# Patient Record
Sex: FEMALE | Race: WHITE | NOT HISPANIC OR LATINO | Employment: UNEMPLOYED | URBAN - NONMETROPOLITAN AREA
[De-identification: names, ages, dates, MRNs, and addresses within clinical notes are randomized per-mention and may not be internally consistent; named-entity substitution may affect disease eponyms.]

---

## 2017-01-27 DIAGNOSIS — I10 ESSENTIAL HYPERTENSION: ICD-10-CM

## 2017-01-30 RX ORDER — LISINOPRIL 2.5 MG/1
TABLET ORAL
Qty: 30 TABLET | Refills: 0 | Status: SHIPPED | OUTPATIENT
Start: 2017-01-30 | End: 2017-02-24 | Stop reason: SDUPTHER

## 2017-02-17 ENCOUNTER — OFFICE VISIT (OUTPATIENT)
Dept: FAMILY MEDICINE CLINIC | Facility: CLINIC | Age: 62
End: 2017-02-17

## 2017-02-17 VITALS
BODY MASS INDEX: 28.28 KG/M2 | WEIGHT: 176 LBS | SYSTOLIC BLOOD PRESSURE: 100 MMHG | HEART RATE: 70 BPM | OXYGEN SATURATION: 97 % | HEIGHT: 66 IN | DIASTOLIC BLOOD PRESSURE: 70 MMHG

## 2017-02-17 DIAGNOSIS — I47.1 SUPRAVENTRICULAR TACHYCARDIA (HCC): ICD-10-CM

## 2017-02-17 DIAGNOSIS — E78.5 DYSLIPIDEMIA: ICD-10-CM

## 2017-02-17 DIAGNOSIS — Z11.59 NEED FOR HEPATITIS C SCREENING TEST: ICD-10-CM

## 2017-02-17 DIAGNOSIS — I10 ESSENTIAL HYPERTENSION: ICD-10-CM

## 2017-02-17 DIAGNOSIS — E55.9 VITAMIN D DEFICIENCY: ICD-10-CM

## 2017-02-17 DIAGNOSIS — Z23 NEED FOR TDAP VACCINATION: ICD-10-CM

## 2017-02-17 DIAGNOSIS — E03.9 ACQUIRED HYPOTHYROIDISM: ICD-10-CM

## 2017-02-17 LAB
25(OH)D3 SERPL-MCNC: 29.4 NG/ML
ALBUMIN SERPL-MCNC: 4.2 G/DL (ref 3.2–4.8)
ALBUMIN/GLOB SERPL: 1.4 G/DL (ref 1.5–2.5)
ALP SERPL-CCNC: 165 U/L (ref 25–100)
ALT SERPL W P-5'-P-CCNC: 27 U/L (ref 7–40)
ANION GAP SERPL CALCULATED.3IONS-SCNC: 4 MMOL/L (ref 3–11)
ARTICHOKE IGE QN: 89 MG/DL (ref 0–130)
AST SERPL-CCNC: 24 U/L (ref 0–33)
BILIRUB SERPL-MCNC: 0.4 MG/DL (ref 0.3–1.2)
BUN BLD-MCNC: 19 MG/DL (ref 9–23)
BUN/CREAT SERPL: 23.8 (ref 7–25)
CALCIUM SPEC-SCNC: 9.5 MG/DL (ref 8.7–10.4)
CHLORIDE SERPL-SCNC: 103 MMOL/L (ref 99–109)
CHOLEST SERPL-MCNC: 169 MG/DL (ref 0–200)
CO2 SERPL-SCNC: 33 MMOL/L (ref 20–31)
CREAT BLD-MCNC: 0.8 MG/DL (ref 0.6–1.3)
DEPRECATED RDW RBC AUTO: 44.5 FL (ref 37–54)
ERYTHROCYTE [DISTWIDTH] IN BLOOD BY AUTOMATED COUNT: 13.1 % (ref 11.3–14.5)
GFR SERPL CREATININE-BSD FRML MDRD: 73 ML/MIN/1.73
GLOBULIN UR ELPH-MCNC: 2.9 GM/DL
GLUCOSE BLD-MCNC: 72 MG/DL (ref 70–100)
HCT VFR BLD AUTO: 46.5 % (ref 34.5–44)
HCV AB SER DONR QL: NORMAL
HDLC SERPL-MCNC: 38 MG/DL (ref 40–60)
HGB BLD-MCNC: 15.9 G/DL (ref 11.5–15.5)
MCH RBC QN AUTO: 32.3 PG (ref 27–31)
MCHC RBC AUTO-ENTMCNC: 34.2 G/DL (ref 32–36)
MCV RBC AUTO: 94.5 FL (ref 80–99)
PLATELET # BLD AUTO: 259 10*3/MM3 (ref 150–450)
PMV BLD AUTO: 9.3 FL (ref 6–12)
POTASSIUM BLD-SCNC: 3.8 MMOL/L (ref 3.5–5.5)
PROT SERPL-MCNC: 7.1 G/DL (ref 5.7–8.2)
RBC # BLD AUTO: 4.92 10*6/MM3 (ref 3.89–5.14)
SODIUM BLD-SCNC: 140 MMOL/L (ref 132–146)
T4 FREE SERPL-MCNC: 1.53 NG/DL (ref 0.89–1.76)
TRIGL SERPL-MCNC: 328 MG/DL (ref 0–150)
TSH SERPL DL<=0.05 MIU/L-ACNC: 1.19 MIU/ML (ref 0.35–5.35)
WBC NRBC COR # BLD: 10.75 10*3/MM3 (ref 3.5–10.8)

## 2017-02-17 PROCEDURE — 84443 ASSAY THYROID STIM HORMONE: CPT | Performed by: NURSE PRACTITIONER

## 2017-02-17 PROCEDURE — 84439 ASSAY OF FREE THYROXINE: CPT | Performed by: NURSE PRACTITIONER

## 2017-02-17 PROCEDURE — 80061 LIPID PANEL: CPT | Performed by: NURSE PRACTITIONER

## 2017-02-17 PROCEDURE — 90471 IMMUNIZATION ADMIN: CPT | Performed by: NURSE PRACTITIONER

## 2017-02-17 PROCEDURE — 82306 VITAMIN D 25 HYDROXY: CPT | Performed by: NURSE PRACTITIONER

## 2017-02-17 PROCEDURE — 85027 COMPLETE CBC AUTOMATED: CPT | Performed by: NURSE PRACTITIONER

## 2017-02-17 PROCEDURE — 80053 COMPREHEN METABOLIC PANEL: CPT | Performed by: NURSE PRACTITIONER

## 2017-02-17 PROCEDURE — 86803 HEPATITIS C AB TEST: CPT | Performed by: NURSE PRACTITIONER

## 2017-02-17 PROCEDURE — 90715 TDAP VACCINE 7 YRS/> IM: CPT | Performed by: NURSE PRACTITIONER

## 2017-02-17 PROCEDURE — 99214 OFFICE O/P EST MOD 30 MIN: CPT | Performed by: NURSE PRACTITIONER

## 2017-02-17 PROCEDURE — 36415 COLL VENOUS BLD VENIPUNCTURE: CPT | Performed by: NURSE PRACTITIONER

## 2017-02-17 PROCEDURE — 84481 FREE ASSAY (FT-3): CPT | Performed by: NURSE PRACTITIONER

## 2017-02-17 RX ORDER — FLECAINIDE ACETATE 150 MG/1
75 TABLET ORAL 2 TIMES DAILY
COMMUNITY
End: 2017-09-06 | Stop reason: SDUPTHER

## 2017-02-17 RX ORDER — CLINDAMYCIN HYDROCHLORIDE 300 MG/1
CAPSULE ORAL
Refills: 0 | COMMUNITY
Start: 2017-02-10 | End: 2017-06-15

## 2017-02-17 NOTE — PROGRESS NOTES
"Akash Ch is a 61 y.o. female.     HPI Comments: Patient is a 61 year old female here today, to establish care with a PCP. She recently moved here from [facility].  She has no acute complaints.  She states she needs management of her hypothyroidism. She states she had hyperthyroidism 10 years ago and had radioactive treatment, and now she is hypothyroid.  She currently takes 137 mcg of Synthroid daily and states it works well for her.  She states she also follows Dr. Cage and Doug, her Cardiologists, for Supraventricular tachycardia. She states she had an ablation in October 2014. She states she follows with them every 6 months and currently has no issues with her SVT.      She states she is up to date on her T-Dap and all other vaccines.  Her mammogram and pap smear are also up to date.         The following portions of the patient's history were reviewed and updated as appropriate: allergies, current medications, past family history, past medical history, past social history, past surgical history and problem list.    Review of Systems   Constitutional: Negative.  Negative for fatigue and unexpected weight change.   HENT: Negative.    Eyes: Negative.    Respiratory: Negative for apnea, cough, chest tightness, shortness of breath and wheezing.    Gastrointestinal: Negative.    Endocrine: Negative.    Genitourinary: Negative.    Musculoskeletal: Negative.    Skin: Negative.    Allergic/Immunologic: Negative.    Neurological: Negative.    Hematological: Negative.    Psychiatric/Behavioral: Negative.      Blood pressure 100/70, pulse 70, height 66\" (167.6 cm), weight 176 lb (79.8 kg), SpO2 97 %.  Objective   Physical Exam   Constitutional: She is oriented to person, place, and time. Vital signs are normal. She appears well-developed and well-nourished. No distress.   HENT:   Head: Normocephalic.   Right Ear: External ear normal.   Left Ear: External ear normal.   Nose: Nose normal. "   Mouth/Throat: Oropharynx is clear and moist. No oropharyngeal exudate.   Eyes: Conjunctivae and lids are normal.   Neck: Normal range of motion. Neck supple. No tracheal deviation present. No thyromegaly present.   Cardiovascular: Normal rate, regular rhythm, S1 normal, S2 normal, normal heart sounds and intact distal pulses.    No murmur heard.  Pulmonary/Chest: Effort normal and breath sounds normal. No respiratory distress. She has no wheezes. She has no rales. She exhibits no tenderness.   Abdominal: Soft. Bowel sounds are normal. She exhibits no distension and no mass. There is no hepatosplenomegaly or splenomegaly. There is no tenderness. There is no rebound and no guarding. No hernia.   Lymphadenopathy:     She has no cervical adenopathy.        Right cervical: No superficial cervical, no deep cervical and no posterior cervical adenopathy present.       Left cervical: No superficial cervical, no deep cervical and no posterior cervical adenopathy present.   Neurological: She is alert and oriented to person, place, and time. Coordination and gait normal.   Skin: Skin is warm, dry and intact. No rash noted.   Psychiatric: She has a normal mood and affect. Her behavior is normal. Judgment and thought content normal.   Nursing note and vitals reviewed.      Assessment/Plan   Justina was seen today for establish care.    Diagnoses and all orders for this visit:    Acquired hypothyroidism  -     TSH  -     T4, Free  -     T3, Free    Supraventricular tachycardia  -     CBC (No Diff)  -     Comprehensive Metabolic Panel    Dyslipidemia  -     CBC (No Diff)  -     Comprehensive Metabolic Panel  -     Lipid Panel    Vitamin D deficiency  -     Vitamin D 25 Hydroxy    Need for hepatitis C screening test  -     Hepatitis C Antibody      New Medications Ordered This Visit   Medications   • clindamycin (CLEOCIN) 300 MG capsule     Sig: TK 1 C PO TID     Refill:  0   • flecainide (TAMBOCOR) 150 MG tablet     Sig: Take 150  mg by mouth 2 (Two) Times a Day. 1/2 tab bid     Routine screening labs drawn today in office to further assess patient's overall health as patient has history of dyslipidemia, Vitamin D deficiency, and SVT.  I will contact patient regarding test results and provide instructions regarding any necessary changes in plan of care.    TSH, T3, and T4 labs drawn today to evaluate her hypothyroidism.  I will contact patient regarding test results and provide instructions regarding any necessary changes in plan of care.  She will continue her current dose of Synthroid for now.      Hepatitis C screening completed as recommended due to patient's age.      Patient was encouraged to keep me informed of any acute changes, lack of improvement, or any new concerning symptoms.  Patient voiced understanding of all instructions and denied further questions.    Patient will return for follow up in 6 months, sooner if needed.  She will also keep her scheduled follow up appointments with cardiology.

## 2017-02-19 LAB — T3FREE SERPL-MCNC: 2 PG/ML (ref 2–4.4)

## 2017-02-22 ENCOUNTER — TELEPHONE (OUTPATIENT)
Dept: FAMILY MEDICINE CLINIC | Facility: CLINIC | Age: 62
End: 2017-02-22

## 2017-02-22 NOTE — TELEPHONE ENCOUNTER
----- Message from Claudette Carpenter MA sent at 2/22/2017 12:43 PM EST -----   Pt notified of results    ----- Message -----     From: JOSE Beard     Sent: 2/20/2017  10:49 AM       To: Claudette Carpenter MA    Let her know that her labs all look good except her cholesterol is a little abnormal. Her good cholesterol should be greater than 40 and hers is 38 and her triglycerides are 328 and should be less than 150. The triglycerides are elevated mostly because she was not fasting, but tell her to cut back on sweets and carbs and this will help as well. We will check her cholesterol next time, with her fasting. Also, she needs to try to get 30 minutes of exercise in, 5 days a week, to help her good cholesterol, as well as triglycerides. Fish oil OTC will help both of these as well.

## 2017-02-24 DIAGNOSIS — I10 ESSENTIAL HYPERTENSION: ICD-10-CM

## 2017-02-27 RX ORDER — LISINOPRIL 2.5 MG/1
TABLET ORAL
Qty: 30 TABLET | Refills: 5 | Status: SHIPPED | OUTPATIENT
Start: 2017-02-27 | End: 2017-07-24 | Stop reason: SDUPTHER

## 2017-06-15 ENCOUNTER — OFFICE VISIT (OUTPATIENT)
Dept: CARDIOLOGY | Facility: CLINIC | Age: 62
End: 2017-06-15

## 2017-06-15 DIAGNOSIS — R07.89 OTHER CHEST PAIN: ICD-10-CM

## 2017-06-15 DIAGNOSIS — I47.1 SUPRAVENTRICULAR TACHYCARDIA (HCC): Primary | ICD-10-CM

## 2017-06-15 DIAGNOSIS — I10 ESSENTIAL HYPERTENSION: ICD-10-CM

## 2017-06-15 DIAGNOSIS — R06.02 SOB (SHORTNESS OF BREATH) ON EXERTION: ICD-10-CM

## 2017-06-15 DIAGNOSIS — E78.5 DYSLIPIDEMIA: ICD-10-CM

## 2017-06-15 PROCEDURE — 93000 ELECTROCARDIOGRAM COMPLETE: CPT | Performed by: NURSE PRACTITIONER

## 2017-06-15 PROCEDURE — 99214 OFFICE O/P EST MOD 30 MIN: CPT | Performed by: NURSE PRACTITIONER

## 2017-06-15 RX ORDER — NITROGLYCERIN 0.4 MG/1
TABLET SUBLINGUAL
Qty: 30 TABLET | Refills: 3 | Status: SHIPPED | OUTPATIENT
Start: 2017-06-15 | End: 2020-09-11 | Stop reason: SDUPTHER

## 2017-06-15 NOTE — PROGRESS NOTES
Subjective   Justina Ch is a 62 y.o. female     Chief Complaint   Patient presents with   • Follow-up     patient appears in office today for follow up        HPI    Problem list:    1. SVT - flecainide therapy  1.2 Event Monitor 5/7-5/20-16 - NSR with PACs and PVCs  2. Bradycardia  3. Left atrial tachycardia and right atrial isthmus-dependent flutter   3.1 RFA 10/14/2014  4. Grave’s disease, status post ablation with hypothyroidism.   5. Dyslipidemia.   6. Hypertension  6.1 Stress test 7/23/14 - low risk, no ischemia   6.2 Echo 7/21/14 - EF 55-60%; DD II; trace MR, TR  7. Cardiomegaly  7.1 CT Chest 8/5/15 - Cardiomegaly; suspected hepatosplenomegaly with fatty liver    Patient is a 62-year-old female who presents today for a follow-up.  She has been having left anterior chest tightness that has come and go for a week now.  It can occur at any time.  She will get short of breath when it happens and can last up to 5 min.  She says she only has occasional fluttering, it is much better.  She denies any dizziness, presyncope, syncope, orthopnea, PND or edema.  She will get short of breath if she walks a lot, but otherwise she is ok.  She just got back from [facility] as her daughter there is Preg and due in Aug.      Current Outpatient Prescriptions   Medication Sig Dispense Refill   • aspirin 81 MG tablet Take 1 tablet by mouth daily.     • cetirizine (ZyrTEC) 10 MG tablet Take 1 tablet by mouth daily as needed.     • flecainide (TAMBOCOR) 150 MG tablet Take 150 mg by mouth 2 (Two) Times a Day. 1/2 tab bid     • levothyroxine (SYNTHROID, LEVOTHROID) 137 MCG tablet Take 1 tablet by mouth daily.     • lisinopril (PRINIVIL,ZESTRIL) 2.5 MG tablet TAKE 1 TABLET BY MOUTH ONCE DAILY 30 tablet 5   • nitroglycerin (NITROSTAT) 0.4 MG SL tablet 1 under the tongue as needed for angina, may repeat q5mins for up three doses 30 tablet 3     No current facility-administered medications for this visit.        ALLERGIES    Review of  patient's allergies indicates no known allergies.    Past Medical History:   Diagnosis Date   • Arrhythmia    • Enlarged heart    • Graves' disease    • Hx of mammogram 09/2016 9/16   • Hyperlipidemia    • Hypertension 7/27/2016       Social History     Social History   • Marital status:      Spouse name: N/A   • Number of children: N/A   • Years of education: N/A     Occupational History   • Not on file.     Social History Main Topics   • Smoking status: Former Smoker     Quit date: 5/4/2001   • Smokeless tobacco: Not on file   • Alcohol use No   • Drug use: No   • Sexual activity: Defer     Other Topics Concern   • Not on file     Social History Narrative       Family History   Problem Relation Age of Onset   • Hypertension Mother    • Breast cancer Mother    • Bone cancer Mother    • Heart attack Father    • Heart disease Father      pacemaker   • Hyperlipidemia Father    • Hypertension Father    • Breast cancer Sister    • Ovarian cancer Sister    • Diabetes Paternal Grandmother        Review of Systems   Constitutional: Negative for diaphoresis and fatigue.   HENT: Positive for sore throat (due to allergies/drainage). Negative for sinus pressure and sneezing.    Eyes: Positive for visual disturbance (wears glasses daily).   Respiratory: Positive for shortness of breath (on exertion; mainly walking a lot and with CP, more than 5 min). Negative for cough, chest tightness and wheezing.    Cardiovascular: Positive for chest pain (left anterior chest tightness that can come at anytime for a few weeks now) and palpitations (occasional flutters). Negative for leg swelling.   Gastrointestinal: Negative for abdominal pain, nausea and vomiting.   Endocrine: Negative for cold intolerance, heat intolerance and polyuria.   Genitourinary: Negative for difficulty urinating, frequency and urgency.   Musculoskeletal: Negative for arthralgias, back pain and neck pain.   Skin: Negative.  Negative for rash and wound.    Allergic/Immunologic: Positive for environmental allergies (seasonal allergies). Negative for food allergies.   Neurological: Negative for dizziness, weakness, light-headedness and headaches.   Hematological: Bruises/bleeds easily (bruises and bleeds easily).   Psychiatric/Behavioral: Negative for agitation, confusion and sleep disturbance. The patient is not nervous/anxious.        Objective   There were no vitals taken for this visit.  Lab Results (most recent)     None        Physical Exam   Constitutional: She is oriented to person, place, and time. Vital signs are normal. She appears well-developed and well-nourished. She is active and cooperative.   HENT:   Head: Normocephalic.   Eyes: Lids are normal.   Wears glasses    Neck: Normal carotid pulses, no hepatojugular reflux and no JVD present. Carotid bruit is not present.   Cardiovascular: Regular rhythm and normal heart sounds.  Bradycardia present.    Pulses:       Radial pulses are 2+ on the right side, and 2+ on the left side.        Dorsalis pedis pulses are 2+ on the right side, and 2+ on the left side.        Posterior tibial pulses are 2+ on the right side, and 2+ on the left side.   No edema BLE.    Pulmonary/Chest: Effort normal and breath sounds normal.   Abdominal: Normal appearance and bowel sounds are normal.   Neurological: She is alert and oriented to person, place, and time.   Skin: Skin is warm, dry and intact.   Psychiatric: She has a normal mood and affect. Her speech is normal and behavior is normal. Judgment and thought content normal. Cognition and memory are normal.       Procedure     ECG 12 Lead  Date/Time: 6/15/2017 1:38 PM  Performed by: ИВАН KEARNS  Authorized by: ИВАН KEARNS   Comparison: compared with previous ECG from 9/9/2016  Similar to previous ECG  Rhythm: sinus bradycardia  Rate: bradycardic  BPM: 58  QRS axis: normal  Clinical impression: non-specific ECG and low voltage  Comments: HTN  SOB                  Assessment/Plan      Diagnosis Plan   1. Supraventricular tachycardia  Stress Test With Myocardial Perfusion One Day    Adult Transthoracic Echo Complete    Stress Test With Myocardial Perfusion One Day    Adult Transthoracic Echo Complete   2. Essential hypertension  ECG 12 Lead    Stress Test With Myocardial Perfusion One Day    Adult Transthoracic Echo Complete    Stress Test With Myocardial Perfusion One Day    Adult Transthoracic Echo Complete   3. SOB (shortness of breath) on exertion  ECG 12 Lead    Stress Test With Myocardial Perfusion One Day    Adult Transthoracic Echo Complete    Stress Test With Myocardial Perfusion One Day    Adult Transthoracic Echo Complete   4. Dyslipidemia  Stress Test With Myocardial Perfusion One Day    Adult Transthoracic Echo Complete    Stress Test With Myocardial Perfusion One Day    Adult Transthoracic Echo Complete   5. Other chest pain  Stress Test With Myocardial Perfusion One Day    Adult Transthoracic Echo Complete    nitroglycerin (NITROSTAT) 0.4 MG SL tablet    Stress Test With Myocardial Perfusion One Day    Adult Transthoracic Echo Complete       Return for After testing.       CP/Shortness of breath - patient will have an ischemia work-up, stress and echo.  She will use Nitro PRN for chest pain, no resolution she will go to the ER.  Hypertension - doing well.  Dyslipidemia - LDL is good, however, trig are elevated.  Encouraged on low carb/sweet diet.  SVT - doing well on flecainide.  Patient will continue her medication regimen.  She will follow-up after testing or sooner if any changes.  QT/QTc 427/424.

## 2017-06-15 NOTE — PATIENT INSTRUCTIONS
Paroxysmal Supraventricular Tachycardia  Paroxysmal supraventricular tachycardia (PSVT) is a type of abnormal heart rhythm. It causes your heart to beat very quickly and then suddenly stop beating so quickly. A normal heart rate is  beats per minute. During an episode of PSVT, your heart rate may be 150-250 beats per minute. This can make you feel light-headed and short of breath. An episode of PSVT can be frightening. It is usually not dangerous.  The heart has four chambers. All chambers need to work together for the heart to beat effectively. A normal heartbeat usually starts in the right upper chamber of the heart (atrium) when an area (sinoatrial node) puts out an electrical signal that spreads to the other chambers. People with PSVT may have abnormal electrical pathways, or they may have other areas in the upper chambers that send out electrical signals. The result is a very rapid heartbeat.  When your heart beats very quickly, it does not have time to fill completely with blood. When PSVT happens often or it lasts for long periods, it can lead to heart weakness and failure. Most people with PSVT do not have any other heart disease.  CAUSES  Abnormal electrical activity in the heart causes PSVT. It is not known why some people get PSVT and others do not.  RISK FACTORS  You may be more likely to have PSVT if:  · You are 20-30 years old.  · You are a woman.  Other factors that may increase your chances of an attack include:  · Stress.  · Being tired.  · Smoking.  · Stimulant drugs.  · Alcoholic drinks.  · Caffeine.  · Pregnancy.  SIGNS AND SYMPTOMS  A mild episode of PSVT may cause no symptoms. If you do have signs and symptoms, they may include:  · A pounding heart.  · Feeling of skipped heartbeats (palpitations).  · Weakness.  · Shortness of breath.  · Tightness or pain in your chest.  · Light-headedness.  · Anxiety.  · Dizziness.  · Sweating.  · Nausea.  · A fainting spell.  DIAGNOSIS  Your health care  provider may suspect PSVT if you have symptoms that come and go. The health care provider will do a physical exam. If you are having an episode during the exam, the health care provider may be able to diagnose PSVT by listening to your heart and feeling your pulse. Tests may also be done, including:  · An electrical study of your heart (electrocardiogram, or ECG).  · A test in which you wear a portable ECG monitor all day (Holter monitor) or for several days (event monitor).  · A test that involves taking an image of your heart using sound waves (echocardiogram) to rule out other causes of a fast heart rate.  TREATMENT  You may not need treatment if episodes of PSVT do not happen often or if they do not cause symptoms. If PSVT episodes do cause symptoms, your health care provider may first suggest trying a self-treatment called vagus nerve stimulation. The vagus nerve extends down from the brain. It regulates certain body functions. Stimulating this nerve can slow down the heart. Your health care provider can teach you ways to do this. You may need to try a few ways to find what works best for you. Options include:  · Holding your breath and pushing, as though you are having a bowel movement.  · Massaging an area on one side of your neck below your jaw.  · Bending forward with your head between your legs.  · Bending forward with your head between your legs and coughing.  · Massaging your eyeballs with your eyes closed.  If vagus nerve stimulation does not work, other treatment options include:  · Medicines to prevent an attack.  · Being treated in the hospital with medicine or electric shock to stop an attack (cardioversion). This treatment can include:    Getting medicine through an IV line.    Having a small electric shock delivered to your heart. You will be given medicine to make you sleep through this procedure.  · If you have frequent episodes with symptoms, you may need a procedure to get rid of the faulty  areas of your heart (radiofrequency ablation) and end the episodes of PSVT. In this procedure:    A long, thin tube (catheter) is passed through one of your veins into your heart.    Energy directed through the catheter eliminates the areas of your heart that are causing abnormal electric stimulation.  HOME CARE INSTRUCTIONS  · Take medicines only as directed by your health care provider.  · Do not use caffeine in any form if caffeine triggers episodes of PSVT. Otherwise, consume caffeine in moderation. This means no more than a few cups of coffee or the equivalent each day.  · Do not drink alcohol if alcohol triggers episodes of PSVT. Otherwise, limit alcohol intake to no more than 1 drink per day for nonpregnant women and 2 drinks per day for men. One drink equals 12 ounces of beer, 5 ounces of wine, or 1½ ounces of hard liquor.  · Do not use any tobacco products, including cigarettes, chewing tobacco, or electronic cigarettes. If you need help quitting, ask your health care provider.  · Try to get at least 7 hours of sleep each night.  · Find healthy ways to manage stress.  · Perform vagus nerve stimulation as directed by your health care provider.  · Maintain a healthy weight.  · Get some exercise on most days. Ask your health care provider to suggest some good activities for you.  SEEK MEDICAL CARE IF:  · You are having episodes of PSVT more often, or they are lasting longer.  · Vagus nerve stimulation is no longer helping.  · You have new symptoms during an episode.  SEEK IMMEDIATE MEDICAL CARE IF:  · You have chest pain or trouble breathing.  · You have an episode of PSVT that has lasted longer than 20 minutes.  · You have passed out from an episode of PSVT.  These symptoms may represent a serious problem that is an emergency. Do not wait to see if the symptoms will go away. Get medical help right away. Call your local emergency services (911 in the [place] Do not drive yourself to the hospital.     This  information is not intended to replace advice given to you by your health care provider. Make sure you discuss any questions you have with your health care provider.     Document Released: 12/18/2006 Document Revised: 01/08/2016 Document Reviewed: 05/28/2015  [employer] Patient Education ©2017 [employer]    Nonspecific Chest Pain   Chest pain can be caused by many different conditions. There is always a chance that your pain could be related to something serious, such as a heart attack or a blood clot in your lungs. Chest pain can also be caused by conditions that are not life-threatening. If you have chest pain, it is very important to follow up with your health care provider.  CAUSES   Chest pain can be caused by:  · Heartburn.  · Pneumonia or bronchitis.  · Anxiety or stress.  · Inflammation around your heart (pericarditis) or lung (pleuritis or pleurisy).  · A blood clot in your lung.  · A collapsed lung (pneumothorax). It can develop suddenly on its own (spontaneous pneumothorax) or from trauma to the chest.  · Shingles infection (varicella-zoster virus).  · Heart attack.  · Damage to the bones, muscles, and cartilage that make up your chest wall. This can include:    Bruised bones due to injury.    Strained muscles or cartilage due to frequent or repeated coughing or overwork.    Fracture to one or more ribs.    Sore cartilage due to inflammation (costochondritis).  RISK FACTORS   Risk factors for chest pain may include:  · Activities that increase your risk for trauma or injury to your chest.  · Respiratory infections or conditions that cause frequent coughing.  · Medical conditions or overeating that can cause heartburn.  · Heart disease or family history of heart disease.  · Conditions or health behaviors that increase your risk of developing a blood clot.  · Having had chicken pox (varicella zoster).  SIGNS AND SYMPTOMS  Chest pain can feel like:  · Burning or tingling on the surface of your  chest or deep in your chest.  · Crushing, pressure, aching, or squeezing pain.  · Dull or sharp pain that is worse when you move, cough, or take a deep breath.  · Pain that is also felt in your back, neck, shoulder, or arm, or pain that spreads to any of these areas.  Your chest pain may come and go, or it may stay constant.  DIAGNOSIS  Lab tests or other studies may be needed to find the cause of your pain. Your health care provider may have you take a test called an ambulatory ECG (electrocardiogram). An ECG records your heartbeat patterns at the time the test is performed. You may also have other tests, such as:  · Transthoracic echocardiogram (TTE). During echocardiography, sound waves are used to create a picture of all of the heart structures and to look at how blood flows through your heart.  · Transesophageal echocardiogram (CRISTIANE). This is a more advanced imaging test that obtains images from inside your body. It allows your health care provider to see your heart in finer detail.  · Cardiac monitoring. This allows your health care provider to monitor your heart rate and rhythm in real time.  · Holter monitor. This is a portable device that records your heartbeat and can help to diagnose abnormal heartbeats. It allows your health care provider to track your heart activity for several days, if needed.  · Stress tests. These can be done through exercise or by taking medicine that makes your heart beat more quickly.  · Blood tests.  · Imaging tests.  TREATMENT   Your treatment depends on what is causing your chest pain. Treatment may include:  · Medicines. These may include:    Acid blockers for heartburn.    Anti-inflammatory medicine.    Pain medicine for inflammatory conditions.    Antibiotic medicine, if an infection is present.    Medicines to dissolve blood clots.    Medicines to treat coronary artery disease.  · Supportive care for conditions that do not require medicines. This may include:    Resting.     Applying heat or cold packs to injured areas.    Limiting activities until pain decreases.  HOME CARE INSTRUCTIONS  · If you were prescribed an antibiotic medicine, finish it all even if you start to feel better.  · Avoid any activities that bring on chest pain.  · Do not use any tobacco products, including cigarettes, chewing tobacco, or electronic cigarettes. If you need help quitting, ask your health care provider.  · Do not drink alcohol.  · Take medicines only as directed by your health care provider.  · Keep all follow-up visits as directed by your health care provider. This is important. This includes any further testing if your chest pain does not go away.  · If heartburn is the cause for your chest pain, you may be told to keep your head raised (elevated) while sleeping. This reduces the chance that acid will go from your stomach into your esophagus.  · Make lifestyle changes as directed by your health care provider. These may include:    Getting regular exercise. Ask your health care provider to suggest some activities that are safe for you.    Eating a heart-healthy diet. A registered dietitian can help you to learn healthy eating options.    Maintaining a healthy weight.    Managing diabetes, if necessary.    Reducing stress.  SEEK MEDICAL CARE IF:  · Your chest pain does not go away after treatment.  · You have a rash with blisters on your chest.  · You have a fever.  SEEK IMMEDIATE MEDICAL CARE IF:   · Your chest pain is worse.  · You have an increasing cough, or you cough up blood.  · You have severe abdominal pain.  · You have severe weakness.  · You faint.  · You have chills.  · You have sudden, unexplained chest discomfort.  · You have sudden, unexplained discomfort in your arms, back, neck, or jaw.  · You have shortness of breath at any time.  · You suddenly start to sweat, or your skin gets clammy.  · You feel nauseous or you vomit.  · You suddenly feel light-headed or dizzy.  · Your heart  begins to beat quickly, or it feels like it is skipping beats.  These symptoms may represent a serious problem that is an emergency. Do not wait to see if the symptoms will go away. Get medical help right away. Call your local emergency services (911 in the [place] Do not drive yourself to the hospital.     This information is not intended to replace advice given to you by your health care provider. Make sure you discuss any questions you have with your health care provider.     Document Released: 09/27/2006 Document Revised: 01/08/2016 Document Reviewed: 07/24/2015  [employer] Interactive Patient Education ©2017 [employer]

## 2017-06-23 ENCOUNTER — OFFICE VISIT (OUTPATIENT)
Dept: CARDIOLOGY | Facility: CLINIC | Age: 62
End: 2017-06-23

## 2017-06-23 VITALS
HEART RATE: 60 BPM | HEIGHT: 66 IN | BODY MASS INDEX: 28.12 KG/M2 | SYSTOLIC BLOOD PRESSURE: 108 MMHG | DIASTOLIC BLOOD PRESSURE: 68 MMHG | WEIGHT: 175 LBS

## 2017-06-23 DIAGNOSIS — I49.1 PAC (PREMATURE ATRIAL CONTRACTION): ICD-10-CM

## 2017-06-23 DIAGNOSIS — I47.1 ATRIAL TACHYCARDIA (HCC): Primary | ICD-10-CM

## 2017-06-23 DIAGNOSIS — R06.02 SHORTNESS OF BREATH: ICD-10-CM

## 2017-06-23 PROCEDURE — 99213 OFFICE O/P EST LOW 20 MIN: CPT | Performed by: PHYSICIAN ASSISTANT

## 2017-06-23 PROCEDURE — 93000 ELECTROCARDIOGRAM COMPLETE: CPT | Performed by: PHYSICIAN ASSISTANT

## 2017-06-23 NOTE — PROGRESS NOTES
Justina Jing  1955  [phone]      06/23/2017    [facility] CARDIOLOGY     Emma Mccann, APRN  [address]  [place] [address]    Chief Complaint   Patient presents with   • Palpitations       Problem List:   1. Supraventricular tachycardia:   a. History of tachypalpitations and emergency room visits dating back to at least 2-3 years ago.   b. Emergency room visit in July with subsequent hospitalization for supraventricular tachycardia at [facility] for initiation of flecainide.   c. Event recorder, July 2014, demonstrating runs of narrow QRS tachycardia, as fast as 190-200 beats per minute.   d. Echocardiogram, 07/21/2014: Normal left ventricular size and function with ejection fraction of 55%. No significant valvular insufficiency.   e. [facility], database normal, per patient, database incomplete.  f. Status post EP study and RFA of left atrial tachycardia and right atrial isthmus-dependent flutter on 10/04/2014.  g. Recurrent tachypalpitations with event monitor, 01/23/2015 thru 02/21/2015, showing sinus rhythm with occasional PAC and sinus arrhythmia and no SVT or atrial fibrillation.   h. Initiation of Tambocor therapy for suppression of PACs, 02/27/2015.   2. Grave’s disease, status post ablation with hypothyroidism.   3. Dyslipidemia.   4. Hypertension.   5. Skin cancer.   6. Surgical history:  a. D and C.   b. Skin excision for skin cancer.    Allergies  No Known Allergies    Current Medications    Current Outpatient Prescriptions:   •  aspirin 81 MG tablet, Take 1 tablet by mouth daily., Disp: , Rfl:   •  cetirizine (ZyrTEC) 10 MG tablet, Take 1 tablet by mouth daily as needed., Disp: , Rfl:   •  flecainide (TAMBOCOR) 150 MG tablet, Take 75 mg by mouth 2 (Two) Times a Day., Disp: , Rfl:   •  levothyroxine (SYNTHROID, LEVOTHROID) 137 MCG tablet, Take 1 tablet by mouth daily., Disp: , Rfl:   •  lisinopril (PRINIVIL,ZESTRIL)  "2.5 MG tablet, TAKE 1 TABLET BY MOUTH ONCE DAILY, Disp: 30 tablet, Rfl: 5  •  nitroglycerin (NITROSTAT) 0.4 MG SL tablet, 1 under the tongue as needed for angina, may repeat q5mins for up three doses, Disp: 30 tablet, Rfl: 3    History of Present Illness   HPI    Pt presents for follow up of PACs. Since we last saw the pt, pt denies any PAC episodes on her current dose of flecainide,, LH, and dizziness. Denies any hospitalizations, ER visits, bleeding, or TIA/CVA symptoms. Overall feels well except for some KIRKPATRICK and tightness in her chest. Therefore, she is having a stress test and echo with Dr. Camacho next month. BP has been well controlled.     ROS:  General:  Denies fatigue, weight gain or loss  Cardiovascular:  Denies CP, PND, syncope, near syncope, edema or palpitations.  Pulmonary:  +KIRKPATRICK,-  cough, or wheezing      Vitals:    06/23/17 1332   BP: 108/68   BP Location: Right arm   Patient Position: Sitting   Pulse: 60   Weight: 175 lb (79.4 kg)   Height: 66\" (167.6 cm)     PE:  General: NAD  Neck: no JVD, no carotid bruits, no TM  Heart RRR, NL S1, S2, S4 present, no rubs, murmurs  Lungs: CTA, no wheezes, rhonchi, or rales  Abd: soft, non-tender, NL BS  Ext: No musculoskeletal deformities, no edema, cyanosis, or clubbing  Psych: normal mood and affect    Diagnostic Data:    ECG 12 Lead  Date/Time: 6/23/2017 2:13 PM  Performed by: SANJANA WILLSON  Authorized by: SANJANA WILLSON   Comparison: compared with previous ECG   Similar to previous ECG  Rhythm: sinus rhythm  BPM: 60              1. Left atrial tachycardia and right atrial isthmus-dependent flutter     2. PAC (premature atrial contraction)    3. Shortness of breath          Plan:  1) PACs- well controlled on Flecainide  Continue present medications.   2) SOB- will have stress test and echo next month with Dr. Camacho. Results to be faxed to us.   3) HTN- controlled  Wt loss, exercise, salt reduction    F/up in 6-8 months    Sanjana Roberson " [facility]  6/23/2017   2:14 PM  I saw this patient independently.

## 2017-07-12 ENCOUNTER — HOSPITAL ENCOUNTER (OUTPATIENT)
Dept: CARDIOLOGY | Facility: HOSPITAL | Age: 62
Discharge: HOME | End: 2017-07-12

## 2017-07-12 ENCOUNTER — OUTSIDE FACILITY SERVICE (OUTPATIENT)
Dept: CARDIOLOGY | Facility: CLINIC | Age: 62
End: 2017-07-12

## 2017-07-12 LAB
MAXIMAL PREDICTED HEART RATE: 160 BPM
STRESS TARGET HR: 136 BPM

## 2017-07-12 PROCEDURE — 93306 TTE W/DOPPLER COMPLETE: CPT

## 2017-07-12 PROCEDURE — 78452 HT MUSCLE IMAGE SPECT MULT: CPT

## 2017-07-12 PROCEDURE — 93306 TTE W/DOPPLER COMPLETE: CPT | Performed by: INTERNAL MEDICINE

## 2017-07-12 PROCEDURE — 78452 HT MUSCLE IMAGE SPECT MULT: CPT | Performed by: INTERNAL MEDICINE

## 2017-07-12 PROCEDURE — 93018 CV STRESS TEST I&R ONLY: CPT | Performed by: INTERNAL MEDICINE

## 2017-07-12 PROCEDURE — 25010000002 REGADENOSON 0.4 MG/5ML SOLUTION: Performed by: INTERNAL MEDICINE

## 2017-07-12 PROCEDURE — 0 TECHNETIUM SESTAMIBI: Performed by: INTERNAL MEDICINE

## 2017-07-12 PROCEDURE — 93017 CV STRESS TEST TRACING ONLY: CPT

## 2017-07-12 PROCEDURE — A9500 TC99M SESTAMIBI: HCPCS | Performed by: INTERNAL MEDICINE

## 2017-07-12 RX ADMIN — Medication 1 DOSE: at 16:15

## 2017-07-12 RX ADMIN — REGADENOSON 0.4 MG: 0.08 INJECTION, SOLUTION INTRAVENOUS at 16:15

## 2017-07-13 ENCOUNTER — DOCUMENTATION (OUTPATIENT)
Dept: CARDIOLOGY | Facility: CLINIC | Age: 62
End: 2017-07-13

## 2017-07-17 ENCOUNTER — OFFICE VISIT (OUTPATIENT)
Dept: FAMILY MEDICINE CLINIC | Facility: CLINIC | Age: 62
End: 2017-07-17

## 2017-07-17 VITALS
SYSTOLIC BLOOD PRESSURE: 100 MMHG | OXYGEN SATURATION: 96 % | DIASTOLIC BLOOD PRESSURE: 64 MMHG | WEIGHT: 179 LBS | HEIGHT: 66 IN | BODY MASS INDEX: 28.77 KG/M2 | HEART RATE: 68 BPM

## 2017-07-17 DIAGNOSIS — J30.2 SEASONAL ALLERGIC RHINITIS, UNSPECIFIED ALLERGIC RHINITIS TRIGGER: ICD-10-CM

## 2017-07-17 DIAGNOSIS — R09.82 PND (POST-NASAL DRIP): ICD-10-CM

## 2017-07-17 DIAGNOSIS — J02.9 ACUTE PHARYNGITIS, UNSPECIFIED ETIOLOGY: ICD-10-CM

## 2017-07-17 DIAGNOSIS — H65.03 BILATERAL ACUTE SEROUS OTITIS MEDIA, RECURRENCE NOT SPECIFIED: ICD-10-CM

## 2017-07-17 LAB
EXPIRATION DATE: NORMAL
EXPIRATION DATE: NORMAL
HETEROPH AB SER QL LA: NEGATIVE
INTERNAL CONTROL: NORMAL
INTERNAL CONTROL: NORMAL
Lab: NORMAL
Lab: NORMAL
S PYO AG THROAT QL: NEGATIVE

## 2017-07-17 PROCEDURE — 86308 HETEROPHILE ANTIBODY SCREEN: CPT | Performed by: NURSE PRACTITIONER

## 2017-07-17 PROCEDURE — 99213 OFFICE O/P EST LOW 20 MIN: CPT | Performed by: NURSE PRACTITIONER

## 2017-07-17 PROCEDURE — 87880 STREP A ASSAY W/OPTIC: CPT | Performed by: NURSE PRACTITIONER

## 2017-07-17 RX ORDER — PREDNISONE 10 MG/1
10 TABLET ORAL 2 TIMES DAILY
Qty: 10 TABLET | Refills: 0 | Status: SHIPPED | OUTPATIENT
Start: 2017-07-17 | End: 2017-07-22

## 2017-07-17 RX ORDER — CETIRIZINE HYDROCHLORIDE 10 MG/1
10 TABLET ORAL DAILY
Qty: 30 TABLET | Refills: 5 | Status: SHIPPED | OUTPATIENT
Start: 2017-07-17 | End: 2018-01-30 | Stop reason: SDUPTHER

## 2017-07-17 NOTE — PROGRESS NOTES
Akash Ch is a 62 y.o. female.     Sore Throat    This is a new problem. The current episode started 1 to 4 weeks ago (2 weeks). The problem has been gradually worsening. The pain is worse on the right side. There has been no fever. The pain is at a severity of 8/10. The pain is moderate. Associated symptoms include headaches, a hoarse voice, swollen glands and trouble swallowing. Pertinent negatives include no abdominal pain, congestion, coughing, diarrhea, ear discharge, ear pain, neck pain, shortness of breath or vomiting. She has had no exposure to strep or mono. She has tried nothing for the symptoms.       The following portions of the patient's history were reviewed and updated as appropriate: allergies, current medications, past family history, past medical history, past social history, past surgical history and problem list.    Review of Systems   Constitutional: Positive for fatigue. Negative for activity change, appetite change, chills and fever.   HENT: Positive for hoarse voice, postnasal drip, sore throat, trouble swallowing and voice change. Negative for congestion, ear discharge, ear pain, rhinorrhea, sinus pressure and sneezing.    Eyes: Negative.    Respiratory: Negative for cough, chest tightness, shortness of breath and wheezing.    Cardiovascular: Negative.    Gastrointestinal: Negative for abdominal pain, diarrhea, nausea and vomiting.   Musculoskeletal: Negative for arthralgias, myalgias and neck pain.   Skin: Negative for color change and rash.   Allergic/Immunologic: Positive for environmental allergies. Negative for food allergies and immunocompromised state.   Neurological: Positive for headaches. Negative for dizziness.   Hematological: Positive for adenopathy. Does not bruise/bleed easily.       Objective   Physical Exam   Constitutional: She is oriented to person, place, and time. She appears well-developed and well-nourished. No distress.   HENT:   Head: Normocephalic.    Right Ear: External ear and ear canal normal. A middle ear effusion is present.   Left Ear: External ear and ear canal normal. A middle ear effusion is present.   Nose: Nose normal.   Mouth/Throat: Oropharyngeal exudate (PND) present.   Eyes: Conjunctivae are normal.   Neck: Normal range of motion. Neck supple. No tracheal deviation present. No thyromegaly present.   Cardiovascular: Normal rate, regular rhythm, normal heart sounds and intact distal pulses.    No murmur heard.  Pulmonary/Chest: Effort normal and breath sounds normal. No respiratory distress. She has no wheezes. She has no rales. She exhibits no tenderness.   Abdominal: Soft. Bowel sounds are normal. She exhibits no distension and no mass. There is no hepatosplenomegaly or splenomegaly. There is no tenderness. There is no rebound and no guarding. No hernia.   Musculoskeletal: Normal range of motion. She exhibits no edema or tenderness.   Lymphadenopathy:     She has cervical adenopathy.        Right cervical: Superficial cervical and deep cervical adenopathy present. No posterior cervical adenopathy present.       Left cervical: Superficial cervical and deep cervical adenopathy present. No posterior cervical adenopathy present.   Neurological: She is alert and oriented to person, place, and time. Coordination and gait normal.   Skin: Skin is warm and dry. No rash noted.   Psychiatric: She has a normal mood and affect. Her behavior is normal. Judgment and thought content normal.       Assessment/Plan   Justina was seen today for sore throat.    Diagnoses and all orders for this visit:    PND (post-nasal drip)  -     cetirizine (zyrTEC) 10 MG tablet; Take 1 tablet by mouth Daily for 30 days.    Acute pharyngitis, unspecified etiology    Seasonal allergic rhinitis, unspecified allergic rhinitis trigger  -     cetirizine (zyrTEC) 10 MG tablet; Take 1 tablet by mouth Daily for 30 days.    Bilateral acute serous otitis media, recurrence not specified  -      predniSONE (DELTASONE) 10 MG tablet; Take 1 tablet by mouth 2 (Two) Times a Day for 5 days.       Strept screen negative in the clinic today. Mono screen negative in clinic also.     Zyrtec refilled today and patient advised to take daily as directed, as she has not been. Patient refused Flonase, but a prescription of po Prednisone given for her serous otitis, for patient to take if symptoms are not relieved by Zyrtec.     Patient was encouraged to keep me informed of any acute changes, lack of improvement, or any new concerning symptoms. Patient voiced understanding of all instructions and denied further questions.    Patient to RTC prn and for her regular follow up.

## 2017-07-23 DIAGNOSIS — I10 ESSENTIAL HYPERTENSION: ICD-10-CM

## 2017-07-23 RX ORDER — LISINOPRIL 2.5 MG/1
TABLET ORAL
Qty: 30 TABLET | Refills: 0 | Status: CANCELLED | OUTPATIENT
Start: 2017-07-23

## 2017-07-24 ENCOUNTER — DOCUMENTATION (OUTPATIENT)
Dept: CARDIOLOGY | Facility: CLINIC | Age: 62
End: 2017-07-24

## 2017-07-24 DIAGNOSIS — I10 ESSENTIAL HYPERTENSION: ICD-10-CM

## 2017-07-24 RX ORDER — LISINOPRIL 2.5 MG/1
2.5 TABLET ORAL DAILY
Qty: 30 TABLET | Refills: 5 | Status: SHIPPED | OUTPATIENT
Start: 2017-07-24 | End: 2017-12-18 | Stop reason: SDUPTHER

## 2017-09-06 DIAGNOSIS — I47.1 SVT (SUPRAVENTRICULAR TACHYCARDIA) (HCC): Primary | ICD-10-CM

## 2017-09-06 RX ORDER — FLECAINIDE ACETATE 150 MG/1
TABLET ORAL
Qty: 30 TABLET | Refills: 5 | Status: SHIPPED | OUTPATIENT
Start: 2017-09-06 | End: 2018-03-14 | Stop reason: SDUPTHER

## 2017-09-12 ENCOUNTER — OFFICE VISIT (OUTPATIENT)
Dept: CARDIOLOGY | Facility: CLINIC | Age: 62
End: 2017-09-12

## 2017-09-12 VITALS
DIASTOLIC BLOOD PRESSURE: 71 MMHG | HEIGHT: 66 IN | WEIGHT: 184 LBS | BODY MASS INDEX: 29.57 KG/M2 | SYSTOLIC BLOOD PRESSURE: 118 MMHG | HEART RATE: 65 BPM | OXYGEN SATURATION: 94 %

## 2017-09-12 DIAGNOSIS — I10 ESSENTIAL HYPERTENSION: ICD-10-CM

## 2017-09-12 DIAGNOSIS — I47.1 SUPRAVENTRICULAR TACHYCARDIA (HCC): Primary | ICD-10-CM

## 2017-09-12 DIAGNOSIS — E78.5 DYSLIPIDEMIA: ICD-10-CM

## 2017-09-12 PROCEDURE — 99213 OFFICE O/P EST LOW 20 MIN: CPT | Performed by: NURSE PRACTITIONER

## 2017-09-12 NOTE — PATIENT INSTRUCTIONS
Paroxysmal Supraventricular Tachycardia  Paroxysmal supraventricular tachycardia (PSVT) is a type of abnormal heart rhythm. It causes your heart to beat very quickly and then suddenly stop beating so quickly. A normal heart rate is  beats per minute. During an episode of PSVT, your heart rate may be 150-250 beats per minute. This can make you feel light-headed and short of breath. An episode of PSVT can be frightening. It is usually not dangerous.  The heart has four chambers. All chambers need to work together for the heart to beat effectively. A normal heartbeat usually starts in the right upper chamber of the heart (atrium) when an area (sinoatrial node) puts out an electrical signal that spreads to the other chambers. People with PSVT may have abnormal electrical pathways, or they may have other areas in the upper chambers that send out electrical signals. The result is a very rapid heartbeat.  When your heart beats very quickly, it does not have time to fill completely with blood. When PSVT happens often or it lasts for long periods, it can lead to heart weakness and failure. Most people with PSVT do not have any other heart disease.  CAUSES  Abnormal electrical activity in the heart causes PSVT. It is not known why some people get PSVT and others do not.  RISK FACTORS  You may be more likely to have PSVT if:  · You are 20-30 years old.  · You are a woman.  Other factors that may increase your chances of an attack include:  · Stress.  · Being tired.  · Smoking.  · Stimulant drugs.  · Alcoholic drinks.  · Caffeine.  · Pregnancy.  SIGNS AND SYMPTOMS  A mild episode of PSVT may cause no symptoms. If you do have signs and symptoms, they may include:  · A pounding heart.  · Feeling of skipped heartbeats (palpitations).  · Weakness.  · Shortness of breath.  · Tightness or pain in your chest.  · Light-headedness.  · Anxiety.  · Dizziness.  · Sweating.  · Nausea.  · A fainting spell.  DIAGNOSIS  Your health care  provider may suspect PSVT if you have symptoms that come and go. The health care provider will do a physical exam. If you are having an episode during the exam, the health care provider may be able to diagnose PSVT by listening to your heart and feeling your pulse. Tests may also be done, including:  · An electrical study of your heart (electrocardiogram, or ECG).  · A test in which you wear a portable ECG monitor all day (Holter monitor) or for several days (event monitor).  · A test that involves taking an image of your heart using sound waves (echocardiogram) to rule out other causes of a fast heart rate.  TREATMENT  You may not need treatment if episodes of PSVT do not happen often or if they do not cause symptoms. If PSVT episodes do cause symptoms, your health care provider may first suggest trying a self-treatment called vagus nerve stimulation. The vagus nerve extends down from the brain. It regulates certain body functions. Stimulating this nerve can slow down the heart. Your health care provider can teach you ways to do this. You may need to try a few ways to find what works best for you. Options include:  · Holding your breath and pushing, as though you are having a bowel movement.  · Massaging an area on one side of your neck below your jaw.  · Bending forward with your head between your legs.  · Bending forward with your head between your legs and coughing.  · Massaging your eyeballs with your eyes closed.  If vagus nerve stimulation does not work, other treatment options include:  · Medicines to prevent an attack.  · Being treated in the hospital with medicine or electric shock to stop an attack (cardioversion). This treatment can include:    Getting medicine through an IV line.    Having a small electric shock delivered to your heart. You will be given medicine to make you sleep through this procedure.  · If you have frequent episodes with symptoms, you may need a procedure to get rid of the faulty  areas of your heart (radiofrequency ablation) and end the episodes of PSVT. In this procedure:    A long, thin tube (catheter) is passed through one of your veins into your heart.    Energy directed through the catheter eliminates the areas of your heart that are causing abnormal electric stimulation.  HOME CARE INSTRUCTIONS  · Take medicines only as directed by your health care provider.  · Do not use caffeine in any form if caffeine triggers episodes of PSVT. Otherwise, consume caffeine in moderation. This means no more than a few cups of coffee or the equivalent each day.  · Do not drink alcohol if alcohol triggers episodes of PSVT. Otherwise, limit alcohol intake to no more than 1 drink per day for nonpregnant women and 2 drinks per day for men. One drink equals 12 ounces of beer, 5 ounces of wine, or 1½ ounces of hard liquor.  · Do not use any tobacco products, including cigarettes, chewing tobacco, or electronic cigarettes. If you need help quitting, ask your health care provider.  · Try to get at least 7 hours of sleep each night.  · Find healthy ways to manage stress.  · Perform vagus nerve stimulation as directed by your health care provider.  · Maintain a healthy weight.  · Get some exercise on most days. Ask your health care provider to suggest some good activities for you.  SEEK MEDICAL CARE IF:  · You are having episodes of PSVT more often, or they are lasting longer.  · Vagus nerve stimulation is no longer helping.  · You have new symptoms during an episode.  SEEK IMMEDIATE MEDICAL CARE IF:  · You have chest pain or trouble breathing.  · You have an episode of PSVT that has lasted longer than 20 minutes.  · You have passed out from an episode of PSVT.  These symptoms may represent a serious problem that is an emergency. Do not wait to see if the symptoms will go away. Get medical help right away. Call your local emergency services (911 in the [place] Do not drive yourself to the hospital.     This  information is not intended to replace advice given to you by your health care provider. Make sure you discuss any questions you have with your health care provider.     Document Released: 12/18/2006 Document Revised: 01/08/2016 Document Reviewed: 05/28/2015  [employer] Interactive Patient Education ©2017 [employer]

## 2017-09-12 NOTE — PROGRESS NOTES
Subjective   Justina Ch is a 62 y.o. female     Chief Complaint   Patient presents with   • Hypertension     presents for a follow up       HPI    Problem list:    1. SVT - flecainide therapy  1.2 Event Monitor 5/7-5/20-16 - NSR with PACs and PVCs  2. Bradycardia  3. Left atrial tachycardia and right atrial isthmus-dependent flutter   3.1 RFA 10/14/2014  4. Grave’s disease, status post ablation with hypothyroidism.   5. Dyslipidemia.   6. Hypertension  6.1 Stress test 7/23/14 - low risk, no ischemia   6.2 Stress Test 7/12/17-no ischemia, low risk  6.3 Echo 7/21/14 - EF 55-60%; DD II; trace MR, TR  6.4 Echo 7/12/17-EF greater than 65%, diastolic dysfunction 1, trace TR, trace SC, PA 20-25  7. Cardiomegaly  7.1 CT Chest 8/5/15 - Cardiomegaly; suspected hepatosplenomegaly with fatty liver    Patient is a 62-year-old female who presents today for follow-up on testing.  She denies any chest pain, pressure, dizziness, presyncope, syncope, orthopnea, PND or edema.  She says that her palpitations are much much much better.  She only gets short of breath when she exerts herself more like taking the stairs.  Her daughter just had a little boy and she has been in [place] visiting them.    We went over stress and echo.    Current Outpatient Prescriptions   Medication Sig Dispense Refill   • aspirin 81 MG tablet Take 1 tablet by mouth daily.     • flecainide (TAMBOCOR) 150 MG tablet TAKE 1/2 TABLET BY MOUTH TWICE DAILY 30 tablet 5   • levothyroxine (SYNTHROID, LEVOTHROID) 137 MCG tablet Take 1 tablet by mouth daily.     • lisinopril (PRINIVIL,ZESTRIL) 2.5 MG tablet Take 1 tablet by mouth Daily. 30 tablet 5   • nitroglycerin (NITROSTAT) 0.4 MG SL tablet 1 under the tongue as needed for angina, may repeat q5mins for up three doses 30 tablet 3     No current facility-administered medications for this visit.        ALLERGIES    Review of patient's allergies indicates no known allergies.    Past Medical History:   Diagnosis Date  "  • Arrhythmia    • Enlarged heart    • Graves' disease    • Hx of mammogram 09/2016 9/16   • Hyperlipidemia    • Hypertension 7/27/2016       Social History     Social History   • Marital status:      Spouse name: N/A   • Number of children: N/A   • Years of education: N/A     Occupational History   • Not on file.     Social History Main Topics   • Smoking status: Former Smoker     Quit date: 5/4/2001   • Smokeless tobacco: Never Used   • Alcohol use No   • Drug use: No   • Sexual activity: Defer     Other Topics Concern   • Not on file     Social History Narrative       Family History   Problem Relation Age of Onset   • Hypertension Mother    • Breast cancer Mother    • Bone cancer Mother    • Heart attack Father    • Heart disease Father      pacemaker   • Hyperlipidemia Father    • Hypertension Father    • Breast cancer Sister    • Ovarian cancer Sister    • Diabetes Paternal Grandmother        Review of Systems   Constitutional: Negative for diaphoresis and fatigue.   HENT: Negative for rhinorrhea and sneezing.    Eyes: Positive for visual disturbance (wears glasses ).   Respiratory: Positive for shortness of breath (if exert self more like with inclines ). Negative for chest tightness.    Cardiovascular: Positive for palpitations (much much much better ). Negative for chest pain and leg swelling.   Gastrointestinal: Negative for nausea and vomiting.   Endocrine: Negative.    Genitourinary: Negative for difficulty urinating.   Musculoskeletal: Positive for arthralgias (patient states that if she is up moving around or working that her whole upper body hurts and aches ). Negative for back pain and neck pain.   Skin: Negative.    Allergic/Immunologic: Negative.    Neurological: Negative for dizziness, syncope and light-headedness.   Hematological: Negative.    Psychiatric/Behavioral: Negative.        Objective   /71 (BP Location: Left arm, Patient Position: Sitting)  Pulse 65  Ht 66\" (167.6 cm) "  Wt 184 lb (83.5 kg)  SpO2 94%  BMI 29.7 kg/m2  Lab Results (most recent)     None        Physical Exam   Constitutional: She is oriented to person, place, and time. Vital signs are normal. She appears well-developed and well-nourished. She is active and cooperative.   HENT:   Head: Normocephalic.   Eyes: Lids are normal.   Wears glasses   Neck: Normal carotid pulses, no hepatojugular reflux and no JVD present. Carotid bruit is not present.   Cardiovascular: Normal rate, regular rhythm and normal heart sounds.    Pulses:       Radial pulses are 2+ on the right side, and 2+ on the left side.        Dorsalis pedis pulses are 2+ on the right side, and 2+ on the left side.        Posterior tibial pulses are 2+ on the right side, and 2+ on the left side.   Abdominal: Normal appearance and bowel sounds are normal.   Neurological: She is alert and oriented to person, place, and time.   Skin: Skin is warm, dry and intact.   Psychiatric: She has a normal mood and affect. Her speech is normal and behavior is normal. Judgment and thought content normal. Cognition and memory are normal.       Procedure   Procedures         Assessment/Plan      Diagnosis Plan   1. Supraventricular tachycardia     2. Essential hypertension     3. Dyslipidemia         Return in about 6 months (around 3/12/2018).       SVT-patient doing very well on flecainide.  Hypertension-patient doing very well.  Dyslipidemia-patient is diet controlled and doing well.  She will continue her medication regimen.  She will follow-up in 6 months or sooner if any changes.

## 2017-09-19 ENCOUNTER — OFFICE VISIT (OUTPATIENT)
Dept: FAMILY MEDICINE CLINIC | Facility: CLINIC | Age: 62
End: 2017-09-19

## 2017-09-19 VITALS
OXYGEN SATURATION: 98 % | SYSTOLIC BLOOD PRESSURE: 120 MMHG | BODY MASS INDEX: 29.57 KG/M2 | WEIGHT: 184 LBS | HEIGHT: 66 IN | HEART RATE: 64 BPM | DIASTOLIC BLOOD PRESSURE: 84 MMHG

## 2017-09-19 DIAGNOSIS — E03.9 ACQUIRED HYPOTHYROIDISM: ICD-10-CM

## 2017-09-19 DIAGNOSIS — R53.83 FATIGUE, UNSPECIFIED TYPE: ICD-10-CM

## 2017-09-19 DIAGNOSIS — H65.06 RECURRENT ACUTE SEROUS OTITIS MEDIA OF BOTH EARS: ICD-10-CM

## 2017-09-19 DIAGNOSIS — E05.00 GRAVES DISEASE: ICD-10-CM

## 2017-09-19 DIAGNOSIS — E86.0 DEHYDRATION: ICD-10-CM

## 2017-09-19 DIAGNOSIS — R42 DIZZINESS: ICD-10-CM

## 2017-09-19 DIAGNOSIS — Z12.31 ENCOUNTER FOR SCREENING MAMMOGRAM FOR BREAST CANCER: ICD-10-CM

## 2017-09-19 DIAGNOSIS — I10 ESSENTIAL HYPERTENSION: ICD-10-CM

## 2017-09-19 LAB
BILIRUB BLD-MCNC: NEGATIVE MG/DL
CLARITY, POC: CLEAR
COLOR UR: YELLOW
GLUCOSE UR STRIP-MCNC: NEGATIVE MG/DL
KETONES UR QL: NEGATIVE
LEUKOCYTE EST, POC: NEGATIVE
NITRITE UR-MCNC: NEGATIVE MG/ML
PH UR: 5 [PH] (ref 5–8)
PROT UR STRIP-MCNC: NEGATIVE MG/DL
RBC # UR STRIP: ABNORMAL /UL
SP GR UR: 1.03 (ref 1–1.03)
UROBILINOGEN UR QL: NORMAL

## 2017-09-19 PROCEDURE — 81003 URINALYSIS AUTO W/O SCOPE: CPT | Performed by: NURSE PRACTITIONER

## 2017-09-19 PROCEDURE — 99214 OFFICE O/P EST MOD 30 MIN: CPT | Performed by: NURSE PRACTITIONER

## 2017-09-19 RX ORDER — LEVOTHYROXINE SODIUM 137 UG/1
137 TABLET ORAL DAILY
Qty: 30 TABLET | Refills: 5 | Status: SHIPPED | OUTPATIENT
Start: 2017-09-19 | End: 2017-09-19 | Stop reason: SDUPTHER

## 2017-09-19 RX ORDER — FLUTICASONE PROPIONATE 50 MCG
2 SPRAY, SUSPENSION (ML) NASAL DAILY
Qty: 1 BOTTLE | Refills: 5 | Status: SHIPPED | OUTPATIENT
Start: 2017-09-19 | End: 2017-10-19

## 2017-09-19 RX ORDER — LEVOTHYROXINE SODIUM 137 UG/1
137 TABLET ORAL DAILY
Qty: 30 TABLET | Refills: 5 | Status: SHIPPED | OUTPATIENT
Start: 2017-09-19 | End: 2017-10-12 | Stop reason: SDUPTHER

## 2017-09-19 NOTE — PROGRESS NOTES
Subjective   Justina Ch is a 62 y.o. female.     HPI Comments:  presents today due to experiencing vertigo on several occasions. States that when she gets up to walk she has spinning in her head and her balance is off, this often lasts for several hours. She has had several episodes of vertigo within the past couple months. Patient states she drinks 2.5 water bottles a day. Drinks one cup of coffee a day, does not drink caffinated soda. Patient states she has been experiencing a muscle cramp type feeling in her upper body.          The following portions of the patient's history were reviewed and updated as appropriate: allergies, current medications, past family history, past medical history, past social history, past surgical history and problem list.    Review of Systems   Constitutional: Negative.    HENT: Negative.    Eyes: Negative.    Respiratory: Negative for apnea, cough, chest tightness, shortness of breath and wheezing.    Gastrointestinal: Negative.    Endocrine: Negative.    Genitourinary: Negative.    Musculoskeletal: Negative.    Skin: Negative.    Allergic/Immunologic: Negative.    Neurological: Positive for dizziness and syncope.   Hematological: Negative.    Psychiatric/Behavioral: Negative.        Objective   Physical Exam   Constitutional: She is oriented to person, place, and time. She appears well-developed and well-nourished. No distress.   HENT:   Head: Normocephalic.   Nose: Nose normal.   Mouth/Throat: Oropharynx is clear and moist. No oropharyngeal exudate.   Post nasal drainage noted in oropharynx. Clear fluid line present behind tympanic membrane bilaterally.    Eyes: Conjunctivae are normal. Pupils are equal, round, and reactive to light.   Neck: Normal range of motion. Neck supple.   Cardiovascular: Normal rate, regular rhythm, normal heart sounds and intact distal pulses.    No murmur heard.  Pulmonary/Chest: Effort normal and breath sounds normal. No respiratory  distress. She has no wheezes. She has no rales. She exhibits no tenderness.   Abdominal: Soft. Bowel sounds are normal. She exhibits no distension and no mass. There is no hepatosplenomegaly or splenomegaly. There is no tenderness. There is no rebound and no guarding. No hernia.   Musculoskeletal: Normal range of motion. She exhibits no edema or tenderness.   Lymphadenopathy:        Right cervical: No superficial cervical, no deep cervical and no posterior cervical adenopathy present.       Left cervical: No superficial cervical, no deep cervical and no posterior cervical adenopathy present.   Neurological: She is alert and oriented to person, place, and time. Coordination and gait normal.   Skin: Skin is warm and dry. No rash noted.   Psychiatric: She has a normal mood and affect. Her behavior is normal. Judgment and thought content normal.       Assessment/Plan   Justina was seen today for follow-up and dizziness.    Diagnoses and all orders for this visit:    Essential hypertension    Acquired hypothyroidism  -     levothyroxine (SYNTHROID, LEVOTHROID) 137 MCG tablet; Take 1 tablet by mouth Daily for 30 days.  -     TSH  -     T4, Free  -     T3, Free    Dizziness  -     CBC (No Diff)  -     Comprehensive Metabolic Panel    Recurrent acute serous otitis media of both ears  -     fluticasone (FLONASE) 50 MCG/ACT nasal spray; 2 sprays into each nostril Daily for 30 days.    Dehydration  -     CBC (No Diff)  -     Comprehensive Metabolic Panel    Encounter for screening mammogram for breast cancer  -     Mammo Screening Bilateral With CAD; Future    Labs and urine collected in office; UA, TSH, T3, T4, CBC, CMP.     I will contact patient regarding blood test results and provide instructions regarding any necessary changes in plan of care. Urine specific gravity indicated dehydration. Patient was instructed to increase water intake to at  least eight, 8 oz. glasses of water. I advised her this will help with her  dizziness.     Patient given order to obtain yearly Mammogram.    Refills given for Synthroid and new prescription given for Flonase. Patient instructed to take Flonase twice daily, one spray in each nostril for nasal allergy symptoms. Advised to continue taking her Zyrtec for allergy symptoms.  Resolution of the serous otitis will help with her dizziness as well.     Patient was encouraged to keep me informed of any acute changes, lack of improvement, or any new concerning symptoms.    Patient advised to return to office in 3 months for follow up and/or PRN.

## 2017-09-20 LAB
ALBUMIN SERPL-MCNC: 4.2 G/DL (ref 3.6–4.8)
ALBUMIN/GLOB SERPL: 1.8 {RATIO} (ref 1.2–2.2)
ALP SERPL-CCNC: 175 IU/L (ref 39–117)
ALT SERPL-CCNC: 28 IU/L (ref 0–32)
AST SERPL-CCNC: 27 IU/L (ref 0–40)
BILIRUB SERPL-MCNC: 0.3 MG/DL (ref 0–1.2)
BUN SERPL-MCNC: 8 MG/DL (ref 8–27)
BUN/CREAT SERPL: 11 (ref 12–28)
CALCIUM SERPL-MCNC: 8.9 MG/DL (ref 8.7–10.3)
CHLORIDE SERPL-SCNC: 103 MMOL/L (ref 96–106)
CO2 SERPL-SCNC: 24 MMOL/L (ref 18–29)
CREAT SERPL-MCNC: 0.76 MG/DL (ref 0.57–1)
ERYTHROCYTE [DISTWIDTH] IN BLOOD BY AUTOMATED COUNT: 13.3 % (ref 12.3–15.4)
GLOBULIN SER CALC-MCNC: 2.4 G/DL (ref 1.5–4.5)
GLUCOSE SERPL-MCNC: 80 MG/DL (ref 65–99)
HCT VFR BLD AUTO: 40.8 % (ref 34–46.6)
HGB BLD-MCNC: 14.2 G/DL (ref 11.1–15.9)
Lab: NORMAL
MCH RBC QN AUTO: 32.3 PG (ref 26.6–33)
MCHC RBC AUTO-ENTMCNC: 34.8 G/DL (ref 31.5–35.7)
MCV RBC AUTO: 93 FL (ref 79–97)
PLATELET # BLD AUTO: 197 X10E3/UL (ref 150–379)
POTASSIUM SERPL-SCNC: 4.1 MMOL/L (ref 3.5–5.2)
PROT SERPL-MCNC: 6.6 G/DL (ref 6–8.5)
RBC # BLD AUTO: 4.4 X10E6/UL (ref 3.77–5.28)
SODIUM SERPL-SCNC: 144 MMOL/L (ref 134–144)
T3FREE SERPL-MCNC: 2.8 PG/ML (ref 2–4.4)
T4 FREE SERPL-MCNC: 1.37 NG/DL (ref 0.82–1.77)
TSH SERPL DL<=0.005 MIU/L-ACNC: 1.19 UIU/ML (ref 0.45–4.5)
WBC # BLD AUTO: 7.2 X10E3/UL (ref 3.4–10.8)

## 2017-09-25 ENCOUNTER — TELEPHONE (OUTPATIENT)
Dept: FAMILY MEDICINE CLINIC | Facility: CLINIC | Age: 62
End: 2017-09-25

## 2017-09-25 NOTE — TELEPHONE ENCOUNTER
----- Message from JOSE Guzman sent at 9/21/2017  1:17 PM EDT -----  Her labs all look good.       Mailed results letter to pt.

## 2017-10-12 DIAGNOSIS — E03.9 ACQUIRED HYPOTHYROIDISM: ICD-10-CM

## 2017-10-12 RX ORDER — LEVOTHYROXINE SODIUM 137 UG/1
TABLET ORAL
Qty: 90 TABLET | Refills: 0 | Status: SHIPPED | OUTPATIENT
Start: 2017-10-12 | End: 2018-01-04 | Stop reason: SDUPTHER

## 2017-10-13 ENCOUNTER — TELEPHONE (OUTPATIENT)
Dept: FAMILY MEDICINE CLINIC | Facility: CLINIC | Age: 62
End: 2017-10-13

## 2017-10-27 NOTE — TELEPHONE ENCOUNTER
I dont see it anywhere. All I see is the one from September this year, not the one to compare it to.

## 2017-12-18 DIAGNOSIS — I10 ESSENTIAL HYPERTENSION: ICD-10-CM

## 2017-12-18 RX ORDER — LISINOPRIL 2.5 MG/1
TABLET ORAL
Qty: 30 TABLET | Refills: 5 | Status: SHIPPED | OUTPATIENT
Start: 2017-12-18 | End: 2018-05-15 | Stop reason: SDUPTHER

## 2018-01-04 DIAGNOSIS — E03.9 ACQUIRED HYPOTHYROIDISM: ICD-10-CM

## 2018-01-04 RX ORDER — LEVOTHYROXINE SODIUM 137 UG/1
TABLET ORAL
Qty: 90 TABLET | Refills: 0 | Status: SHIPPED | OUTPATIENT
Start: 2018-01-04 | End: 2018-03-19 | Stop reason: SDUPTHER

## 2018-01-30 DIAGNOSIS — R09.82 PND (POST-NASAL DRIP): ICD-10-CM

## 2018-01-30 DIAGNOSIS — J30.2 SEASONAL ALLERGIC RHINITIS: ICD-10-CM

## 2018-01-30 RX ORDER — CETIRIZINE HYDROCHLORIDE 10 MG/1
TABLET ORAL
Qty: 30 TABLET | Refills: 1 | Status: SHIPPED | OUTPATIENT
Start: 2018-01-30 | End: 2018-03-19 | Stop reason: SDUPTHER

## 2018-03-14 ENCOUNTER — OFFICE VISIT (OUTPATIENT)
Dept: CARDIOLOGY | Facility: CLINIC | Age: 63
End: 2018-03-14

## 2018-03-14 VITALS
BODY MASS INDEX: 29.67 KG/M2 | WEIGHT: 184.6 LBS | SYSTOLIC BLOOD PRESSURE: 116 MMHG | HEART RATE: 75 BPM | OXYGEN SATURATION: 100 % | DIASTOLIC BLOOD PRESSURE: 66 MMHG | HEIGHT: 66 IN

## 2018-03-14 DIAGNOSIS — I47.1 SVT (SUPRAVENTRICULAR TACHYCARDIA) (HCC): ICD-10-CM

## 2018-03-14 DIAGNOSIS — I49.1 PAC (PREMATURE ATRIAL CONTRACTION): ICD-10-CM

## 2018-03-14 DIAGNOSIS — E78.5 DYSLIPIDEMIA: ICD-10-CM

## 2018-03-14 DIAGNOSIS — I10 ESSENTIAL HYPERTENSION: Primary | ICD-10-CM

## 2018-03-14 PROCEDURE — 93000 ELECTROCARDIOGRAM COMPLETE: CPT | Performed by: NURSE PRACTITIONER

## 2018-03-14 PROCEDURE — 99214 OFFICE O/P EST MOD 30 MIN: CPT | Performed by: NURSE PRACTITIONER

## 2018-03-14 RX ORDER — FLECAINIDE ACETATE 150 MG/1
75 TABLET ORAL 2 TIMES DAILY
Qty: 90 TABLET | Refills: 3 | Status: SHIPPED | OUTPATIENT
Start: 2018-03-14 | End: 2019-03-26 | Stop reason: SDUPTHER

## 2018-03-14 NOTE — PROGRESS NOTES
Subjective   Justina Ch is a 62 y.o. female     Chief Complaint   Patient presents with   • Shortness of Breath     presents as a follow up       HPI    Problem list:    1. SVT - flecainide therapy  1.2 Event Monitor 5/7-5/20-16 - NSR with PACs and PVCs  2. Bradycardia  3. Left atrial tachycardia and right atrial isthmus-dependent flutter   3.1 RFA 10/14/2014  4. Grave’s disease, status post ablation with hypothyroidism.   5. Dyslipidemia.   6. Hypertension  6.1 Stress test 7/23/14 - low risk, no ischemia   6.2 Stress Test 7/12/17-no ischemia, low risk  6.3 Echo 7/21/14 - EF 55-60%; DD II; trace MR, TR  6.4 Echo 7/12/17-EF greater than 65%, diastolic dysfunction 1, trace TR, trace NC, PA 20-25  7. Cardiomegaly  7.1 CT Chest 8/5/15 - Cardiomegaly; suspected hepatosplenomegaly with fatty liver    Patient is a 62-year-old female who presents today for follow-up.  She denies any chest pain or pressure.  She says she has a little bit of palpitations every day.  She says a gotten a lot better and then all of a sudden she started having quite a bit however she been under a lot of stress with her mother who has dementia.  She says it has slowed back down.  She says she did have an episode of dizziness 2 months ago where she felt like she is going to pass out but she had absolutely no other symptoms with it and it came out of the blue.  She denies any presyncope, syncope, orthopnea, PND or edema.  She only gets short of breath whenever she does more than normal like taking the stairs.  She has been going to the gym 3-4 times a week and walking 3 miles no problem..    Current Outpatient Prescriptions   Medication Sig Dispense Refill   • aspirin 81 MG tablet Take 1 tablet by mouth daily.     • cetirizine (zyrTEC) 10 MG tablet TAKE 1 TABLET BY MOUTH DAILY 30 tablet 1   • flecainide (TAMBOCOR) 150 MG tablet Take 0.5 tablets by mouth 2 (Two) Times a Day. 90 tablet 3   • levothyroxine (SYNTHROID, LEVOTHROID) 137 MCG tablet TAKE  1 TABLET BY MOUTH EVERY DAY 90 tablet 0   • lisinopril (PRINIVIL,ZESTRIL) 2.5 MG tablet TAKE 1 TABLET BY MOUTH EVERY DAY 30 tablet 5   • nitroglycerin (NITROSTAT) 0.4 MG SL tablet 1 under the tongue as needed for angina, may repeat q5mins for up three doses 30 tablet 3     No current facility-administered medications for this visit.        ALLERGIES    Review of patient's allergies indicates no known allergies.    Past Medical History:   Diagnosis Date   • Arrhythmia    • Enlarged heart    • Graves' disease    • Hx of mammogram 09/2016 9/16   • Hyperlipidemia    • Hypertension 7/27/2016       Social History     Social History   • Marital status:      Spouse name: N/A   • Number of children: N/A   • Years of education: N/A     Occupational History   • Not on file.     Social History Main Topics   • Smoking status: Former Smoker     Quit date: 5/4/2001   • Smokeless tobacco: Never Used   • Alcohol use No   • Drug use: No   • Sexual activity: Defer     Other Topics Concern   • Not on file     Social History Narrative   • No narrative on file       Family History   Problem Relation Age of Onset   • Hypertension Mother    • Breast cancer Mother    • Bone cancer Mother    • Heart attack Father    • Heart disease Father      pacemaker   • Hyperlipidemia Father    • Hypertension Father    • Breast cancer Sister    • Ovarian cancer Sister    • Diabetes Paternal Grandmother        Review of Systems   Constitutional: Positive for fatigue. Negative for diaphoresis.   HENT: Negative for rhinorrhea, sinus pressure and sneezing.    Eyes: Positive for visual disturbance (wears glasses ).   Respiratory: Positive for shortness of breath (with exertion; more than normal like stairs, going to gym and walking). Negative for chest tightness.    Cardiovascular: Positive for palpitations (every day for a little bit then slowed down, still have them every day, just here and there a little bit). Negative for chest pain and leg  "swelling.   Gastrointestinal: Negative for constipation, diarrhea, nausea and vomiting.   Endocrine: Negative.    Genitourinary: Negative for difficulty urinating.   Musculoskeletal: Negative for arthralgias, back pain, myalgias and neck pain.   Skin: Negative.    Allergic/Immunologic: Negative for environmental allergies.   Neurological: Positive for dizziness (an episode 2 mos ago, almost passed out, room swirling, lasted couple of min; out of blue no other problems since). Negative for syncope, light-headedness and headaches.   Hematological: Bruises/bleeds easily.   Psychiatric/Behavioral: The patient is not nervous/anxious.        Objective   /66 (BP Location: Left arm, Patient Position: Sitting)   Pulse 75   Ht 167.6 cm (66\")   Wt 83.7 kg (184 lb 9.6 oz)   SpO2 100%   BMI 29.80 kg/m²   Vitals:    03/14/18 1049   BP: 116/66   BP Location: Left arm   Patient Position: Sitting   Pulse: 75   SpO2: 100%   Weight: 83.7 kg (184 lb 9.6 oz)   Height: 167.6 cm (66\")      Lab Results (most recent)     None        Physical Exam   Constitutional: She is oriented to person, place, and time. Vital signs are normal. She appears well-developed and well-nourished. She is active and cooperative.   HENT:   Head: Normocephalic.   Eyes: Lids are normal.   Wears glasses    Neck: Normal carotid pulses, no hepatojugular reflux and no JVD present. Carotid bruit is not present.   Cardiovascular: Normal rate, regular rhythm and normal heart sounds.    Pulses:       Radial pulses are 2+ on the right side, and 2+ on the left side.        Dorsalis pedis pulses are 2+ on the right side, and 2+ on the left side.        Posterior tibial pulses are 2+ on the right side, and 2+ on the left side.   Trace edema BLE.    Pulmonary/Chest: Effort normal and breath sounds normal.   Abdominal: Normal appearance and bowel sounds are normal.   Neurological: She is alert and oriented to person, place, and time.   Skin: Skin is warm, dry and " intact.   Psychiatric: She has a normal mood and affect. Her speech is normal and behavior is normal. Judgment and thought content normal. Cognition and memory are normal.       Procedure     ECG 12 Lead  Date/Time: 3/14/2018 11:12 AM  Performed by: ИВАН KEARNS  Authorized by: ИВАН KEARNS   Comparison: compared with previous ECG from 6/23/2017  Rhythm: sinus rhythm  Rate: normal  BPM: 62  QRS axis: normal  Clinical impression: normal ECG  Comments: QT/Qtc 411/416                 Assessment/Plan      Diagnosis Plan   1. Essential hypertension  ECG 12 Lead   2. SVT (supraventricular tachycardia)  flecainide (TAMBOCOR) 150 MG tablet    ECG 12 Lead   3. PAC (premature atrial contraction)  ECG 12 Lead   4. Dyslipidemia         Return in about 6 months (around 9/14/2018).       hypertension-patient doing well.  SVT-patient is on flecainide.  PACs again patient is on flecainide.  Dyslipidemia-patient is diet controlled.  She will continue her medication regimen.  She will follow-up in 6 months or sooner if any changes.  Discussed the patient's BMI with her. BMI is above normal parameters. Follow-up plan includes:  educational material.      Electronically signed by:

## 2018-03-14 NOTE — PATIENT INSTRUCTIONS
Obesity, Adult  Obesity is the condition of having too much total body fat. Being overweight or obese means that your weight is greater than what is considered healthy for your body size. Obesity is determined by a measurement called BMI. BMI is an estimate of body fat and is calculated from height and weight. For adults, a BMI of 30 or higher is considered obese.  Obesity can eventually lead to other health concerns and major illnesses, including:  · Stroke.  · Coronary artery disease (CAD).  · Type 2 diabetes.  · Some types of cancer, including cancers of the colon, breast, uterus, and gallbladder.  · Osteoarthritis.  · High blood pressure (hypertension).  · High cholesterol.  · Sleep apnea.  · Gallbladder stones.  · Infertility problems.  What are the causes?  The main cause of obesity is taking in (consuming) more calories than your body uses for energy. Other factors that contribute to this condition may include:  · Being born with genes that make you more likely to become obese.  · Having a medical condition that causes obesity. These conditions include:  ¨ Hypothyroidism.  ¨ Polycystic ovarian syndrome (PCOS).  ¨ Binge-eating disorder.  ¨ Cushing syndrome.  · Taking certain medicines, such as steroids, antidepressants, and seizure medicines.  · Not being physically active (sedentary lifestyle).  · Living where there are limited places to exercise safely or buy healthy foods.  · Not getting enough sleep.  What increases the risk?  The following factors may increase your risk of this condition:  · Having a family history of obesity.  · Being a woman of -American descent.  · Being a man of  descent.  What are the signs or symptoms?  Having excessive body fat is the main symptom of this condition.  How is this diagnosed?  This condition may be diagnosed based on:  · Your symptoms.  · Your medical history.  · A physical exam. Your health care provider may measure:  ¨ Your BMI. If you are an adult  with a BMI between 25 and less than 30, you are considered overweight. If you are an adult with a BMI of 30 or higher, you are considered obese.  ¨ The distances around your hips and your waist (circumferences). These may be compared to each other to help diagnose your condition.  ¨ Your skinfold thickness. Your health care provider may gently pinch a fold of your skin and measure it.  How is this treated?  Treatment for this condition often includes changing your lifestyle. Treatment may include some or all of the following:  · Dietary changes. Work with your health care provider and a dietitian to set a weight-loss goal that is healthy and reasonable for you. Dietary changes may include eating:  ¨ Smaller portions. A portion size is the amount of a particular food that is healthy for you to eat at one time. This varies from person to person.  ¨ Low-calorie or low-fat options.  ¨ More whole grains, fruits, and vegetables.  · Regular physical activity. This may include aerobic activity (cardio) and strength training.  · Medicine to help you lose weight. Your health care provider may prescribe medicine if you are unable to lose 1 pound a week after 6 weeks of eating more healthily and doing more physical activity.  · Surgery. Surgical options may include gastric banding and gastric bypass. Surgery may be done if:  ¨ Other treatments have not helped to improve your condition.  ¨ You have a BMI of 40 or higher.  ¨ You have life-threatening health problems related to obesity.  Follow these instructions at home:     Eating and drinking     · Follow recommendations from your health care provider about what you eat and drink. Your health care provider may advise you to:  ¨ Limit fast foods, sweets, and processed snack foods.  ¨ Choose low-fat options, such as low-fat milk instead of whole milk.  ¨ Eat 5 or more servings of fruits or vegetables every day.  ¨ Eat at home more often. This gives you more control over what you  eat.  ¨ Choose healthy foods when you eat out.  ¨ Learn what a healthy portion size is.  ¨ Keep low-fat snacks on hand.  ¨ Avoid sugary drinks, such as soda, fruit juice, iced tea sweetened with sugar, and flavored milk.  ¨ Eat a healthy breakfast.  · Drink enough water to keep your urine clear or pale yellow.  · Do not go without eating for long periods of time (do not fast) or follow a fad diet. Fasting and fad diets can be unhealthy and even dangerous.  Physical Activity   · Exercise regularly, as told by your health care provider. Ask your health care provider what types of exercise are safe for you and how often you should exercise.  · Warm up and stretch before being active.  · Cool down and stretch after being active.  · Rest between periods of activity.  Lifestyle   · Limit the time that you spend in front of your TV, computer, or video game system.  · Find ways to reward yourself that do not involve food.  · Limit alcohol intake to no more than 1 drink a day for nonpregnant women and 2 drinks a day for men. One drink equals 12 oz of beer, 5 oz of wine, or 1½ oz of hard liquor.  General instructions   · Keep a weight loss journal to keep track of the food you eat and how much you exercise you get.  · Take over-the-counter and prescription medicines only as told by your health care provider.  · Take vitamins and supplements only as told by your health care provider.  · Consider joining a support group. Your health care provider may be able to recommend a support group.  · Keep all follow-up visits as told by your health care provider. This is important.  Contact a health care provider if:  · You are unable to meet your weight loss goal after 6 weeks of dietary and lifestyle changes.  This information is not intended to replace advice given to you by your health care provider. Make sure you discuss any questions you have with your health care provider.  Document Released: 01/25/2006 Document Revised:  05/22/2017 Document Reviewed: 10/05/2016  [employer] Interactive Patient Education © 2017 [employer] Inc.  MyPlate from [employer]  The general, healthful diet is based on the 2010 Dietary Guidelines for Americans. The amount of food you need to eat from each food group depends on your age, sex, and level of physical activity and can be individualized by a dietitian. Go to ChooseMyPlate.gov for more information.  What do I need to know about the MyPlate plan?  · Enjoy your food, but eat less.  · Avoid oversized portions.  ¨ ½ of your plate should include fruits and vegetables.  ¨ ¼ of your plate should be grains.  ¨ ¼ of your plate should be protein.  Grains   · Make at least half of your grains whole grains.  · For a 2,000 calorie daily food plan, eat 6 oz every day.  · 1 oz is about 1 slice bread, 1 cup cereal, or ½ cup cooked rice, cereal, or pasta.  Vegetables   · Make half your plate fruits and vegetables.  · For a 2,000 calorie daily food plan, eat 2½ cups every day.  · 1 cup is about 1 cup raw or cooked vegetables or vegetable juice or 2 cups raw leafy greens.  Fruits   · Make half your plate fruits and vegetables.  · For a 2,000 calorie daily food plan, eat 2 cups every day.  · 1 cup is about 1 cup fruit or 100% fruit juice or ½ cup dried fruit.  Protein   · For a 2,000 calorie daily food plan, eat 5½ oz every day.  · 1 oz is about 1 oz meat, poultry, or fish, ¼ cup cooked beans, 1 egg, 1 Tbsp peanut butter, or ½ oz nuts or seeds.  Dairy   · Switch to fat-free or low-fat (1%) milk.  · For a 2,000 calorie daily food plan, eat 3 cups every day.  · 1 cup is about 1 cup milk or yogurt or soy milk (soy beverage), 1½ oz natural cheese, or 2 oz processed cheese.  Fats, Oils, and Empty Calories   · Only small amounts of oils are recommended.  · Empty calories are calories from solid fats or added sugars.  · Compare sodium in foods like soup, bread, and frozen meals. Choose the foods with lower numbers.  · Drink water instead  of sugary drinks.  What foods can I eat?  Grains   Whole grains such as whole wheat, quinoa, millet, and bulgur. Bread, rolls, and pasta made from whole grains. Brown or wild rice. Hot or cold cereals made from whole grains and without added sugar.  Vegetables   All fresh vegetables, especially fresh red, dark green, or orange vegetables. Peas and beans. Low-sodium frozen or canned vegetables prepared without added salt. Low-sodium vegetable juices.  Fruits   All fresh, frozen, and dried fruits. Canned fruit packed in water or fruit juice without added sugar. Fruit juices without added sugar.  Meats and Other Protein Sources   Boiled, baked, or grilled lean meat trimmed of fat. Skinless poultry. Fresh seafood and shellfish. Canned seafood packed in water. Unsalted nuts and unsalted nut butters. Tofu. Dried beans and pea. Eggs.  Dairy   Low-fat or fat-free milk, yogurt, and cheeses.  Sweets and Desserts   Frozen desserts made from low-fat milk.  Fats and Oils   Olive, peanut, and canola oils and margarine. Salad dressing and mayonnaise made from these oils.  Other   Soups and casseroles made from allowed ingredients and without added fat or salt.  The items listed above may not be a complete list of recommended foods or beverages. Contact your dietitian for more options.   What foods are not recommended?  Grains   Sweetened, low-fiber cereals. Packaged baked goods. Snack crackers and chips. Cheese crackers, butter crackers, and biscuits. Frozen waffles, sweet breads, doughnuts, pastries, packaged baking mixes, pancakes, cakes, and cookies.  Vegetables   Regular canned or frozen vegetables or vegetables prepared with salt. Canned tomatoes. Canned tomato sauce. Fried vegetables. Vegetables in cream sauce or cheese sauce.  Fruits   Fruits packed in syrup or made with added sugar.  Meats and Other Protein Sources   Marbled or fatty meats such as ribs. Poultry with skin. Fried meats, poultry, eggs, or fish. Sausages, hot  dogs, and deli meats such as pastrami, bologna, or salami.  Dairy   Whole milk, cream, cheeses made from whole milk, sour cream. Ice cream or yogurt made from whole milk or with added sugar.  Beverages   For adults, no more than one alcoholic drink per day. Regular soft drinks or other sugary beverages. Juice drinks.  Sweets and Desserts   Sugary or fatty desserts, candy, and other sweets.  Fats and Oils   Solid shortening or partially hydrogenated oils. Solid margarine. Margarine that contains trans fats. Butter.  The items listed above may not be a complete list of foods and beverages to avoid. Contact your dietitian for more information.   This information is not intended to replace advice given to you by your health care provider. Make sure you discuss any questions you have with your health care provider.  Document Released: 01/06/2009 Document Revised: 05/25/2017 Document Reviewed: 11/26/2014  [employer] Interactive Patient Education © 2017 Elsevier Inc.

## 2018-03-19 ENCOUNTER — OFFICE VISIT (OUTPATIENT)
Dept: FAMILY MEDICINE CLINIC | Facility: CLINIC | Age: 63
End: 2018-03-19

## 2018-03-19 VITALS
WEIGHT: 183 LBS | HEART RATE: 74 BPM | DIASTOLIC BLOOD PRESSURE: 74 MMHG | HEIGHT: 66 IN | TEMPERATURE: 98 F | SYSTOLIC BLOOD PRESSURE: 118 MMHG | OXYGEN SATURATION: 98 % | BODY MASS INDEX: 29.41 KG/M2

## 2018-03-19 DIAGNOSIS — E03.9 ACQUIRED HYPOTHYROIDISM: ICD-10-CM

## 2018-03-19 DIAGNOSIS — R09.82 PND (POST-NASAL DRIP): ICD-10-CM

## 2018-03-19 DIAGNOSIS — J30.2 SEASONAL ALLERGIC RHINITIS, UNSPECIFIED CHRONICITY, UNSPECIFIED TRIGGER: ICD-10-CM

## 2018-03-19 DIAGNOSIS — E78.5 DYSLIPIDEMIA: ICD-10-CM

## 2018-03-19 PROCEDURE — 99214 OFFICE O/P EST MOD 30 MIN: CPT | Performed by: NURSE PRACTITIONER

## 2018-03-19 RX ORDER — CETIRIZINE HYDROCHLORIDE 10 MG/1
10 TABLET ORAL DAILY
Qty: 30 TABLET | Refills: 2 | Status: SHIPPED | OUTPATIENT
Start: 2018-03-19 | End: 2018-06-26 | Stop reason: SDUPTHER

## 2018-03-19 RX ORDER — LEVOTHYROXINE SODIUM 137 UG/1
137 TABLET ORAL DAILY
Qty: 90 TABLET | Refills: 0 | Status: SHIPPED | OUTPATIENT
Start: 2018-03-19 | End: 2018-06-19 | Stop reason: SDUPTHER

## 2018-03-19 NOTE — PROGRESS NOTES
Akash Ch is a 62 y.o. female.     Patient is here today for follow up on hypothyroidism and allergic rhinitis. She states she is taking the Synthroid and Zyrtec as directed and tolerating them well. She states she feels well and her medications are working well.     She saw her Cardiologist,  Dr Camacho last week, and everything was ok and an EKG was normal.  She sees Dr Claudio.on Friday. They manager her hypertension and SVT. She states they did not check her cholesterol for monitoring of her hyperlipidemia. She needs those labs checked.          The following portions of the patient's history were reviewed and updated as appropriate: allergies, current medications, past family history, past medical history, past social history, past surgical history and problem list.    Review of Systems   Constitutional: Negative.    HENT: Negative.    Eyes: Negative.    Respiratory: Negative for apnea, cough, chest tightness, shortness of breath and wheezing.    Gastrointestinal: Negative.    Endocrine: Negative.    Genitourinary: Negative.    Musculoskeletal: Negative.    Skin: Negative.    Allergic/Immunologic: Negative.    Neurological: Negative.    Hematological: Negative.    Psychiatric/Behavioral: Negative.        Objective   Physical Exam   Constitutional: She is oriented to person, place, and time. She appears well-developed and well-nourished. No distress.   HENT:   Head: Normocephalic.   Right Ear: External ear normal.   Left Ear: External ear normal.   Nose: Nose normal.   Mouth/Throat: No oropharyngeal exudate.   Eyes: Conjunctivae are normal.   Neck: Normal range of motion. Neck supple. No tracheal deviation present. No thyromegaly present.   Cardiovascular: Normal rate, regular rhythm and normal heart sounds.    No murmur heard.  Pulmonary/Chest: Effort normal and breath sounds normal. No respiratory distress. She has no wheezes. She has no rales. She exhibits no tenderness.   Abdominal: Soft.  Bowel sounds are normal. She exhibits no distension and no mass. There is no hepatosplenomegaly or splenomegaly. There is no tenderness. There is no rebound and no guarding. No hernia.   Musculoskeletal: Normal range of motion. She exhibits no edema or tenderness.   Neurological: She is alert and oriented to person, place, and time. Coordination and gait normal.   Skin: Skin is warm and dry. No rash noted.   Psychiatric: She has a normal mood and affect. Her behavior is normal. Judgment and thought content normal.   Nursing note and vitals reviewed.      Assessment/Plan   Justina was seen today for follow-up, hypertension and hypothyroidism.    Diagnoses and all orders for this visit:    Acquired hypothyroidism  -     levothyroxine (SYNTHROID, LEVOTHROID) 137 MCG tablet; Take 1 tablet by mouth Daily.  -     Comprehensive Metabolic Panel  -     TSH  -     T4, Free    Seasonal allergic rhinitis, unspecified chronicity, unspecified trigger  -     cetirizine (zyrTEC) 10 MG tablet; Take 1 tablet by mouth Daily.    PND (post-nasal drip)  -     cetirizine (zyrTEC) 10 MG tablet; Take 1 tablet by mouth Daily.    Dyslipidemia  -     Lipid Panel; Future      Screening labs ordered and patient to RTC fasting. I will contact patient regarding test results and provide instructions regarding any necessary changes in plan of care.    Patient to continue all medications as directed.    Patient to RTC in 6 months for follow up and prn.

## 2018-03-23 ENCOUNTER — OFFICE VISIT (OUTPATIENT)
Dept: CARDIOLOGY | Facility: CLINIC | Age: 63
End: 2018-03-23

## 2018-03-23 VITALS
HEART RATE: 66 BPM | BODY MASS INDEX: 29.66 KG/M2 | WEIGHT: 178 LBS | DIASTOLIC BLOOD PRESSURE: 70 MMHG | HEIGHT: 65 IN | SYSTOLIC BLOOD PRESSURE: 114 MMHG

## 2018-03-23 DIAGNOSIS — I49.1 PAC (PREMATURE ATRIAL CONTRACTION): ICD-10-CM

## 2018-03-23 DIAGNOSIS — I47.1 ATRIAL TACHYCARDIA (HCC): Primary | ICD-10-CM

## 2018-03-23 PROCEDURE — 99213 OFFICE O/P EST LOW 20 MIN: CPT | Performed by: INTERNAL MEDICINE

## 2018-03-23 NOTE — PROGRESS NOTES
Justina Ch  1955  [phone]      03/23/2018    [facility] CARDIOLOGY     Emma Mccann, APRN  [address]  [place] [address]    Chief Complaint   Patient presents with   • pac's       Problem List:   1. Supraventricular tachycardia:   a. History of tachypalpitations and emergency room visits dating back to at least 2-3 years ago.   b. Emergency room visit in July with subsequent hospitalization for supraventricular tachycardia at [facility] for initiation of flecainide.   c. Event recorder, July 2014, demonstrating runs of narrow QRS tachycardia, as fast as 190-200 beats per minute.   d. Echocardiogram, 07/21/2014: Normal left ventricular size and function with ejection fraction of 55%. No significant valvular insufficiency.   e. [facility] Cardiolite, database normal, per patient, database incomplete.  f. Status post EP study and RFA of left atrial tachycardia and right atrial isthmus-dependent flutter on 10/04/2014.  g. Recurrent tachypalpitations with event monitor, 01/23/2015 thru 02/21/2015, showing sinus rhythm with occasional PAC and sinus arrhythmia and no SVT or atrial fibrillation.   h. Initiation of Tambocor therapy for suppression of PACs, 02/27/2015.   i. Echo 7/12/17 EF 65%, trace TR  j. Nuclear stress test 7/12/17 EF 75%, no ischemia  2. Grave’s disease, status post ablation with hypothyroidism.   3. Dyslipidemia.   4. Hypertension.   5. Skin cancer.   6. Surgical history:  a. D and C.   b. Skin excision for skin cancer.  Allergies  No Known Allergies    Current Medications    Current Outpatient Prescriptions:   •  aspirin 81 MG tablet, Take 1 tablet by mouth daily., Disp: , Rfl:   •  cetirizine (zyrTEC) 10 MG tablet, Take 1 tablet by mouth Daily., Disp: 30 tablet, Rfl: 2  •  flecainide (TAMBOCOR) 150 MG tablet, Take 0.5 tablets by mouth 2 (Two) Times a Day., Disp: 90 tablet, Rfl: 3  •  levothyroxine (SYNTHROID, LEVOTHROID) 137  "MCG tablet, Take 1 tablet by mouth Daily., Disp: 90 tablet, Rfl: 0  •  lisinopril (PRINIVIL,ZESTRIL) 2.5 MG tablet, TAKE 1 TABLET BY MOUTH EVERY DAY, Disp: 30 tablet, Rfl: 5  •  nitroglycerin (NITROSTAT) 0.4 MG SL tablet, 1 under the tongue as needed for angina, may repeat q5mins for up three doses, Disp: 30 tablet, Rfl: 3    History of Present Illness   HPI    Pt presents for follow up of PACs on Flecainide . Since we last saw the pt she states her palpitations have occured sometimes several times a day for several weeks in January, no correlating factors.  She denies SOB, CP, LH, and dizziness. Denies any hospitalizations, ER visits, bleeding, or TIA/CVA symptoms. Overall feels well, she did have one episode of presyncope early in the morning but has not occurred since.  She had normal echo and stress test in July that was WNL. BP at home stable.     ROS:  General:  Denies fatigue, + weight gain (up 6 lbs)   Cardiovascular:  Denies CP, PND, syncope, + near syncope, No edema + palpitations.  Pulmonary:  Denies KIRKPATRICK, cough, or wheezing      Vitals:    03/23/18 0958   BP: 114/70   BP Location: Left arm   Patient Position: Sitting   Pulse: 66   Weight: 80.7 kg (178 lb)   Height: 165.1 cm (65\")     Body mass index is 29.62 kg/m².  PE:  General: NAD  Neck: no JVD, no carotid bruits, no TM  Heart RRR, NL S1, S2,  no rubs, murmurs  Lungs: CTA, no wheezes, rhonchi, or rales  Abd: soft, non-tender, NL BS  Ext: No musculoskeletal deformities, no edema, cyanosis, or clubbing  Psych: normal mood and affect    Diagnostic Data:      Procedures    1. Left atrial tachycardia and right atrial isthmus-dependent flutter     2. PAC (premature atrial contraction)          Plan:  1) PACs - pretty well controlled on Flecainide.  Normal stress test and echo in July.  Continue present medications.   2) AT/AFL: No recurrence s/p RFA.  3) HTN - well controlled, continue Lisinopril.  Wt loss, exercise, salt reduction    F/up in 8 months    I, " Tamiko Monae RN, scribed this note for Jose Cage MD 3/23/2018 10:34 AM.    I, Jose Cage MD, personally performed the services described in this documentation as scribed by the above named individual in my presence, and it is both accurate and complete.  3/23/2018  10:38 AM

## 2018-03-24 ENCOUNTER — RESULTS ENCOUNTER (OUTPATIENT)
Dept: FAMILY MEDICINE CLINIC | Facility: CLINIC | Age: 63
End: 2018-03-24

## 2018-03-24 DIAGNOSIS — E78.5 DYSLIPIDEMIA: ICD-10-CM

## 2018-03-29 LAB
ALBUMIN SERPL-MCNC: 4.2 G/DL (ref 3.5–5)
ALBUMIN/GLOB SERPL: 1.5 G/DL (ref 1–2)
ALP SERPL-CCNC: 168 U/L (ref 38–126)
ALT SERPL-CCNC: 50 U/L (ref 13–69)
AST SERPL-CCNC: 37 U/L (ref 15–46)
BILIRUB SERPL-MCNC: 0.5 MG/DL (ref 0.2–1.3)
BUN SERPL-MCNC: 15 MG/DL (ref 7–20)
BUN/CREAT SERPL: 18.8 (ref 7.1–23.5)
CALCIUM SERPL-MCNC: 9.4 MG/DL (ref 8.4–10.2)
CHLORIDE SERPL-SCNC: 104 MMOL/L (ref 98–107)
CO2 SERPL-SCNC: 28 MMOL/L (ref 26–30)
CREAT SERPL-MCNC: 0.8 MG/DL (ref 0.6–1.3)
GFR SERPLBLD CREATININE-BSD FMLA CKD-EPI: 73 ML/MIN/1.73
GFR SERPLBLD CREATININE-BSD FMLA CKD-EPI: 88 ML/MIN/1.73
GLOBULIN SER CALC-MCNC: 2.8 GM/DL
GLUCOSE SERPL-MCNC: 82 MG/DL (ref 74–98)
POTASSIUM SERPL-SCNC: 4.1 MMOL/L (ref 3.5–5.1)
PROT SERPL-MCNC: 7 G/DL (ref 6.3–8.2)
SODIUM SERPL-SCNC: 144 MMOL/L (ref 137–145)
T4 FREE SERPL-MCNC: 1.57 NG/DL (ref 0.78–2.19)
TSH SERPL DL<=0.005 MIU/L-ACNC: 1.14 MIU/ML (ref 0.47–4.68)

## 2018-03-30 DIAGNOSIS — I47.1 SVT (SUPRAVENTRICULAR TACHYCARDIA) (HCC): ICD-10-CM

## 2018-04-02 RX ORDER — FLECAINIDE ACETATE 150 MG/1
TABLET ORAL
Qty: 30 TABLET | Refills: 5 | Status: SHIPPED | OUTPATIENT
Start: 2018-04-02 | End: 2018-08-17 | Stop reason: SDUPTHER

## 2018-05-15 DIAGNOSIS — I10 ESSENTIAL HYPERTENSION: ICD-10-CM

## 2018-05-15 RX ORDER — LISINOPRIL 2.5 MG/1
TABLET ORAL
Qty: 30 TABLET | Refills: 0 | Status: SHIPPED | OUTPATIENT
Start: 2018-05-15 | End: 2018-06-07 | Stop reason: SDUPTHER

## 2018-06-07 DIAGNOSIS — I10 ESSENTIAL HYPERTENSION: ICD-10-CM

## 2018-06-07 RX ORDER — LISINOPRIL 2.5 MG/1
TABLET ORAL
Qty: 30 TABLET | Refills: 5 | Status: SHIPPED | OUTPATIENT
Start: 2018-06-07 | End: 2018-09-16 | Stop reason: SDUPTHER

## 2018-06-19 DIAGNOSIS — E03.9 ACQUIRED HYPOTHYROIDISM: ICD-10-CM

## 2018-06-22 RX ORDER — LEVOTHYROXINE SODIUM 137 UG/1
137 TABLET ORAL DAILY
Qty: 90 TABLET | Refills: 0 | Status: SHIPPED | OUTPATIENT
Start: 2018-06-22 | End: 2018-10-24 | Stop reason: SDUPTHER

## 2018-06-26 DIAGNOSIS — R09.82 PND (POST-NASAL DRIP): ICD-10-CM

## 2018-06-26 DIAGNOSIS — J30.2 SEASONAL ALLERGIC RHINITIS, UNSPECIFIED CHRONICITY, UNSPECIFIED TRIGGER: ICD-10-CM

## 2018-06-26 RX ORDER — CETIRIZINE HYDROCHLORIDE 10 MG/1
10 TABLET ORAL DAILY
Qty: 30 TABLET | Refills: 0 | Status: SHIPPED | OUTPATIENT
Start: 2018-06-26 | End: 2018-07-25 | Stop reason: SDUPTHER

## 2018-07-25 DIAGNOSIS — R09.82 PND (POST-NASAL DRIP): ICD-10-CM

## 2018-07-25 DIAGNOSIS — J30.2 SEASONAL ALLERGIC RHINITIS, UNSPECIFIED CHRONICITY, UNSPECIFIED TRIGGER: ICD-10-CM

## 2018-07-25 RX ORDER — CETIRIZINE HYDROCHLORIDE 10 MG/1
10 TABLET ORAL DAILY
Qty: 30 TABLET | Refills: 0 | Status: SHIPPED | OUTPATIENT
Start: 2018-07-25 | End: 2018-08-22 | Stop reason: SDUPTHER

## 2018-08-17 DIAGNOSIS — I47.1 SVT (SUPRAVENTRICULAR TACHYCARDIA) (HCC): ICD-10-CM

## 2018-08-20 RX ORDER — FLECAINIDE ACETATE 150 MG/1
TABLET ORAL
Qty: 90 TABLET | Refills: 5 | Status: SHIPPED | OUTPATIENT
Start: 2018-08-20 | End: 2018-09-13 | Stop reason: SDUPTHER

## 2018-08-22 DIAGNOSIS — R09.82 PND (POST-NASAL DRIP): ICD-10-CM

## 2018-08-22 DIAGNOSIS — J30.2 SEASONAL ALLERGIC RHINITIS, UNSPECIFIED CHRONICITY, UNSPECIFIED TRIGGER: ICD-10-CM

## 2018-08-22 RX ORDER — CETIRIZINE HYDROCHLORIDE 10 MG/1
10 TABLET ORAL DAILY
Qty: 30 TABLET | Refills: 5 | Status: SHIPPED | OUTPATIENT
Start: 2018-08-22 | End: 2019-02-17 | Stop reason: SDUPTHER

## 2018-09-13 ENCOUNTER — OFFICE VISIT (OUTPATIENT)
Dept: CARDIOLOGY | Facility: CLINIC | Age: 63
End: 2018-09-13

## 2018-09-13 ENCOUNTER — LAB (OUTPATIENT)
Dept: LAB | Facility: HOSPITAL | Age: 63
End: 2018-09-13

## 2018-09-13 VITALS
HEART RATE: 67 BPM | WEIGHT: 187.6 LBS | RESPIRATION RATE: 20 BRPM | DIASTOLIC BLOOD PRESSURE: 79 MMHG | SYSTOLIC BLOOD PRESSURE: 137 MMHG | HEIGHT: 66 IN | BODY MASS INDEX: 30.15 KG/M2 | OXYGEN SATURATION: 96 %

## 2018-09-13 DIAGNOSIS — R53.1 WEAKNESS: ICD-10-CM

## 2018-09-13 DIAGNOSIS — I10 ESSENTIAL HYPERTENSION: ICD-10-CM

## 2018-09-13 DIAGNOSIS — R06.02 SHORTNESS OF BREATH: Primary | ICD-10-CM

## 2018-09-13 DIAGNOSIS — R06.02 SHORTNESS OF BREATH: ICD-10-CM

## 2018-09-13 DIAGNOSIS — I47.1 SUPRAVENTRICULAR TACHYCARDIA (HCC): ICD-10-CM

## 2018-09-13 DIAGNOSIS — R53.81 MALAISE AND FATIGUE: ICD-10-CM

## 2018-09-13 DIAGNOSIS — R53.83 MALAISE AND FATIGUE: ICD-10-CM

## 2018-09-13 DIAGNOSIS — E78.5 DYSLIPIDEMIA: ICD-10-CM

## 2018-09-13 DIAGNOSIS — I49.1 PAC (PREMATURE ATRIAL CONTRACTION): ICD-10-CM

## 2018-09-13 DIAGNOSIS — Z00.00 HEALTHCARE MAINTENANCE: ICD-10-CM

## 2018-09-13 DIAGNOSIS — R00.2 PALPITATIONS: ICD-10-CM

## 2018-09-13 DIAGNOSIS — R51.9 GENERALIZED HEADACHES: ICD-10-CM

## 2018-09-13 DIAGNOSIS — R42 LIGHTHEADEDNESS: ICD-10-CM

## 2018-09-13 PROCEDURE — 85379 FIBRIN DEGRADATION QUANT: CPT | Performed by: NURSE PRACTITIONER

## 2018-09-13 PROCEDURE — 84443 ASSAY THYROID STIM HORMONE: CPT | Performed by: NURSE PRACTITIONER

## 2018-09-13 PROCEDURE — 82652 VIT D 1 25-DIHYDROXY: CPT | Performed by: NURSE PRACTITIONER

## 2018-09-13 PROCEDURE — 36415 COLL VENOUS BLD VENIPUNCTURE: CPT

## 2018-09-13 PROCEDURE — 82553 CREATINE MB FRACTION: CPT | Performed by: NURSE PRACTITIONER

## 2018-09-13 PROCEDURE — 80053 COMPREHEN METABOLIC PANEL: CPT | Performed by: NURSE PRACTITIONER

## 2018-09-13 PROCEDURE — 99213 OFFICE O/P EST LOW 20 MIN: CPT | Performed by: NURSE PRACTITIONER

## 2018-09-13 PROCEDURE — 84484 ASSAY OF TROPONIN QUANT: CPT | Performed by: NURSE PRACTITIONER

## 2018-09-13 PROCEDURE — 82607 VITAMIN B-12: CPT | Performed by: NURSE PRACTITIONER

## 2018-09-13 PROCEDURE — 93000 ELECTROCARDIOGRAM COMPLETE: CPT | Performed by: NURSE PRACTITIONER

## 2018-09-13 NOTE — PATIENT INSTRUCTIONS
MyPlate from [employer].  The general, healthful diet is based on the 2010 Dietary Guidelines for Americans. The amount of food you need to eat from each food group depends on your age, sex, and level of physical activity and can be individualized by a dietitian. Go to ChooseMyPlate.gov for more information.  What do I need to know about the MyPlate plan?  · Enjoy your food, but eat less.  · Avoid oversized portions.  ? ½ of your plate should include fruits and vegetables.  ? ¼ of your plate should be grains.  ? ¼ of your plate should be protein.  Grains  · Make at least half of your grains whole grains.  · For a 2,000 calorie daily food plan, eat 6 oz every day.  · 1 oz is about 1 slice bread, 1 cup cereal, or ½ cup cooked rice, cereal, or pasta.  Vegetables  · Make half your plate fruits and vegetables.  · For a 2,000 calorie daily food plan, eat 2½ cups every day.  · 1 cup is about 1 cup raw or cooked vegetables or vegetable juice or 2 cups raw leafy greens.  Fruits  · Make half your plate fruits and vegetables.  · For a 2,000 calorie daily food plan, eat 2 cups every day.  · 1 cup is about 1 cup fruit or 100% fruit juice or ½ cup dried fruit.  Protein  · For a 2,000 calorie daily food plan, eat 5½ oz every day.  · 1 oz is about 1 oz meat, poultry, or fish, ¼ cup cooked beans, 1 egg, 1 Tbsp peanut butter, or ½ oz nuts or seeds.  Dairy  · Switch to fat-free or low-fat (1%) milk.  · For a 2,000 calorie daily food plan, eat 3 cups every day.  · 1 cup is about 1 cup milk or yogurt or soy milk (soy beverage), 1½ oz natural cheese, or 2 oz processed cheese.  Fats, Oils, and Empty Calories  · Only small amounts of oils are recommended.  · Empty calories are calories from solid fats or added sugars.  · Compare sodium in foods like soup, bread, and frozen meals. Choose the foods with lower numbers.  · Drink water instead of sugary drinks.  What foods can I eat?  Grains  Whole grains such as whole wheat, quinoa, millet, and  bulgur. Bread, rolls, and pasta made from whole grains. Brown or wild rice. Hot or cold cereals made from whole grains and without added sugar.  Vegetables  All fresh vegetables, especially fresh red, dark green, or orange vegetables. Peas and beans. Low-sodium frozen or canned vegetables prepared without added salt. Low-sodium vegetable juices.  Fruits  All fresh, frozen, and dried fruits. Canned fruit packed in water or fruit juice without added sugar. Fruit juices without added sugar.  Meats and Other Protein Sources  Boiled, baked, or grilled lean meat trimmed of fat. Skinless poultry. Fresh seafood and shellfish. Canned seafood packed in water. Unsalted nuts and unsalted nut butters. Tofu. Dried beans and pea. Eggs.  Dairy  Low-fat or fat-free milk, yogurt, and cheeses.  Sweets and Desserts  Frozen desserts made from low-fat milk.  Fats and Oils  Olive, peanut, and canola oils and margarine. Salad dressing and mayonnaise made from these oils.  Other  Soups and casseroles made from allowed ingredients and without added fat or salt.  The items listed above may not be a complete list of recommended foods or beverages. Contact your dietitian for more options.  What foods are not recommended?  Grains  Sweetened, low-fiber cereals. Packaged baked goods. Snack crackers and chips. Cheese crackers, butter crackers, and biscuits. Frozen waffles, sweet breads, doughnuts, pastries, packaged baking mixes, pancakes, cakes, and cookies.  Vegetables  Regular canned or frozen vegetables or vegetables prepared with salt. Canned tomatoes. Canned tomato sauce. Fried vegetables. Vegetables in cream sauce or cheese sauce.  Fruits  Fruits packed in syrup or made with added sugar.  Meats and Other Protein Sources  Marbled or fatty meats such as ribs. Poultry with skin. Fried meats, poultry, eggs, or fish. Sausages, hot dogs, and deli meats such as pastrami, bologna, or salami.  Dairy  Whole milk, cream, cheeses made from whole milk,  sour cream. Ice cream or yogurt made from whole milk or with added sugar.  Beverages  For adults, no more than one alcoholic drink per day. Regular soft drinks or other sugary beverages. Juice drinks.  Sweets and Desserts  Sugary or fatty desserts, candy, and other sweets.  Fats and Oils  Solid shortening or partially hydrogenated oils. Solid margarine. Margarine that contains trans fats. Butter.  The items listed above may not be a complete list of foods and beverages to avoid. Contact your dietitian for more information.  This information is not intended to replace advice given to you by your health care provider. Make sure you discuss any questions you have with your health care provider.  Document Released: 01/06/2009 Document Revised: 05/25/2017 Document Reviewed: 11/26/2014  [employer] Interactive Patient Education © 2018 [employer] Inc.

## 2018-09-13 NOTE — PROGRESS NOTES
Subjective   Justina Ch is a 63 y.o. female     Chief Complaint   Patient presents with   • Rapid Heart Rate   • Hypertension   • Hyperlipidemia   • Shortness of Breath       HPI    Problem list:    1. SVT - flecainide therapy  1.2 Event Monitor 5/7-5/20-16 - NSR with PACs and PVCs  2. Bradycardia  3. Left atrial tachycardia and right atrial isthmus-dependent flutter   3.1 RFA 10/14/2014  4. Grave’s disease, status post ablation with hypothyroidism.   5. Dyslipidemia.   6. Hypertension  6.1 Stress test 7/23/14 - low risk, no ischemia   6.2 Stress Test 7/12/17-no ischemia, low risk  6.3 Echo 7/21/14 - EF 55-60%; DD II; trace MR, TR  6.4 Echo 7/12/17-EF greater than 65%, diastolic dysfunction 1, trace TR, trace MT, PA 20-25  7. Cardiomegaly  7.1 CT Chest 8/5/15 - Cardiomegaly; suspected hepatosplenomegaly with fatty liver    Patient is a 63-year-old female who presents today for follow-up.  She denies any chest pain, pressure, syncope, orthopnea or PND.  Patient states that her palpitations are much better she only has the maybe once in a while.  She says she gets some swelling but not very often.  She says that she's been having these episodes where she will fill off balance.  She says when she does that she will then becomes short of breath and fill like she's going to pass out.  She says that she is very fatigued after that the episodes occur and she will have a little headache.  She says the headache is right above her temples.  She says it can occur any time there is no rhyme or reason.  She says she's been having in them all summer and that her last one was less than a week ago.  I did EKG today to check her QTQTC and they were within normal limits.  Advise patient it's possible she is having hypoglycemic episodes and a follow-up with PCP regarding this as well.  I did encourage her to try to eat something smaller every 2 hours.      Current Outpatient Prescriptions   Medication Sig Dispense Refill   •  aspirin 81 MG tablet Take 1 tablet by mouth daily.     • cetirizine (zyrTEC) 10 MG tablet TAKE 1 TABLET BY MOUTH DAILY 30 tablet 5   • flecainide (TAMBOCOR) 150 MG tablet Take 0.5 tablets by mouth 2 (Two) Times a Day. 90 tablet 3   • levothyroxine (SYNTHROID, LEVOTHROID) 137 MCG tablet TAKE 1 TABLET BY MOUTH DAILY 90 tablet 0   • lisinopril (PRINIVIL,ZESTRIL) 2.5 MG tablet TAKE 1 TABLET BY MOUTH EVERY DAY 30 tablet 5   • nitroglycerin (NITROSTAT) 0.4 MG SL tablet 1 under the tongue as needed for angina, may repeat q5mins for up three doses 30 tablet 3     No current facility-administered medications for this visit.        ALLERGIES    Patient has no known allergies.    Past Medical History:   Diagnosis Date   • Arrhythmia    • Enlarged heart    • Graves' disease    • Hx of mammogram 09/2016 9/16   • Hyperlipidemia    • Hypertension 7/27/2016       Social History     Social History   • Marital status:      Spouse name: N/A   • Number of children: N/A   • Years of education: N/A     Occupational History   • Not on file.     Social History Main Topics   • Smoking status: Former Smoker     Quit date: 5/4/2001   • Smokeless tobacco: Never Used   • Alcohol use No   • Drug use: No   • Sexual activity: Defer     Other Topics Concern   • Not on file     Social History Narrative   • No narrative on file       Family History   Problem Relation Age of Onset   • Hypertension Mother    • Breast cancer Mother    • Bone cancer Mother    • Heart attack Father    • Heart disease Father         pacemaker   • Hyperlipidemia Father    • Hypertension Father    • Breast cancer Sister    • Ovarian cancer Sister    • Diabetes Paternal Grandmother        Review of Systems   Constitutional: Positive for fatigue (after episodes ). Negative for diaphoresis.   HENT: Negative for rhinorrhea and sneezing.    Eyes: Positive for visual disturbance (wears glasses ).   Respiratory: Positive for shortness of breath (after off balance; 6 times  "since this summer; also with any activity here lately ). Negative for chest tightness.    Cardiovascular: Positive for palpitations ( much better with meds, just once in a while ) and leg swelling. Negative for chest pain.   Gastrointestinal: Negative for constipation, diarrhea, nausea and vomiting.   Endocrine: Negative.    Genitourinary: Negative for difficulty urinating.   Musculoskeletal: Positive for gait problem (balance off; six times since this summer; lasts couple of min from beginning to end, shaky after and then she will cry ). Negative for arthralgias, back pain and neck pain.   Skin: Negative.    Allergic/Immunologic: Negative.    Neurological: Positive for light-headedness (feels like she is going to pass out, but does not.  She will sit when this happens; six times since summer ) and headaches (after episodes, not bad but hurts above temple; not always with episodes ). Negative for syncope.   Hematological: Bruises/bleeds easily.   Psychiatric/Behavioral: The patient is nervous/anxious.        Objective   /79 (BP Location: Left arm, Patient Position: Sitting)   Pulse 67   Resp 20   Ht 167.6 cm (66\")   Wt 85.1 kg (187 lb 9.6 oz)   SpO2 96%   BMI 30.28 kg/m²   Vitals:    09/13/18 1054   BP: 137/79   BP Location: Left arm   Patient Position: Sitting   Pulse: 67   Resp: 20   SpO2: 96%   Weight: 85.1 kg (187 lb 9.6 oz)   Height: 167.6 cm (66\")      Lab Results (most recent)     None        Physical Exam   Constitutional: She is oriented to person, place, and time. Vital signs are normal. She appears well-developed and well-nourished. She is active and cooperative.   HENT:   Head: Normocephalic.   Eyes: Lids are normal.   Wears glasses    Neck: Normal carotid pulses, no hepatojugular reflux and no JVD present. Carotid bruit is not present.   Cardiovascular: Normal rate, regular rhythm and normal heart sounds.    Pulses:       Radial pulses are 2+ on the right side, and 2+ on the left side.       "  Dorsalis pedis pulses are 2+ on the right side, and 2+ on the left side.        Posterior tibial pulses are 2+ on the right side, and 2+ on the left side.   No edema BLE.    Pulmonary/Chest: Effort normal and breath sounds normal.   Abdominal: Normal appearance and bowel sounds are normal.   Neurological: She is alert and oriented to person, place, and time.   Skin: Skin is warm, dry and intact.   Psychiatric: She has a normal mood and affect. Her speech is normal and behavior is normal. Judgment and thought content normal. Cognition and memory are normal.       Procedure     ECG 12 Lead  Date/Time: 9/13/2018 12:29 PM  Performed by: ИВАН KEARNS  Authorized by: ИВАН KEARNS   Comparison: compared with previous ECG from 3/14/2018  Rhythm: sinus rhythm  Rate: normal  BPM: 65  T wave flattening noted on lead: nonspecific changes   QRS axis: normal  Clinical impression: non-specific ECG and low voltage  Comments: QT/QTc 401/413                 Assessment/Plan      Diagnosis Plan   1. Shortness of breath  D-dimer, Quantitative    Troponin    CK-MB    Stress Test With Myocardial Perfusion One Day    Adult Transthoracic Echo Complete W/ Cont if Necessary Per Protocol    Stress Test With Myocardial Perfusion One Day    Adult Transthoracic Echo Complete W/ Cont if Necessary Per Protocol   2. Essential hypertension  Comprehensive Metabolic Panel    Stress Test With Myocardial Perfusion One Day    Adult Transthoracic Echo Complete W/ Cont if Necessary Per Protocol    Stress Test With Myocardial Perfusion One Day    Adult Transthoracic Echo Complete W/ Cont if Necessary Per Protocol   3. PAC (premature atrial contraction)  TSH    Stress Test With Myocardial Perfusion One Day    Adult Transthoracic Echo Complete W/ Cont if Necessary Per Protocol    Stress Test With Myocardial Perfusion One Day    Adult Transthoracic Echo Complete W/ Cont if Necessary Per Protocol   4. Palpitations  TSH    Stress Test With Myocardial  Perfusion One Day    Adult Transthoracic Echo Complete W/ Cont if Necessary Per Protocol    Stress Test With Myocardial Perfusion One Day    Adult Transthoracic Echo Complete W/ Cont if Necessary Per Protocol   5. Supraventricular tachycardia (CMS/HCC)  TSH    Stress Test With Myocardial Perfusion One Day    Adult Transthoracic Echo Complete W/ Cont if Necessary Per Protocol    Stress Test With Myocardial Perfusion One Day    Adult Transthoracic Echo Complete W/ Cont if Necessary Per Protocol   6. Dyslipidemia  Stress Test With Myocardial Perfusion One Day    Adult Transthoracic Echo Complete W/ Cont if Necessary Per Protocol    Stress Test With Myocardial Perfusion One Day    Adult Transthoracic Echo Complete W/ Cont if Necessary Per Protocol   7. Healthcare maintenance  TSH    Vitamin B12    Vitamin D 1,25 Dihydroxy    Comprehensive Metabolic Panel    Stress Test With Myocardial Perfusion One Day    Adult Transthoracic Echo Complete W/ Cont if Necessary Per Protocol    Stress Test With Myocardial Perfusion One Day    Adult Transthoracic Echo Complete W/ Cont if Necessary Per Protocol   8. Malaise and fatigue  Vitamin B12    Vitamin D 1,25 Dihydroxy    Stress Test With Myocardial Perfusion One Day    Adult Transthoracic Echo Complete W/ Cont if Necessary Per Protocol    Stress Test With Myocardial Perfusion One Day    Adult Transthoracic Echo Complete W/ Cont if Necessary Per Protocol   9. Lightheadedness  US Carotid Bilateral    Stress Test With Myocardial Perfusion One Day    Adult Transthoracic Echo Complete W/ Cont if Necessary Per Protocol    Stress Test With Myocardial Perfusion One Day    Adult Transthoracic Echo Complete W/ Cont if Necessary Per Protocol    US Carotid Bilateral   10. Weakness  US Carotid Bilateral    Stress Test With Myocardial Perfusion One Day    Adult Transthoracic Echo Complete W/ Cont if Necessary Per Protocol    Stress Test With Myocardial Perfusion One Day    Adult Transthoracic Echo  Complete W/ Cont if Necessary Per Protocol    US Carotid Bilateral   11. Generalized headaches  US Carotid Bilateral    Stress Test With Myocardial Perfusion One Day    Adult Transthoracic Echo Complete W/ Cont if Necessary Per Protocol    Stress Test With Myocardial Perfusion One Day    Adult Transthoracic Echo Complete W/ Cont if Necessary Per Protocol    US Carotid Bilateral       Return in about 9 weeks (around 11/15/2018).       shortness of breath/hypertension/PAC/SVT/dyslipidemia/fatigue/lightheadedness/weakness/headaches-patient will have ischemia workup, stress and echo.  She will also have a carotid artery ultrasound.  She will get blood work today to include d-dimer, troponin, CK-MB, TSH, vitamin B12 and vitamin D.  She will continue her medication for now.  As previously indicated her QT and QTC were within normal limits.  Patient has a follow-up with her PCP next week and I recommended she Discussed Having a Glucose Tolerance Test with Her PCP.  We will for the last her PCP.  She will follow-up in 9 weeks or sooner if any changes.      Patient's Body mass index is 30.28 kg/m². BMI is above normal parameters. Recommendations include: educational material.      Electronically signed by:

## 2018-09-14 ENCOUNTER — TELEPHONE (OUTPATIENT)
Dept: CARDIOLOGY | Facility: CLINIC | Age: 63
End: 2018-09-14

## 2018-09-14 LAB
ALBUMIN SERPL-MCNC: 4.4 G/DL (ref 3.4–4.8)
ALBUMIN/GLOB SERPL: 1.6 G/DL (ref 1.5–2.5)
ALP SERPL-CCNC: 178 U/L (ref 35–104)
ALT SERPL W P-5'-P-CCNC: 45 U/L (ref 10–36)
ANION GAP SERPL CALCULATED.3IONS-SCNC: 4.3 MMOL/L (ref 3.6–11.2)
AST SERPL-CCNC: 35 U/L (ref 10–30)
BILIRUB SERPL-MCNC: 0.5 MG/DL (ref 0.2–1.8)
BUN BLD-MCNC: 12 MG/DL (ref 7–21)
BUN/CREAT SERPL: 15.4 (ref 7–25)
CALCIUM SPEC-SCNC: 9.4 MG/DL (ref 7.7–10)
CHLORIDE SERPL-SCNC: 106 MMOL/L (ref 99–112)
CK MB SERPL-CCNC: 7.35 NG/ML (ref 0–5)
CO2 SERPL-SCNC: 28.7 MMOL/L (ref 24.3–31.9)
CREAT BLD-MCNC: 0.78 MG/DL (ref 0.43–1.29)
D DIMER PPP FEU-MCNC: 0.33 MCGFEU/ML (ref 0–0.5)
GFR SERPL CREATININE-BSD FRML MDRD: 75 ML/MIN/1.73
GLOBULIN UR ELPH-MCNC: 2.7 GM/DL
GLUCOSE BLD-MCNC: 97 MG/DL (ref 70–110)
OSMOLALITY SERPL CALC.SUM OF ELEC: 277.2 MOSM/KG (ref 273–305)
POTASSIUM BLD-SCNC: 4 MMOL/L (ref 3.5–5.3)
PROT SERPL-MCNC: 7.1 G/DL (ref 6–8)
SODIUM BLD-SCNC: 139 MMOL/L (ref 135–153)
TROPONIN I SERPL-MCNC: <0.006 NG/ML
TSH SERPL DL<=0.05 MIU/L-ACNC: 2.46 MIU/ML (ref 0.55–4.78)
VIT B12 BLD-MCNC: 478 PG/ML (ref 211–911)

## 2018-09-14 NOTE — TELEPHONE ENCOUNTER
Received call from Jeanette with  Labs stating critical CKMB level at 7.35. No new orders per Mary GARCIA as Troponin was <0.006. ANYI Fischer

## 2018-09-16 DIAGNOSIS — I10 ESSENTIAL HYPERTENSION: ICD-10-CM

## 2018-09-17 RX ORDER — LISINOPRIL 2.5 MG/1
TABLET ORAL
Qty: 90 TABLET | Refills: 5 | Status: SHIPPED | OUTPATIENT
Start: 2018-09-17 | End: 2019-04-16 | Stop reason: SDUPTHER

## 2018-09-18 ENCOUNTER — TELEPHONE (OUTPATIENT)
Dept: CARDIOLOGY | Facility: CLINIC | Age: 63
End: 2018-09-18

## 2018-09-18 LAB — 1,25(OH)2D3 SERPL-MCNC: 53.5 PG/ML (ref 19.9–79.3)

## 2018-09-18 NOTE — TELEPHONE ENCOUNTER
Advised patient of normal labs and encouraged her to keep all scheduled appointments.  TLMcCLendon CLEMENTE      ----- Message from JOSE Michael sent at 9/18/2018  1:23 PM EDT -----  Please advise patient.  THanks!

## 2018-09-20 ENCOUNTER — OFFICE VISIT (OUTPATIENT)
Dept: FAMILY MEDICINE CLINIC | Facility: CLINIC | Age: 63
End: 2018-09-20

## 2018-09-20 VITALS
HEART RATE: 76 BPM | SYSTOLIC BLOOD PRESSURE: 112 MMHG | RESPIRATION RATE: 12 BRPM | BODY MASS INDEX: 30.22 KG/M2 | HEIGHT: 66 IN | OXYGEN SATURATION: 98 % | WEIGHT: 188 LBS | DIASTOLIC BLOOD PRESSURE: 70 MMHG | TEMPERATURE: 97.7 F

## 2018-09-20 DIAGNOSIS — E03.9 ACQUIRED HYPOTHYROIDISM: ICD-10-CM

## 2018-09-20 DIAGNOSIS — R55 PRE-SYNCOPE: ICD-10-CM

## 2018-09-20 DIAGNOSIS — E78.5 DYSLIPIDEMIA: ICD-10-CM

## 2018-09-20 DIAGNOSIS — R26.9 ABNORMAL GAIT: ICD-10-CM

## 2018-09-20 DIAGNOSIS — Z80.3 FAMILY HISTORY OF BREAST CANCER: ICD-10-CM

## 2018-09-20 DIAGNOSIS — R06.02 SHORTNESS OF BREATH: ICD-10-CM

## 2018-09-20 DIAGNOSIS — I47.1 ATRIAL TACHYCARDIA (HCC): ICD-10-CM

## 2018-09-20 DIAGNOSIS — Z12.31 ENCOUNTER FOR SCREENING MAMMOGRAM FOR BREAST CANCER: ICD-10-CM

## 2018-09-20 DIAGNOSIS — I10 ESSENTIAL HYPERTENSION: ICD-10-CM

## 2018-09-20 DIAGNOSIS — Z00.00 ANNUAL PHYSICAL EXAM: Primary | ICD-10-CM

## 2018-09-20 DIAGNOSIS — I47.1 SUPRAVENTRICULAR TACHYCARDIA (HCC): ICD-10-CM

## 2018-09-20 DIAGNOSIS — R74.8 ELEVATED LIVER ENZYMES: ICD-10-CM

## 2018-09-20 LAB
BILIRUB BLD-MCNC: NEGATIVE MG/DL
CLARITY, POC: CLEAR
COLOR UR: YELLOW
GLUCOSE UR STRIP-MCNC: NEGATIVE MG/DL
KETONES UR QL: NEGATIVE
LEUKOCYTE EST, POC: NEGATIVE
NITRITE UR-MCNC: NEGATIVE MG/ML
PH UR: 5 [PH] (ref 5–8)
PROT UR STRIP-MCNC: NEGATIVE MG/DL
RBC # UR STRIP: ABNORMAL /UL
SP GR UR: 1.01 (ref 1–1.03)
UROBILINOGEN UR QL: NORMAL

## 2018-09-20 PROCEDURE — 99214 OFFICE O/P EST MOD 30 MIN: CPT | Performed by: NURSE PRACTITIONER

## 2018-09-20 PROCEDURE — 81003 URINALYSIS AUTO W/O SCOPE: CPT | Performed by: NURSE PRACTITIONER

## 2018-09-20 NOTE — PROGRESS NOTES
Akash Ch is a 63 y.o. female.     Patient is here today for follow up on her chronic conditions and have her annual physical.    Essential hypertension  She is taking her Lisinopril as directed and is tolerating it well. She is having no adverse side effects.    Dyslipidemia  She had been eating healthier, eating less fatty and greasy foods, but this summer, she did not do well. 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    Acquired hypothyroidism  She takes her Levothyroxine as directed every morning. She feels her dose is correct, and is having no adverse effects.     She has acute complaints today of having some pre-syncopal episodes, with shortness of breath. A few weeks ago, she was just walking in he house, and started feeling like she couldn't walk straight. She all of the sudden got short of breath, and then felt like she was going to pass out. She sat down, and then in a few seconds, it went away. A week later, she had an exact episode on 9/11, so she called her Cardiologist and they saw her on 9/13/18. They checked several labs, including cardiac enzymes, and told her they were all normal. Her EKG was normal in the office that day. Her SVT, atrial tachycardia and atrial flutter, were all controlled and doing well with Tambacor. She is scheduled for an ECHO on 10/2/18. They told her to talk with her PCP about hypoglycemic episodes.     Discussed with patient that her Cardiologist did not check her cholesterol, so she will need to RTC fasting for that, since her was elevated at last check. I also advised her that her liver enzymes were slightly elevated on her labs a well, and educated her on the possible causes.     Her Pap is not up to date, so she will schedule that with GYN. She needs an order for her Mammogram. She gets one every  year, because her sister  of breast cancer. Her colonoscopy and vaccines are up to date.                     The following portions of the patient's history were reviewed and updated as appropriate: allergies, current medications, past family history, past medical history, past social history, past surgical history and problem list.    Review of Systems   Constitutional: Negative.    HENT: Negative.    Eyes: Negative.    Respiratory: Positive for shortness of breath. Negative for apnea, cough, choking, chest tightness, wheezing and stridor.    Cardiovascular: Negative.    Gastrointestinal: Negative.    Genitourinary: Negative.    Musculoskeletal: Negative.    Skin: Negative.    Allergic/Immunologic: Negative.    Neurological: Positive for syncope (pre syncope) and light-headedness. Negative for dizziness, weakness and numbness.   Hematological: Negative for adenopathy.   Psychiatric/Behavioral: Negative for confusion and suicidal ideas. The patient is not nervous/anxious.      Vitals:    18 1349   BP: 112/70   Pulse: 76   Resp: 12   Temp: 97.7 °F (36.5 °C)   SpO2: 98%     Objective   Physical Exam   Constitutional: She is oriented to person, place, and time. She appears well-developed and well-nourished. No distress.   HENT:   Head: Normocephalic.   Right Ear: External ear normal.   Left Ear: External ear normal.   Nose: Nose normal.   Mouth/Throat: Oropharynx is clear and moist. No oropharyngeal exudate.   Eyes: Pupils are equal, round, and reactive to light. Conjunctivae are normal.   Neck: Normal range of motion. Neck supple. No tracheal deviation present. No thyromegaly present.   Cardiovascular: Normal rate, regular rhythm, normal heart sounds and intact distal pulses.    No murmur heard.  Pulmonary/Chest: Effort normal and breath sounds normal. No respiratory distress. She has no wheezes. She has no rales. She exhibits no tenderness.   Abdominal: Soft. Bowel sounds are normal. She exhibits no  distension and no mass. There is no hepatosplenomegaly or splenomegaly. There is no tenderness. There is no rebound and no guarding. No hernia.   Musculoskeletal: Normal range of motion. She exhibits no edema or tenderness.   Lymphadenopathy:     She has no cervical adenopathy.        Right cervical: No superficial cervical, no deep cervical and no posterior cervical adenopathy present.       Left cervical: No superficial cervical, no deep cervical and no posterior cervical adenopathy present.   Neurological: She is alert and oriented to person, place, and time. Coordination and gait normal.   Skin: Skin is warm and dry. No rash noted.   Psychiatric: She has a normal mood and affect. Her behavior is normal. Judgment and thought content normal.       Assessment/Plan   Justina was seen today for hypertension and hypothyroidism.    Diagnoses and all orders for this visit:    Annual physical exam    Essential hypertension    Dyslipidemia  -     Lipid Panel; Future    Acquired hypothyroidism    Pre-syncope  -     POCT urinalysis dipstick, automated    Shortness of breath    Abnormal gait  -     POCT urinalysis dipstick, automated    Supraventricular tachycardia (CMS/HCC)    Left atrial tachycardia and right atrial isthmus-dependent flutter     Elevated liver enzymes    Encounter for screening mammogram for breast cancer  -     Mammo screening digital tomosynthesis bilateral w CAD; Future    Family history of breast cancer  -     Mammo screening digital tomosynthesis bilateral w CAD; Future      Annual Physical completed today in clinic.     Patient to continue her medications as directed.     Patient advised to follow up with Cardiology as directed, for her pre syncopal and shortness of breath episodes. Advised patient that her blood sugars are always normal in her labs, with no signs of hypoglycemia. She was also educated that her symptoms are not those of typical hypoglycemia. Typically, people do not feel short of  breath, and like they are going to pass out, and the symptoms improve with just sitting down. With hypoglycemia, you have to eat or drink something, to raise the blood sugar, to make the symptoms resolve. They do not just get better in a few seconds.     Fasting lipid panel ordered, patient to RTC for this. I will contact patient regarding test results and provide instructions regarding any necessary changes in plan of care.    Patient feels her liver enzymes are elevated, related to her increased intake of fatty foods over the summer, and her 10 pound weight gain since her last visit. She does not want any screening labs or an US, performed at this time.     Nutrition and activity goals reviewed including: mainly water to drink, limit white flour/processed sugar, high protein, high fiber carbs, good breakfast, working toward 150 mins cardio per week, resistance training 2x/week.     Screening mammogram ordered today. I will contact patient regarding test results and provide instructions regarding any necessary changes in plan of care.    Patient was encouraged to keep me informed of any acute changes, lack of improvement, or any new concerning symptoms. I advised patient that a CT scan would be ordered, if her symptoms worsen.  I also advised her to go to the ED, if her shortness of breath worsens, or she has an actual syncopal episodes.  Patient voiced understanding of all instructions and denied further questions.    Patient to RTC following her appointment with Cardiology, or sooner if needed.

## 2018-09-25 ENCOUNTER — RESULTS ENCOUNTER (OUTPATIENT)
Dept: FAMILY MEDICINE CLINIC | Facility: CLINIC | Age: 63
End: 2018-09-25

## 2018-09-25 DIAGNOSIS — E78.5 DYSLIPIDEMIA: ICD-10-CM

## 2018-10-02 ENCOUNTER — TELEPHONE (OUTPATIENT)
Dept: CARDIOLOGY | Facility: CLINIC | Age: 63
End: 2018-10-02

## 2018-10-02 ENCOUNTER — HOSPITAL ENCOUNTER (OUTPATIENT)
Dept: CARDIOLOGY | Facility: HOSPITAL | Age: 63
Discharge: HOME | End: 2018-10-02
Admitting: NURSE PRACTITIONER

## 2018-10-02 ENCOUNTER — HOSPITAL ENCOUNTER (OUTPATIENT)
Dept: CARDIOLOGY | Facility: HOSPITAL | Age: 63
Discharge: HOME | End: 2018-10-02

## 2018-10-02 DIAGNOSIS — Z00.00 HEALTHCARE MAINTENANCE: Primary | ICD-10-CM

## 2018-10-02 DIAGNOSIS — E78.5 DYSLIPIDEMIA: ICD-10-CM

## 2018-10-02 LAB
BH CV ECHO MEAS - % IVS THICK: 14 %
BH CV ECHO MEAS - % LVPW THICK: 32.7 %
BH CV ECHO MEAS - ACS: 2.1 CM
BH CV ECHO MEAS - AO MAX PG: 7.7 MMHG
BH CV ECHO MEAS - AO MEAN PG: 3.2 MMHG
BH CV ECHO MEAS - AO ROOT AREA (BSA CORRECTED): 1.6
BH CV ECHO MEAS - AO ROOT AREA: 7.6 CM^2
BH CV ECHO MEAS - AO ROOT DIAM: 3.1 CM
BH CV ECHO MEAS - AO V2 MAX: 138.5 CM/SEC
BH CV ECHO MEAS - AO V2 MEAN: 82.6 CM/SEC
BH CV ECHO MEAS - AO V2 VTI: 33.7 CM
BH CV ECHO MEAS - BSA(HAYCOCK): 2 M^2
BH CV ECHO MEAS - BSA: 1.9 M^2
BH CV ECHO MEAS - BZI_BMI: 30.3 KILOGRAMS/M^2
BH CV ECHO MEAS - BZI_METRIC_HEIGHT: 167.6 CM
BH CV ECHO MEAS - BZI_METRIC_WEIGHT: 85.3 KG
BH CV ECHO MEAS - EDV(CUBED): 104.4 ML
BH CV ECHO MEAS - EDV(MOD-SP4): 28 ML
BH CV ECHO MEAS - EDV(TEICH): 102.8 ML
BH CV ECHO MEAS - EF(CUBED): 79.8 %
BH CV ECHO MEAS - EF(MOD-BP): 64 %
BH CV ECHO MEAS - EF(MOD-SP4): 64.3 %
BH CV ECHO MEAS - EF(TEICH): 72.2 %
BH CV ECHO MEAS - ESV(CUBED): 21.1 ML
BH CV ECHO MEAS - ESV(MOD-SP4): 10 ML
BH CV ECHO MEAS - ESV(TEICH): 28.6 ML
BH CV ECHO MEAS - FS: 41.3 %
BH CV ECHO MEAS - IVS/LVPW: 1
BH CV ECHO MEAS - IVSD: 0.99 CM
BH CV ECHO MEAS - IVSS: 1.1 CM
BH CV ECHO MEAS - LA DIMENSION: 4 CM
BH CV ECHO MEAS - LA/AO: 1.3
BH CV ECHO MEAS - LV DIASTOLIC VOL/BSA (35-75): 14.4 ML/M^2
BH CV ECHO MEAS - LV MASS(C)D: 160.8 GRAMS
BH CV ECHO MEAS - LV MASS(C)DI: 82.6 GRAMS/M^2
BH CV ECHO MEAS - LV MASS(C)S: 98.9 GRAMS
BH CV ECHO MEAS - LV MASS(C)SI: 50.8 GRAMS/M^2
BH CV ECHO MEAS - LV SYSTOLIC VOL/BSA (12-30): 5.1 ML/M^2
BH CV ECHO MEAS - LVIDD: 4.7 CM
BH CV ECHO MEAS - LVIDS: 2.8 CM
BH CV ECHO MEAS - LVLD AP4: 5 CM
BH CV ECHO MEAS - LVLS AP4: 4.9 CM
BH CV ECHO MEAS - LVOT AREA (M): 3.5 CM^2
BH CV ECHO MEAS - LVOT AREA: 3.6 CM^2
BH CV ECHO MEAS - LVOT DIAM: 2.1 CM
BH CV ECHO MEAS - LVPWD: 0.97 CM
BH CV ECHO MEAS - LVPWS: 1.3 CM
BH CV ECHO MEAS - MV A MAX VEL: 56.7 CM/SEC
BH CV ECHO MEAS - MV E MAX VEL: 75.2 CM/SEC
BH CV ECHO MEAS - MV E/A: 1.3
BH CV ECHO MEAS - PA ACC SLOPE: 922.9 CM/SEC^2
BH CV ECHO MEAS - PA ACC TIME: 0.12 SEC
BH CV ECHO MEAS - PA PR(ACCEL): 26.7 MMHG
BH CV ECHO MEAS - RAP SYSTOLE: 10 MMHG
BH CV ECHO MEAS - RVDD: 2.1 CM
BH CV ECHO MEAS - RVSP: 33.1 MMHG
BH CV ECHO MEAS - SI(AO): 131.9 ML/M^2
BH CV ECHO MEAS - SI(CUBED): 42.8 ML/M^2
BH CV ECHO MEAS - SI(MOD-SP4): 9.2 ML/M^2
BH CV ECHO MEAS - SI(TEICH): 38.1 ML/M^2
BH CV ECHO MEAS - SV(AO): 256.9 ML
BH CV ECHO MEAS - SV(CUBED): 83.3 ML
BH CV ECHO MEAS - SV(MOD-SP4): 18 ML
BH CV ECHO MEAS - SV(TEICH): 74.2 ML
BH CV ECHO MEAS - TR MAX VEL: 240.3 CM/SEC
LV EF 2D ECHO EST: 60 %

## 2018-10-02 PROCEDURE — 93880 EXTRACRANIAL BILAT STUDY: CPT | Performed by: RADIOLOGY

## 2018-10-02 PROCEDURE — 93306 TTE W/DOPPLER COMPLETE: CPT | Performed by: INTERNAL MEDICINE

## 2018-10-02 PROCEDURE — 93306 TTE W/DOPPLER COMPLETE: CPT

## 2018-10-02 PROCEDURE — 93880 EXTRACRANIAL BILAT STUDY: CPT

## 2018-10-02 NOTE — TELEPHONE ENCOUNTER
Informed pt of her US CAROTID BILATERAL results, that there was no significant plaquing. Pt is already on aspirin. Asked if she was interested in starting a statin, pt stated that she would try one. I informed her that Mary is out of the office right now, but that she will send in a statin tomorrow for her, confirmed that correct px is listed in EPIC.      Also informed pt of her echo results:  · Left ventricular systolic function is normal. Estimated EF = 60%.  · Left atrial cavity size is borderline dilated.      Stated to keep her upcoming stress test appt and her F/U w/ Mary on 11/13/18. Pt verbalized understanding.

## 2018-10-02 NOTE — TELEPHONE ENCOUNTER
----- Message from JOSE Michael sent at 10/2/2018  2:26 PM EDT -----  Please advise patient, no sig plaque, ASA and statin treatment only.  Does she want to try a statin?

## 2018-10-03 RX ORDER — PRAVASTATIN SODIUM 20 MG
20 TABLET ORAL DAILY
Qty: 30 TABLET | Refills: 3 | Status: SHIPPED | OUTPATIENT
Start: 2018-10-03 | End: 2018-12-27

## 2018-10-03 NOTE — TELEPHONE ENCOUNTER
Per Mary, sent Pravastatin 20mg in to pt's preferred px. Wants repeat Lipid and CMP in 6 weeks.     Informed pt that rx was sent to her px and that labs are needed in 6 weeks, pt verbalized understanding. Stated she would get labs when she comes in for F/U appt on 11/13/18.

## 2018-10-11 DIAGNOSIS — I10 ESSENTIAL HYPERTENSION: ICD-10-CM

## 2018-10-11 RX ORDER — LISINOPRIL 2.5 MG/1
TABLET ORAL
Qty: 30 TABLET | Refills: 0 | OUTPATIENT
Start: 2018-10-11

## 2018-10-24 DIAGNOSIS — E03.9 ACQUIRED HYPOTHYROIDISM: ICD-10-CM

## 2018-10-24 RX ORDER — LEVOTHYROXINE SODIUM 137 UG/1
137 TABLET ORAL DAILY
Qty: 90 TABLET | Refills: 0 | Status: SHIPPED | OUTPATIENT
Start: 2018-10-24 | End: 2019-01-17 | Stop reason: SDUPTHER

## 2018-11-13 ENCOUNTER — OFFICE VISIT (OUTPATIENT)
Dept: CARDIOLOGY | Facility: CLINIC | Age: 63
End: 2018-11-13

## 2018-11-13 VITALS
HEART RATE: 69 BPM | OXYGEN SATURATION: 97 % | HEIGHT: 66 IN | SYSTOLIC BLOOD PRESSURE: 117 MMHG | BODY MASS INDEX: 30.12 KG/M2 | WEIGHT: 187.4 LBS | DIASTOLIC BLOOD PRESSURE: 74 MMHG

## 2018-11-13 DIAGNOSIS — I47.1 SUPRAVENTRICULAR TACHYCARDIA (HCC): ICD-10-CM

## 2018-11-13 DIAGNOSIS — I10 ESSENTIAL HYPERTENSION: Primary | ICD-10-CM

## 2018-11-13 DIAGNOSIS — I49.1 PAC (PREMATURE ATRIAL CONTRACTION): ICD-10-CM

## 2018-11-13 DIAGNOSIS — E78.5 DYSLIPIDEMIA: ICD-10-CM

## 2018-11-13 DIAGNOSIS — R06.02 SHORTNESS OF BREATH: ICD-10-CM

## 2018-11-13 PROCEDURE — 99213 OFFICE O/P EST LOW 20 MIN: CPT | Performed by: NURSE PRACTITIONER

## 2018-11-13 NOTE — PROGRESS NOTES
Akash Ch is a 63 y.o. female     Chief Complaint   Patient presents with   • Follow-up     Pt in office to go over results: pt was unable to get stress done, needs to R/S that. Did echo and carotid.    • Shortness of Breath   • Hypertension       HPI    Problem list:    1. SVT - flecainide therapy  1.2 Event Monitor 5/7-5/20-16 - NSR with PACs and PVCs  2. Bradycardia  3. Left atrial tachycardia and right atrial isthmus-dependent flutter   3.1 RFA 10/14/2014  4. Grave’s disease, status post ablation with hypothyroidism.   5. Dyslipidemia.   6. Hypertension  6.1 Stress test 7/23/14 - low risk, no ischemia   6.2 Stress Test 7/12/17-no ischemia, low risk  6.3 Echo 7/21/14 - EF 55-60%; DD II; trace MR, TR  6.4 Echo 7/12/17-EF greater than 65%, diastolic dysfunction 1, trace TR, trace KS, PA 20-25  6.5 Echo 10/2/18 - EF 64%; borderline left atrial enlargement   7. Cardiomegaly  7.1 CT Chest 8/5/15 - Cardiomegaly; suspected hepatosplenomegaly with fatty liver  8. Carotid Artery Disease   8.1 Carotid Artery US 10/2/18 - mild atherosclerotic was noted involving the carotid systems; antegrade flow both vertebral arteries.    Patient is a 63-year-old female who presents today for follow-up.  She denies any chest pain or pressure.  She says she has fluttering daily and this has not changed.  She said she did have episodes where she will walk and she feels like she's walking sideways.  She feels like she's going to pass out she says and she will get short of breath when this happens.  She says she's not had any of these episodes in over 2 months.  She denies any actual BP.  She denies any orthopnea, PND or edema.  She says she does get short of breath with activity and again this has not changed.  She is taking care of her mother-in-law.  Her daughter that lives in [place] is getting ready to have IVF per second time.  Her first child is now 15 months.  Patient has an appointment with the EP.  She did not get  the stress test has to get rescheduled.    We went over her carotid and echo.    Current Outpatient Medications   Medication Sig Dispense Refill   • aspirin 81 MG tablet Take 1 tablet by mouth daily.     • cetirizine (zyrTEC) 10 MG tablet TAKE 1 TABLET BY MOUTH DAILY 30 tablet 5   • flecainide (TAMBOCOR) 150 MG tablet Take 0.5 tablets by mouth 2 (Two) Times a Day. 90 tablet 3   • levothyroxine (SYNTHROID, LEVOTHROID) 137 MCG tablet Take 1 tablet by mouth Daily. 90 tablet 0   • lisinopril (PRINIVIL,ZESTRIL) 2.5 MG tablet TAKE 1 TABLET BY MOUTH EVERY DAY 90 tablet 5   • nitroglycerin (NITROSTAT) 0.4 MG SL tablet 1 under the tongue as needed for angina, may repeat q5mins for up three doses 30 tablet 3   • pravastatin (PRAVACHOL) 20 MG tablet Take 1 tablet by mouth Daily. 30 tablet 3     No current facility-administered medications for this visit.        ALLERGIES    Patient has no known allergies.    Past Medical History:   Diagnosis Date   • Allergic    • Arrhythmia    • Enlarged heart    • Graves' disease    • Hx of mammogram 2016   • Hyperlipidemia    • Hypertension 2016       Social History     Socioeconomic History   • Marital status:      Spouse name: Not on file   • Number of children: Not on file   • Years of education: Not on file   • Highest education level: Not on file   Social Needs   • Financial resource strain: Not on file   • Food insecurity - worry: Not on file   • Food insecurity - inability: Not on file   • Transportation needs - medical: Not on file   • Transportation needs - non-medical: Not on file   Occupational History   • Not on file   Tobacco Use   • Smoking status: Former Smoker     Last attempt to quit: 2001     Years since quittin.5   • Smokeless tobacco: Never Used   Substance and Sexual Activity   • Alcohol use: No   • Drug use: No   • Sexual activity: Defer   Other Topics Concern   • Not on file   Social History Narrative   • Not on file       Family  "History   Problem Relation Age of Onset   • Hypertension Mother    • Breast cancer Mother    • Bone cancer Mother    • Cancer Mother    • Heart attack Father    • Heart disease Father         pacemaker   • Hyperlipidemia Father    • Hypertension Father    • Diabetes Father         Passed away   • Breast cancer Sister    • Ovarian cancer Sister    • Cancer Sister         sister passed away   • Diabetes Paternal Grandmother        Review of Systems   Constitutional: Positive for fatigue (States she thinks fatigue is largely related to her lack of sleep. ). Negative for diaphoresis.   HENT: Negative for congestion, rhinorrhea and sore throat.    Eyes: Positive for visual disturbance (glasses daily).   Respiratory: Positive for shortness of breath (w/ activity; no change ). Negative for chest tightness.    Cardiovascular: Positive for palpitations (Pt states she has flutters daily; no change ). Negative for chest pain and leg swelling.   Gastrointestinal: Negative for abdominal pain, blood in stool, constipation, diarrhea, nausea and vomiting.   Endocrine: Negative for cold intolerance and heat intolerance.   Genitourinary: Negative for difficulty urinating, dysuria, frequency, hematuria and urgency.   Musculoskeletal: Negative for arthralgias, back pain and neck pain.        Pt c/o spasms in her toes. States \"my toe will kind of freeze up, then go a different direction.\"    Skin: Negative.  Negative for rash and wound.   Allergic/Immunologic: Negative for environmental allergies and food allergies.   Neurological: Positive for dizziness (Pt states that sometimes she walks and it feels like she's walking sideways, then feels like she's going to pass out and get SoB. States she hasn't had an \"episode\" in over 2 months.). Negative for syncope, weakness, light-headedness, numbness and headaches.   Hematological: Bruises/bleeds easily (bruise).   Psychiatric/Behavioral: Positive for sleep disturbance (Pt states that she " "wakes up a lot at night, unsure why she wakes up so much. States she has no issue going to sleep, just staying asleep. ).       Objective   /74   Pulse 69   Ht 167.6 cm (66\")   Wt 85 kg (187 lb 6.4 oz)   SpO2 97%   BMI 30.25 kg/m²   Vitals:    11/13/18 1033   BP: 117/74   Pulse: 69   SpO2: 97%   Weight: 85 kg (187 lb 6.4 oz)   Height: 167.6 cm (66\")      Lab Results (most recent)     None        Physical Exam   Constitutional: She is oriented to person, place, and time. Vital signs are normal. She appears well-developed and well-nourished. She is active and cooperative.   HENT:   Head: Normocephalic.   Eyes: Lids are normal.   Wears glasses    Neck: Normal carotid pulses, no hepatojugular reflux and no JVD present. Carotid bruit is not present.   Cardiovascular: Normal rate, regular rhythm and normal heart sounds.   Pulses:       Radial pulses are 2+ on the right side, and 2+ on the left side.        Dorsalis pedis pulses are 2+ on the right side, and 2+ on the left side.        Posterior tibial pulses are 2+ on the right side, and 2+ on the left side.   Trace edema BLE.   Pulmonary/Chest: Effort normal and breath sounds normal.   Abdominal: Normal appearance and bowel sounds are normal.   Neurological: She is alert and oriented to person, place, and time.   Skin: Skin is warm, dry and intact.   Psychiatric: She has a normal mood and affect. Her speech is normal and behavior is normal. Judgment and thought content normal. Cognition and memory are normal.       Procedure   Procedures         Assessment/Plan      Diagnosis Plan   1. Essential hypertension     2. PAC (premature atrial contraction)  Cardiac Event Monitor   3. Supraventricular tachycardia (CMS/HCC)  Cardiac Event Monitor   4. Shortness of breath     5. Dyslipidemia         Return in about 9 weeks (around 1/15/2019).         Hypertension-patient doing very well.  PVCs/SVT/palpitations-patient will wear an event monitor for 2 weeks.  Shortness " of breath-stable.  Dyslipidemia-patient is on a statin.  Carotid artery disease-patient is on aspirin and statin.  She'll continue her medication regimen.  She'll follow-up in 9 weeks or sooner if any changes.    Patient's Body mass index is 30.25 kg/m². BMI is above normal parameters. Recommendations include: educational material.      Electronically signed by:

## 2018-11-13 NOTE — PATIENT INSTRUCTIONS
"Fat and Cholesterol Restricted Diet  Getting too much fat and cholesterol in your diet may cause health problems. Following this diet helps keep your fat and cholesterol at normal levels. This can keep you from getting sick.  What types of fat should I choose?  · Choose monosaturated and polyunsaturated fats. These are found in foods such as olive oil, canola oil, flaxseeds, walnuts, almonds, and seeds.  · Eat more omega-3 fats. Good choices include salmon, mackerel, sardines, tuna, flaxseed oil, and ground flaxseeds.  · Limit saturated fats. These are in animal products such as meats, butter, and cream. They can also be in plant products such as palm oil, palm kernel oil, and coconut oil.  · Avoid foods with partially hydrogenated oils in them. These contain trans fats. Examples of foods that have trans fats are stick margarine, some tub margarines, cookies, crackers, and other baked goods.  What general guidelines do I need to follow?  · Check food labels. Look for the words \"trans fat\" and \"saturated fat.\"  · When preparing a meal:  ? Fill half of your plate with vegetables and green salads.  ? Fill one fourth of your plate with whole grains. Look for the word \"whole\" as the first word in the ingredient list.  ? Fill one fourth of your plate with lean protein foods.  · Eat more foods that have fiber, like apples, carrots, beans, peas, and barley.  · Eat more home-cooked foods. Eat less at restaurants and buffets.  · Limit or avoid alcohol.  · Limit foods high in starch and sugar.  · Limit fried foods.  · Cook foods without frying them. Baking, boiling, grilling, and broiling are all great options.  · Lose weight if you are overweight. Losing even a small amount of weight can help your overall health. It can also help prevent diseases such as diabetes and heart disease.  What foods can I eat?  Grains  Whole grains, such as whole wheat or whole grain breads, crackers, cereals, and pasta. Unsweetened oatmeal, " bulgur, barley, quinoa, or brown rice. Corn or whole wheat flour tortillas.  Vegetables  Fresh or frozen vegetables (raw, steamed, roasted, or grilled). Green salads.  Fruits  All fresh, canned (in natural juice), or frozen fruits.  Meat and Other Protein Products  Ground beef (85% or leaner), grass-fed beef, or beef trimmed of fat. Skinless chicken or turkey. Ground chicken or turkey. Pork trimmed of fat. All fish and seafood. Eggs. Dried beans, peas, or lentils. Unsalted nuts or seeds. Unsalted canned or dry beans.  Dairy  Low-fat dairy products, such as skim or 1% milk, 2% or reduced-fat cheeses, low-fat ricotta or cottage cheese, or plain low-fat yogurt.  Fats and Oils  Tub margarines without trans fats. Light or reduced-fat mayonnaise and salad dressings. Avocado. Olive, canola, sesame, or safflower oils. Natural peanut or almond butter (choose ones without added sugar and oil).  The items listed above may not be a complete list of recommended foods or beverages. Contact your dietitian for more options.  What foods are not recommended?  Grains  White bread. White pasta. White rice. Cornbread. Bagels, pastries, and croissants. Crackers that contain trans fat.  Vegetables  White potatoes. Corn. Creamed or fried vegetables. Vegetables in a cheese sauce.  Fruits  Dried fruits. Canned fruit in light or heavy syrup. Fruit juice.  Meat and Other Protein Products  Fatty cuts of meat. Ribs, chicken wings, sosa, sausage, bologna, salami, chitterlings, fatback, hot dogs, bratwurst, and packaged luncheon meats. Liver and organ meats.  Dairy  Whole or 2% milk, cream, half-and-half, and cream cheese. Whole milk cheeses. Whole-fat or sweetened yogurt. Full-fat cheeses. Nondairy creamers and whipped toppings. Processed cheese, cheese spreads, or cheese curds.  Sweets and Desserts  Corn syrup, sugars, honey, and molasses. Candy. Jam and jelly. Syrup. Sweetened cereals. Cookies, pies, cakes, donuts, muffins, and ice  cream.  Fats and Oils  Butter, stick margarine, lard, shortening, ghee, or sosa fat. Coconut, palm kernel, or palm oils.  Beverages  Alcohol. Sweetened drinks (such as sodas, lemonade, and fruit drinks or punches).  The items listed above may not be a complete list of foods and beverages to avoid. Contact your dietitian for more information.  This information is not intended to replace advice given to you by your health care provider. Make sure you discuss any questions you have with your health care provider.  Document Released: 06/18/2013 Document Revised: 08/24/2017 Document Reviewed: 03/19/2015  [employer] Interactive Patient Education © 2018 [employer]  BMI for Adults  Body mass index (BMI) is a number that is calculated from a person's weight and height. In most adults, the number is used to find how much of an adult's weight is made up of fat. BMI is not as accurate as a direct measure of body fat.  How is BMI calculated?  BMI is calculated by dividing weight in kilograms by height in meters squared. It can also be calculated by dividing weight in pounds by height in inches squared, then multiplying the resulting number by 703. Charts are available to help you find your BMI quickly and easily without doing this calculation.  How is BMI interpreted?  Health care professionals use BMI charts to identify whether an adult is underweight, at a normal weight, or overweight based on the following guidelines:  · Underweight: BMI less than 18.5.  · Normal weight: BMI between 18.5 and 24.9.  · Overweight: BMI between 25 and 29.9.  · Obese: BMI of 30 and above.    BMI is usually interpreted the same for males and females.  Weight includes both fat and muscle, so someone with a muscular build, such as an athlete, may have a BMI that is higher than 24.9. In cases like these, BMI may not accurately depict body fat. To determine if excess body fat is the cause of a BMI of 25 or higher, further assessments may need to be  done by a health care provider.  Why is BMI a useful tool?  BMI is used to identify a possible weight problem that may be related to a medical problem or may increase the risk for medical problems. BMI can also be used to promote changes to reach a healthy weight.  This information is not intended to replace advice given to you by your health care provider. Make sure you discuss any questions you have with your health care provider.  Document Released: 08/29/2005 Document Revised: 04/27/2017 Document Reviewed: 05/15/2015  [employer] Interactive Patient Education © 2018 [employer]    Palpitations  A palpitation is the feeling that your heartbeat is irregular or is faster than normal. It may feel like your heart is fluttering or skipping a beat. Palpitations are usually not a serious problem. They may be caused by many things, including smoking, caffeine, alcohol, stress, and certain medicines. Although most causes of palpitations are not serious, palpitations can be a sign of a serious medical problem. In some cases, you may need further medical evaluation.  Follow these instructions at home:  Pay attention to any changes in your symptoms. Take these actions to help with your condition:  · Avoid the following:  ? Caffeinated coffee, tea, soft drinks, diet pills, and energy drinks.  ? Chocolate.  ? Alcohol.  · Do not use any tobacco products, such as cigarettes, chewing tobacco, and e-cigarettes. If you need help quitting, ask your health care provider.  · Try to reduce your stress and anxiety. Things that can help you relax include:  ? Yoga.  ? Meditation.  ? Physical activity, such as swimming, jogging, or walking.  ? Biofeedback. This is a method that helps you learn to use your mind to control things in your body, such as your heartbeats.  · Get plenty of rest and sleep.  · Take over-the-counter and prescription medicines only as told by your health care provider.  · Keep all follow-up visits as told by your  health care provider. This is important.    Contact a health care provider if:  · You continue to have a fast or irregular heartbeat after 24 hours.  · Your palpitations occur more often.  Get help right away if:  · You have chest pain or shortness of breath.  · You have a severe headache.  · You feel dizzy or you faint.  This information is not intended to replace advice given to you by your health care provider. Make sure you discuss any questions you have with your health care provider.  Document Released: 12/15/2001 Document Revised: 05/22/2017 Document Reviewed: 09/01/2016  [employer] Interactive Patient Education © 2018 [employer]

## 2018-11-20 ENCOUNTER — TELEPHONE (OUTPATIENT)
Dept: CARDIOLOGY | Facility: CLINIC | Age: 63
End: 2018-11-20

## 2018-11-20 DIAGNOSIS — R00.2 PALPITATIONS: Primary | ICD-10-CM

## 2018-11-20 NOTE — TELEPHONE ENCOUNTER
----- Message from Elizabeth Ledesma sent at 11/20/2018 11:56 AM EST -----  Regarding: FW: NEW ECHO      ----- Message -----  From: Harper Villeda RegSched Rep  Sent: 11/20/2018  11:31 AM  To: Esperanza Garza, Elizabeth Ledesma  Subject: NEW ECHO                                         I need a new echo order on this patient. Thank you.

## 2018-11-23 DIAGNOSIS — E03.9 ACQUIRED HYPOTHYROIDISM: ICD-10-CM

## 2018-11-26 RX ORDER — LEVOTHYROXINE SODIUM 137 UG/1
137 TABLET ORAL DAILY
Qty: 90 TABLET | Refills: 0 | Status: SHIPPED | OUTPATIENT
Start: 2018-11-26 | End: 2018-12-20

## 2018-12-02 ENCOUNTER — OUTSIDE FACILITY SERVICE (OUTPATIENT)
Dept: CARDIOLOGY | Facility: CLINIC | Age: 63
End: 2018-12-02
Payer: OTHER GOVERNMENT

## 2018-12-02 PROCEDURE — 93228 REMOTE 30 DAY ECG REV/REPORT: CPT | Performed by: INTERNAL MEDICINE

## 2018-12-18 ENCOUNTER — TELEPHONE (OUTPATIENT)
Dept: CARDIOLOGY | Facility: CLINIC | Age: 63
End: 2018-12-18

## 2018-12-18 NOTE — TELEPHONE ENCOUNTER
Pt's monitor results showed baseline HR of 75.7 BPM. There were 4 stable events that occurred, which Mary will address at follow up appt on 1/28/19.

## 2018-12-20 ENCOUNTER — OFFICE VISIT (OUTPATIENT)
Dept: FAMILY MEDICINE CLINIC | Facility: CLINIC | Age: 63
End: 2018-12-20

## 2018-12-20 VITALS
TEMPERATURE: 98.3 F | DIASTOLIC BLOOD PRESSURE: 75 MMHG | HEIGHT: 66 IN | HEART RATE: 71 BPM | OXYGEN SATURATION: 96 % | WEIGHT: 189.38 LBS | BODY MASS INDEX: 30.44 KG/M2 | SYSTOLIC BLOOD PRESSURE: 115 MMHG

## 2018-12-20 DIAGNOSIS — I47.1 ATRIAL TACHYCARDIA (HCC): ICD-10-CM

## 2018-12-20 DIAGNOSIS — E03.9 ACQUIRED HYPOTHYROIDISM: ICD-10-CM

## 2018-12-20 DIAGNOSIS — I10 ESSENTIAL HYPERTENSION: ICD-10-CM

## 2018-12-20 DIAGNOSIS — B00.9 HERPES SIMPLEX: ICD-10-CM

## 2018-12-20 DIAGNOSIS — E55.9 VITAMIN D DEFICIENCY: ICD-10-CM

## 2018-12-20 DIAGNOSIS — I47.1 SUPRAVENTRICULAR TACHYCARDIA (HCC): ICD-10-CM

## 2018-12-20 DIAGNOSIS — E78.5 DYSLIPIDEMIA: ICD-10-CM

## 2018-12-20 PROCEDURE — 99214 OFFICE O/P EST MOD 30 MIN: CPT | Performed by: NURSE PRACTITIONER

## 2018-12-20 RX ORDER — ACYCLOVIR 400 MG/1
400 TABLET ORAL 2 TIMES DAILY
Qty: 60 TABLET | Refills: 5 | Status: SHIPPED | OUTPATIENT
Start: 2018-12-20 | End: 2019-01-19

## 2018-12-20 RX ORDER — ACYCLOVIR 400 MG/1
400 TABLET ORAL 2 TIMES DAILY
COMMUNITY
End: 2018-12-20

## 2018-12-20 NOTE — PROGRESS NOTES
Akash Ch is a 63 y.o. female.   Patient is here today for follow up on her chronic conditions and have her annual physical.     Essential hypertension  She is taking her Lisinopril as directed and is tolerating it well. She is having no adverse side effects.     Dyslipidemia  She had been eating healthier, eating less fatty and greasy foods, but this summer, she did not do well. 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 She has been trying to do better and walk more though.     Acquired hypothyroidism  She takes her Levothyroxine as directed every morning. She feels her dose is correct, and is having no adverse effect    SVT/ATRIAL TACHYCARDIA AND FLUTTER  She had an ablation 3 years ago for SVT, and it caused her to flutter, follows Cardiology for this. She is on Tambocor for this, still having some flutter issues, and just wore a Holter Monitor, and it was ok. She may be having the ablation again. She has an appointment in January.     Her Mammogram is up to date, from September and was normal. She will have her Pap in January. Her colonoscopy is up to date.    She has problems with cold sores in the winter,very often, so needs refills on her Acyclovir.          The following portions of the patient's history were reviewed and updated as appropriate: allergies, current medications, past family history, past medical history, past social history, past surgical history and problem list.    Review of Systems   Constitutional: Negative.    Eyes: Negative.    Respiratory: Positive for shortness of breath. Negative for apnea, cough, choking, chest tightness, wheezing and stridor.    Cardiovascular: Negative.    Gastrointestinal: Negative.    Genitourinary: Negative.    Musculoskeletal: Positive for neck pain.   Skin: Negative.     Allergic/Immunologic: Negative.    Neurological: Positive for syncope (pre syncope) and light-headedness. Negative for dizziness, weakness and numbness.   Hematological: Negative for adenopathy.   Psychiatric/Behavioral: Negative for confusion and suicidal ideas. The patient is not nervous/anxious.      Vitals:    12/20/18 1508   BP: 115/75   Pulse: 71   Temp: 98.3 °F (36.8 °C)   SpO2: 96%     Objective   Physical Exam   Constitutional: She is oriented to person, place, and time. She appears well-developed and well-nourished. No distress.   HENT:   Head: Normocephalic.   Right Ear: External ear normal.   Left Ear: External ear normal.   Nose: Nose normal.   Mouth/Throat: Oropharynx is clear and moist. No oropharyngeal exudate.   Eyes: Conjunctivae are normal. Pupils are equal, round, and reactive to light.   Neck: Normal range of motion. Neck supple. No tracheal deviation present. No thyromegaly present.   Cardiovascular: Normal rate, regular rhythm, normal heart sounds and intact distal pulses.   No murmur heard.  Pulmonary/Chest: Effort normal and breath sounds normal. No respiratory distress. She has no wheezes. She has no rales. She exhibits no tenderness.   Abdominal: Soft. Bowel sounds are normal. She exhibits no distension and no mass. There is no hepatosplenomegaly or splenomegaly. There is no tenderness. There is no rebound and no guarding. No hernia.   Musculoskeletal: Normal range of motion. She exhibits no edema or tenderness.   Lymphadenopathy:     She has no cervical adenopathy.        Right cervical: No superficial cervical, no deep cervical and no posterior cervical adenopathy present.       Left cervical: No superficial cervical, no deep cervical and no posterior cervical adenopathy present.   Neurological: She is alert and oriented to person, place, and time. Coordination and gait normal.   Skin: Skin is warm and dry. No rash noted.   Psychiatric: She has a normal mood and affect. Her behavior is  normal. Judgment and thought content normal.       Assessment/Plan   Justina was seen today for hypothyroidism, neck pain and mouth lesions.    Diagnoses and all orders for this visit:    Essential hypertension    Left atrial tachycardia and right atrial isthmus-dependent flutter     Supraventricular tachycardia (CMS/HCC)    Dyslipidemia    Acquired hypothyroidism    Vitamin D deficiency    Herpes simplex  -     acyclovir (ZOVIRAX) 400 MG tablet; Take 1 tablet by mouth 2 (Two) Times a Day for 30 days. Take no more than 5 doses a day.         Patient to continue her medications as directed.     Patient advised to follow up with Cardiology as directed, for her CV issues.     Fasting lipid panel ordered, patient to RTC for this. I will contact patient regarding test results and provide instructions regarding any necessary changes in plan of care.    Patient feels her liver enzymes are elevated, related to her increased intake of fatty foods over the summer, and her 10 pound weight gain since her last visit. She will r./t clinic for labs, fasting.    Nutrition and activity goals reviewed including: mainly water to drink, limit white flour/processed sugar, high protein, high fiber carbs, good breakfast, working toward 150 mins cardio per week, resistance training 2x/week.     Zovirax refilled today for herpes simplex.    Patient was encouraged to keep me informed of any acute changes, lack of improvement, or any new concerning symptoms. I advised patient that a CT scan would be ordered, if her symptoms worsen.  I also advised her to go to the ED, if her shortness of breath worsens, or she has an actual syncopal episodes.  Patient voiced understanding of all instructions and denied further questions.    Patient to RTC following her appointment with Cardiology, or sooner if needed.

## 2018-12-21 ENCOUNTER — LAB (OUTPATIENT)
Dept: FAMILY MEDICINE CLINIC | Facility: CLINIC | Age: 63
End: 2018-12-21

## 2018-12-21 DIAGNOSIS — I10 ESSENTIAL HYPERTENSION: ICD-10-CM

## 2018-12-21 DIAGNOSIS — I47.1 SUPRAVENTRICULAR TACHYCARDIA (HCC): ICD-10-CM

## 2018-12-21 DIAGNOSIS — E55.9 VITAMIN D DEFICIENCY: ICD-10-CM

## 2018-12-21 DIAGNOSIS — Z00.00 HEALTHCARE MAINTENANCE: ICD-10-CM

## 2018-12-21 DIAGNOSIS — E03.9 ACQUIRED HYPOTHYROIDISM: ICD-10-CM

## 2018-12-21 DIAGNOSIS — I47.1 ATRIAL TACHYCARDIA (HCC): ICD-10-CM

## 2018-12-21 DIAGNOSIS — E78.5 DYSLIPIDEMIA: ICD-10-CM

## 2018-12-21 LAB
25(OH)D3+25(OH)D2 SERPL-MCNC: 46.5 NG/ML
ALBUMIN SERPL-MCNC: 4.1 G/DL (ref 3.5–5)
ALBUMIN/GLOB SERPL: 1.8 G/DL (ref 1–2)
ALP SERPL-CCNC: 167 U/L (ref 38–126)
ALT SERPL-CCNC: 45 U/L (ref 13–69)
AST SERPL-CCNC: 38 U/L (ref 15–46)
BILIRUB SERPL-MCNC: 0.6 MG/DL (ref 0.2–1.3)
BUN SERPL-MCNC: 15 MG/DL (ref 7–20)
BUN/CREAT SERPL: 21.4 (ref 7.1–23.5)
CALCIUM SERPL-MCNC: 9 MG/DL (ref 8.4–10.2)
CHLORIDE SERPL-SCNC: 105 MMOL/L (ref 98–107)
CHOLEST SERPL-MCNC: 127 MG/DL (ref 0–199)
CO2 SERPL-SCNC: 28 MMOL/L (ref 26–30)
CREAT SERPL-MCNC: 0.7 MG/DL (ref 0.6–1.3)
ERYTHROCYTE [DISTWIDTH] IN BLOOD BY AUTOMATED COUNT: 12.4 % (ref 11.5–14.5)
GLOBULIN SER CALC-MCNC: 2.3 GM/DL
GLUCOSE SERPL-MCNC: 80 MG/DL (ref 74–98)
HCT VFR BLD AUTO: 41.9 % (ref 37–47)
HDLC SERPL-MCNC: 31 MG/DL (ref 40–60)
HGB BLD-MCNC: 14.6 G/DL (ref 12–16)
LDLC SERPL CALC-MCNC: 60 MG/DL (ref 0–99)
MCH RBC QN AUTO: 31.9 PG (ref 27–31)
MCHC RBC AUTO-ENTMCNC: 34.8 G/DL (ref 30–37)
MCV RBC AUTO: 91.7 FL (ref 81–99)
PLATELET # BLD AUTO: 193 10*3/MM3 (ref 130–400)
POTASSIUM SERPL-SCNC: 4 MMOL/L (ref 3.5–5.1)
PROT SERPL-MCNC: 6.4 G/DL (ref 6.3–8.2)
RBC # BLD AUTO: 4.57 10*6/MM3 (ref 4.2–5.4)
SODIUM SERPL-SCNC: 140 MMOL/L (ref 137–145)
T4 FREE SERPL-MCNC: 1.59 NG/DL (ref 0.78–2.19)
TRIGL SERPL-MCNC: 182 MG/DL
TSH SERPL DL<=0.005 MIU/L-ACNC: 1.05 MIU/ML (ref 0.47–4.68)
VLDLC SERPL CALC-MCNC: 36.4 MG/DL
WBC # BLD AUTO: 7.06 10*3/MM3 (ref 4.8–10.8)

## 2018-12-27 RX ORDER — PRAVASTATIN SODIUM 40 MG
40 TABLET ORAL NIGHTLY
Qty: 30 TABLET | Refills: 2 | Status: SHIPPED | OUTPATIENT
Start: 2018-12-27 | End: 2019-01-26

## 2019-01-11 ENCOUNTER — OFFICE VISIT (OUTPATIENT)
Dept: CARDIOLOGY | Facility: CLINIC | Age: 64
End: 2019-01-11

## 2019-01-11 VITALS
WEIGHT: 180 LBS | HEIGHT: 55 IN | BODY MASS INDEX: 41.66 KG/M2 | SYSTOLIC BLOOD PRESSURE: 104 MMHG | HEART RATE: 72 BPM | DIASTOLIC BLOOD PRESSURE: 60 MMHG

## 2019-01-11 DIAGNOSIS — I49.1 PAC (PREMATURE ATRIAL CONTRACTION): Primary | ICD-10-CM

## 2019-01-11 DIAGNOSIS — I47.1 ATRIAL TACHYCARDIA (HCC): ICD-10-CM

## 2019-01-11 DIAGNOSIS — I10 ESSENTIAL HYPERTENSION: ICD-10-CM

## 2019-01-11 PROCEDURE — 93000 ELECTROCARDIOGRAM COMPLETE: CPT | Performed by: NURSE PRACTITIONER

## 2019-01-11 PROCEDURE — 99213 OFFICE O/P EST LOW 20 MIN: CPT | Performed by: NURSE PRACTITIONER

## 2019-01-11 NOTE — PROGRESS NOTES
Justina Ch  1955    There is no work phone number on file.      01/11/2019    Location:    Emma Mccann, APRN  [address] [place] [address]    Chief Complaint   Patient presents with   • Rapid Heart Rate       Problem List:  1. Supraventricular tachycardia:   a. History of tachypalpitations and emergency room visits dating back to at least 2-3 years ago.   b. Emergency room visit in July with subsequent [facility].   c. Event recorder, July 2014, demonstrating runs of narrow QRS tachycardia, as fast as 190-200 beats per minute.   d. Echocardiogram, 07/21/2014: Normal left ventricular size and function with ejection fraction of 55%. No significant valvular insufficiency.   e. [facility], database normal, per patient, database incomplete.  f. Status post EP study and RFA of left atrial tachycardia and right atrial isthmus-dependent flutter on 10/04/2014.  g. Recurrent tachypalpitations with event monitor, 01/23/2015 thru 02/21/2015, showing sinus rhythm with occasional PAC and sinus arrhythmia and no SVT or atrial fibrillation.   h. Initiation of Tambocor therapy for suppression of PACs, 02/27/2015.   i. Echo 7/12/17 EF 65%, trace TR  j. Nuclear stress test 7/12/17 EF 75%, no ischemia  k. Echocardiogram 10/2/18: EF 60%, no significant valvular disease  l. Event monitor 11/19/18: Predominately NSR, <1% PAC's/PVC's  2. Grave’s disease, status post ablation with hypothyroidism.   3. Dyslipidemia.   4. Hypertension.   5. Skin cancer.   6. Surgical history:  a. D and C.   b. Skin excision for skin cancer.      Allergies  No Known Allergies    Current Medications    Current Outpatient Medications:   •  acyclovir (ZOVIRAX) 400 MG tablet, Take 1 tablet by mouth 2 (Two) Times a Day for 30 days. Take no more than 5 doses a day., Disp: 60 tablet, Rfl: 5  •  aspirin 81 MG tablet, Take 1 tablet  "by mouth daily., Disp: , Rfl:   •  cetirizine (zyrTEC) 10 MG tablet, TAKE 1 TABLET BY MOUTH DAILY, Disp: 30 tablet, Rfl: 5  •  flecainide (TAMBOCOR) 150 MG tablet, Take 0.5 tablets by mouth 2 (Two) Times a Day., Disp: 90 tablet, Rfl: 3  •  levothyroxine (SYNTHROID, LEVOTHROID) 137 MCG tablet, Take 1 tablet by mouth Daily., Disp: 90 tablet, Rfl: 0  •  lisinopril (PRINIVIL,ZESTRIL) 2.5 MG tablet, TAKE 1 TABLET BY MOUTH EVERY DAY, Disp: 90 tablet, Rfl: 5  •  nitroglycerin (NITROSTAT) 0.4 MG SL tablet, 1 under the tongue as needed for angina, may repeat q5mins for up three doses, Disp: 30 tablet, Rfl: 3  •  pravastatin (PRAVACHOL) 40 MG tablet, Take 1 tablet by mouth Every Night for 30 days., Disp: 30 tablet, Rfl: 2    History of Present Illness   HPI    Pt presents for follow up of PAC's, palpitations.  Since the pt has seen us in clinic last, echo in October showing normal LVEF and no significant valvular disease.  She wore a monitor in November which showed <1% PAC's/PVC's.  She states her palpitations vary in frequency/intensity but still occur daily.  She may have weeks where they are worse and weeks where she doesn't have much.  No specific triggers that she can identify.  Only drinking 1 cup coffee daily.  No ETOH/tobacco.  She overall thinks the Flecainide is working well.  She denies any syncope, SOB, CP, LH, and dizziness. Denies any hospitalizations, ER visits, bleeding, or TIA/CVA symptoms. Overall feels well.  BP's running well at home.  Recently had her thyroid checked which is normal.    ROS:  General:  Denies fatigue, weight gain or loss  Cardiovascular:  Denies CP, PND, syncope, near syncope, edema + palpitations.  Pulmonary:  Denies KIRKPATRICK, cough, or wheezing    Vitals:    01/11/19 1256   BP: 104/60   BP Location: Right arm   Patient Position: Sitting   Pulse: 72   Weight: 81.6 kg (180 lb)   Height: 66 cm (25.98\")       PE:  General: NAD  Neck: no JVD, no carotid bruits, no TM  Heart RRR, NL S1, S2, no " rubs, murmurs  Lungs: CTA, no wheezes, rhonchi, or rales  Abd: soft, non-tender, NL BS  Ext: No musculoskeletal deformities, no edema, cyanosis, or clubbing  Psych: normal mood and affect    Diagnostic Data:    ECG 12 Lead  Date/Time: 1/11/2019 1:30 PM  Performed by: Cari Burleson APRN  Authorized by: Cari Burleson APRN   Comparison: compared with previous ECG from 9/13/2018  Similar to previous ECG  Rhythm: sinus rhythm  BPM: 72              1. PAC (premature atrial contraction)    2. Left atrial tachycardia and right atrial isthmus-dependent flutter     3. Essential hypertension        Plan:  1) PAC's:  - Currently on Flecainide 75 mg bid.  Still with daily palpitations but overall feels they are well controlled and does not wish to make any changes at this time.  If symptoms become worse, can increase to 100 mg bid and get EKG 48 hours later.  She will let us know.  2) AT/AFL:  - S/p RFA  - Doing well, No recurrence.    3) Anticoagulation:  - Continue ASA  4) HTN:  - Well controlled on meds  - Wt loss, exercise, salt reduction    F/up in 12 months with [facility]  1/11/2019  1:03 PM

## 2019-01-17 DIAGNOSIS — E03.9 ACQUIRED HYPOTHYROIDISM: ICD-10-CM

## 2019-01-17 RX ORDER — LEVOTHYROXINE SODIUM 137 UG/1
137 TABLET ORAL DAILY
Qty: 90 TABLET | Refills: 0 | Status: SHIPPED | OUTPATIENT
Start: 2019-01-17 | End: 2019-04-20 | Stop reason: SDUPTHER

## 2019-01-28 ENCOUNTER — OFFICE VISIT (OUTPATIENT)
Dept: CARDIOLOGY | Facility: CLINIC | Age: 64
End: 2019-01-28

## 2019-01-28 VITALS
BODY MASS INDEX: 30.53 KG/M2 | HEIGHT: 66 IN | DIASTOLIC BLOOD PRESSURE: 70 MMHG | HEART RATE: 72 BPM | SYSTOLIC BLOOD PRESSURE: 124 MMHG | WEIGHT: 190 LBS | OXYGEN SATURATION: 95 %

## 2019-01-28 DIAGNOSIS — R06.02 SHORTNESS OF BREATH: ICD-10-CM

## 2019-01-28 DIAGNOSIS — R00.2 PALPITATIONS: Primary | ICD-10-CM

## 2019-01-28 DIAGNOSIS — I51.7 CARDIOMEGALY: ICD-10-CM

## 2019-01-28 DIAGNOSIS — I10 ESSENTIAL HYPERTENSION: ICD-10-CM

## 2019-01-28 DIAGNOSIS — E78.5 DYSLIPIDEMIA: ICD-10-CM

## 2019-01-28 DIAGNOSIS — I49.1 PAC (PREMATURE ATRIAL CONTRACTION): ICD-10-CM

## 2019-01-28 PROCEDURE — 99213 OFFICE O/P EST LOW 20 MIN: CPT | Performed by: NURSE PRACTITIONER

## 2019-01-28 RX ORDER — PRAVASTATIN SODIUM 40 MG
40 TABLET ORAL NIGHTLY
COMMUNITY
End: 2019-03-18 | Stop reason: SDUPTHER

## 2019-01-28 NOTE — PROGRESS NOTES
Akash Ch is a 63 y.o. female     Chief Complaint   Patient presents with   • Follow-up     6 month follow up on monitor    • Hypertension   • Shortness of Breath       HPI    Problem list:    1. SVT - flecainide therapy  1.1 Event Monitor 5/7-5/20-16 - NSR with PACs and PVCs  1.2  event monitor 11/19-12/2/1A-PACs and PVCs  2. Bradycardia  3. Left atrial tachycardia and right atrial isthmus-dependent flutter   3.1 RFA 10/14/2014  4. Grave’s disease, status post ablation with hypothyroidism.   5. Dyslipidemia.   6. Hypertension  6.1 Stress test 7/23/14 - low risk, no ischemia   6.2 Stress Test 7/12/17-no ischemia, low risk  6.3 Echo 7/21/14 - EF 55-60%; DD II; trace MR, TR  6.4 Echo 7/12/17-EF greater than 65%, diastolic dysfunction 1, trace TR, trace IA, PA 20-25  6.5 Echo 10/2/18 - EF 64%; borderline left atrial enlargement   7. Cardiomegaly  7.1 CT Chest 8/5/15 - Cardiomegaly; suspected hepatosplenomegaly with fatty liver  8. Carotid Artery Disease   8.1 Carotid Artery US 10/2/18 - mild atherosclerotic was noted involving the carotid systems; antegrade flow both vertebral arteries.    Patient is a 63-year-old female who presents today for follow-up on testing.  She denies any chest pain, pressure, dizziness, presyncope, syncope, orthopnea, PND or edema.  She says that she does notice when she has palpitations and there pre-much daily.  When she saw EP they recommended increasing her flecainide to 100 mg twice a day however she does not want to do that at this time.  She says that she does have shortness of breath when she exerts herself more than normal.  Her cough is only very occasion not something that is regular.    We went over event monitor.    Current Outpatient Medications   Medication Sig Dispense Refill   • aspirin 81 MG tablet Take 1 tablet by mouth daily.     • cetirizine (zyrTEC) 10 MG tablet TAKE 1 TABLET BY MOUTH DAILY 30 tablet 5   • flecainide (TAMBOCOR) 150 MG tablet Take 0.5  tablets by mouth 2 (Two) Times a Day. 90 tablet 3   • levothyroxine (SYNTHROID, LEVOTHROID) 137 MCG tablet TAKE 1 TABLET BY MOUTH DAILY 90 tablet 0   • lisinopril (PRINIVIL,ZESTRIL) 2.5 MG tablet TAKE 1 TABLET BY MOUTH EVERY DAY 90 tablet 5   • nitroglycerin (NITROSTAT) 0.4 MG SL tablet 1 under the tongue as needed for angina, may repeat q5mins for up three doses 30 tablet 3   • pravastatin (PRAVACHOL) 40 MG tablet Take 40 mg by mouth Every Night.       No current facility-administered medications for this visit.        ALLERGIES    Patient has no known allergies.    Past Medical History:   Diagnosis Date   • Allergic    • Arrhythmia    • Enlarged heart    • Graves' disease    • Hx of mammogram 2016   • Hyperlipidemia    • Hypertension 2016       Social History     Socioeconomic History   • Marital status:      Spouse name: Not on file   • Number of children: Not on file   • Years of education: Not on file   • Highest education level: Not on file   Social Needs   • Financial resource strain: Not on file   • Food insecurity - worry: Not on file   • Food insecurity - inability: Not on file   • Transportation needs - medical: Not on file   • Transportation needs - non-medical: Not on file   Occupational History   • Not on file   Tobacco Use   • Smoking status: Former Smoker     Packs/day: 0.50     Years: 25.00     Pack years: 12.50     Types: Cigarettes     Last attempt to quit: 2001     Years since quittin.7   • Smokeless tobacco: Never Used   Substance and Sexual Activity   • Alcohol use: No   • Drug use: No   • Sexual activity: Defer   Other Topics Concern   • Not on file   Social History Narrative   • Not on file       Family History   Problem Relation Age of Onset   • Hypertension Mother    • Breast cancer Mother    • Bone cancer Mother    • Cancer Mother    • Heart attack Father    • Heart disease Father         pacemaker   • Hyperlipidemia Father    • Hypertension Father    •  "Diabetes Father         Passed away   • Breast cancer Sister    • Ovarian cancer Sister    • Cancer Sister         sister passed away   • Diabetes Paternal Grandmother        Review of Systems   Constitutional: Negative for chills, fatigue and fever.   HENT: Negative for congestion and sore throat.    Eyes: Positive for visual disturbance (Glasses daily ).   Respiratory: Positive for cough (Dry cough occasionally ) and shortness of breath (SOA with exertion ). Negative for chest tightness.    Cardiovascular: Positive for palpitations (Daily palpitations). Negative for chest pain and leg swelling.   Gastrointestinal: Negative for abdominal pain, nausea and vomiting.   Endocrine: Negative for cold intolerance and heat intolerance.   Genitourinary: Negative for dysuria, frequency and urgency.   Musculoskeletal: Negative for arthralgias and back pain.   Skin: Negative for wound.   Allergic/Immunologic: Positive for environmental allergies (Seasonal).   Neurological: Negative for dizziness, weakness and light-headedness.   Hematological: Bruises/bleeds easily (Bruises).   Psychiatric/Behavioral: Positive for sleep disturbance (Wakes intermittently ).       Objective   /70   Pulse 72   Ht 167.6 cm (66\")   Wt 86.2 kg (190 lb)   SpO2 95%   BMI 30.67 kg/m²   Vitals:    01/28/19 1034   BP: 124/70   Pulse: 72   SpO2: 95%   Weight: 86.2 kg (190 lb)   Height: 167.6 cm (66\")      Lab Results (most recent)     None        Physical Exam   Constitutional: She is oriented to person, place, and time. Vital signs are normal. She appears well-developed and well-nourished. She is active and cooperative.   HENT:   Head: Normocephalic.   Eyes: Lids are normal.   Wears glasses    Neck: Normal carotid pulses, no hepatojugular reflux and no JVD present. Carotid bruit is not present.   Cardiovascular: Normal rate, regular rhythm and normal heart sounds.   Pulses:       Radial pulses are 2+ on the right side, and 2+ on the left side. "        Dorsalis pedis pulses are 2+ on the right side, and 2+ on the left side.        Posterior tibial pulses are 2+ on the right side, and 2+ on the left side.   Trace edema BLE.    Pulmonary/Chest: Effort normal and breath sounds normal.   Abdominal: Normal appearance and bowel sounds are normal.   Neurological: She is alert and oriented to person, place, and time.   Skin: Skin is warm, dry and intact.   Psychiatric: She has a normal mood and affect. Her speech is normal and behavior is normal. Judgment and thought content normal. Cognition and memory are normal.       Procedure   Procedures         Assessment/Plan      Diagnosis Plan   1. Palpitations     2. Cardiomegaly     3. Essential hypertension     4. PAC (premature atrial contraction)     5. Shortness of breath     6. Dyslipidemia         Return in about 6 months (around 7/28/2019).    Palpitations/PAC/PVCs-stable, on flecainide.  Cardiomegaly-patient doing well.  Hypertension-patient doing very well.  She started to work out so that she can lose weight.  Shortness of breath-stable.  Dyslipidemia-patient is on pravastatin.  She will be getting lab work with her PCP at next couple weeks.  She will continue her medication regimen.  She'll follow-up in 6 months or sooner if any changes.       Patient's Body mass index is 30.67 kg/m². BMI is above normal parameters. Recommendations include: educational material.      Electronically signed by:

## 2019-01-28 NOTE — PATIENT INSTRUCTIONS
"bmi    Fat and Cholesterol Restricted Diet  Getting too much fat and cholesterol in your diet may cause health problems. Following this diet helps keep your fat and cholesterol at normal levels. This can keep you from getting sick.  What types of fat should I choose?  · Choose monosaturated and polyunsaturated fats. These are found in foods such as olive oil, canola oil, flaxseeds, walnuts, almonds, and seeds.  · Eat more omega-3 fats. Good choices include salmon, mackerel, sardines, tuna, flaxseed oil, and ground flaxseeds.  · Limit saturated fats. These are in animal products such as meats, butter, and cream. They can also be in plant products such as palm oil, palm kernel oil, and coconut oil.  · Avoid foods with partially hydrogenated oils in them. These contain trans fats. Examples of foods that have trans fats are stick margarine, some tub margarines, cookies, crackers, and other baked goods.  What general guidelines do I need to follow?  · Check food labels. Look for the words \"trans fat\" and \"saturated fat.\"  · When preparing a meal:  ? Fill half of your plate with vegetables and green salads.  ? Fill one fourth of your plate with whole grains. Look for the word \"whole\" as the first word in the ingredient list.  ? Fill one fourth of your plate with lean protein foods.  · Eat more foods that have fiber, like apples, carrots, beans, peas, and barley.  · Eat more home-cooked foods. Eat less at restaurants and buffets.  · Limit or avoid alcohol.  · Limit foods high in starch and sugar.  · Limit fried foods.  · Cook foods without frying them. Baking, boiling, grilling, and broiling are all great options.  · Lose weight if you are overweight. Losing even a small amount of weight can help your overall health. It can also help prevent diseases such as diabetes and heart disease.  What foods can I eat?  Grains  Whole grains, such as whole wheat or whole grain breads, crackers, cereals, and pasta. Unsweetened oatmeal, " bulgur, barley, quinoa, or brown rice. Corn or whole wheat flour tortillas.  Vegetables  Fresh or frozen vegetables (raw, steamed, roasted, or grilled). Green salads.  Fruits  All fresh, canned (in natural juice), or frozen fruits.  Meat and Other Protein Products  Ground beef (85% or leaner), grass-fed beef, or beef trimmed of fat. Skinless chicken or turkey. Ground chicken or turkey. Pork trimmed of fat. All fish and seafood. Eggs. Dried beans, peas, or lentils. Unsalted nuts or seeds. Unsalted canned or dry beans.  Dairy  Low-fat dairy products, such as skim or 1% milk, 2% or reduced-fat cheeses, low-fat ricotta or cottage cheese, or plain low-fat yogurt.  Fats and Oils  Tub margarines without trans fats. Light or reduced-fat mayonnaise and salad dressings. Avocado. Olive, canola, sesame, or safflower oils. Natural peanut or almond butter (choose ones without added sugar and oil).  The items listed above may not be a complete list of recommended foods or beverages. Contact your dietitian for more options.  What foods are not recommended?  Grains  White bread. White pasta. White rice. Cornbread. Bagels, pastries, and croissants. Crackers that contain trans fat.  Vegetables  White potatoes. Corn. Creamed or fried vegetables. Vegetables in a cheese sauce.  Fruits  Dried fruits. Canned fruit in light or heavy syrup. Fruit juice.  Meat and Other Protein Products  Fatty cuts of meat. Ribs, chicken wings, sosa, sausage, bologna, salami, chitterlings, fatback, hot dogs, bratwurst, and packaged luncheon meats. Liver and organ meats.  Dairy  Whole or 2% milk, cream, half-and-half, and cream cheese. Whole milk cheeses. Whole-fat or sweetened yogurt. Full-fat cheeses. Nondairy creamers and whipped toppings. Processed cheese, cheese spreads, or cheese curds.  Sweets and Desserts  Corn syrup, sugars, honey, and molasses. Candy. Jam and jelly. Syrup. Sweetened cereals. Cookies, pies, cakes, donuts, muffins, and ice  cream.  Fats and Oils  Butter, stick margarine, lard, shortening, ghee, or sosa fat. Coconut, palm kernel, or palm oils.  Beverages  Alcohol. Sweetened drinks (such as sodas, lemonade, and fruit drinks or punches).  The items listed above may not be a complete list of foods and beverages to avoid. Contact your dietitian for more information.  This information is not intended to replace advice given to you by your health care provider. Make sure you discuss any questions you have with your health care provider.  Document Released: 06/18/2013 Document Revised: 08/24/2017 Document Reviewed: 03/19/2015  [employer] Interactive Patient Education © 2018 [employer]  BMI for Adults  Body mass index (BMI) is a number that is calculated from a person's weight and height. In most adults, the number is used to find how much of an adult's weight is made up of fat. BMI is not as accurate as a direct measure of body fat.  How is BMI calculated?  BMI is calculated by dividing weight in kilograms by height in meters squared. It can also be calculated by dividing weight in pounds by height in inches squared, then multiplying the resulting number by 703. Charts are available to help you find your BMI quickly and easily without doing this calculation.  How is BMI interpreted?  Health care professionals use BMI charts to identify whether an adult is underweight, at a normal weight, or overweight based on the following guidelines:  · Underweight: BMI less than 18.5.  · Normal weight: BMI between 18.5 and 24.9.  · Overweight: BMI between 25 and 29.9.  · Obese: BMI of 30 and above.    BMI is usually interpreted the same for males and females.  Weight includes both fat and muscle, so someone with a muscular build, such as an athlete, may have a BMI that is higher than 24.9. In cases like these, BMI may not accurately depict body fat. To determine if excess body fat is the cause of a BMI of 25 or higher, further assessments may need to be  done by a health care provider.  Why is BMI a useful tool?  BMI is used to identify a possible weight problem that may be related to a medical problem or may increase the risk for medical problems. BMI can also be used to promote changes to reach a healthy weight.  This information is not intended to replace advice given to you by your health care provider. Make sure you discuss any questions you have with your health care provider.  Document Released: 08/29/2005 Document Revised: 04/27/2017 Document Reviewed: 05/15/2015  [employer] Interactive Patient Education © 2018 [employer]    Palpitations  A palpitation is the feeling that your heartbeat is irregular or is faster than normal. It may feel like your heart is fluttering or skipping a beat. Palpitations are usually not a serious problem. They may be caused by many things, including smoking, caffeine, alcohol, stress, and certain medicines. Although most causes of palpitations are not serious, palpitations can be a sign of a serious medical problem. In some cases, you may need further medical evaluation.  Follow these instructions at home:  Pay attention to any changes in your symptoms. Take these actions to help with your condition:  · Avoid the following:  ? Caffeinated coffee, tea, soft drinks, diet pills, and energy drinks.  ? Chocolate.  ? Alcohol.  · Do not use any tobacco products, such as cigarettes, chewing tobacco, and e-cigarettes. If you need help quitting, ask your health care provider.  · Try to reduce your stress and anxiety. Things that can help you relax include:  ? Yoga.  ? Meditation.  ? Physical activity, such as swimming, jogging, or walking.  ? Biofeedback. This is a method that helps you learn to use your mind to control things in your body, such as your heartbeats.  · Get plenty of rest and sleep.  · Take over-the-counter and prescription medicines only as told by your health care provider.  · Keep all follow-up visits as told by your  health care provider. This is important.    Contact a health care provider if:  · You continue to have a fast or irregular heartbeat after 24 hours.  · Your palpitations occur more often.  Get help right away if:  · You have chest pain or shortness of breath.  · You have a severe headache.  · You feel dizzy or you faint.  This information is not intended to replace advice given to you by your health care provider. Make sure you discuss any questions you have with your health care provider.  Document Released: 12/15/2001 Document Revised: 05/22/2017 Document Reviewed: 09/01/2016  [employer] Interactive Patient Education © 2018 [employer]

## 2019-02-17 DIAGNOSIS — J30.2 SEASONAL ALLERGIC RHINITIS: ICD-10-CM

## 2019-02-17 DIAGNOSIS — R09.82 PND (POST-NASAL DRIP): ICD-10-CM

## 2019-02-18 RX ORDER — CETIRIZINE HYDROCHLORIDE 10 MG/1
10 TABLET ORAL DAILY
Qty: 30 TABLET | Refills: 5 | Status: SHIPPED | OUTPATIENT
Start: 2019-02-18 | End: 2019-08-11 | Stop reason: SDUPTHER

## 2019-02-23 DIAGNOSIS — I10 ESSENTIAL HYPERTENSION: ICD-10-CM

## 2019-02-25 RX ORDER — LISINOPRIL 2.5 MG/1
TABLET ORAL
Qty: 30 TABLET | Refills: 0 | Status: SHIPPED | OUTPATIENT
Start: 2019-02-25 | End: 2019-03-08 | Stop reason: SDUPTHER

## 2019-03-08 ENCOUNTER — OFFICE VISIT (OUTPATIENT)
Dept: FAMILY MEDICINE CLINIC | Facility: CLINIC | Age: 64
End: 2019-03-08

## 2019-03-08 VITALS
DIASTOLIC BLOOD PRESSURE: 75 MMHG | BODY MASS INDEX: 30.44 KG/M2 | TEMPERATURE: 98.7 F | HEART RATE: 79 BPM | HEIGHT: 66 IN | SYSTOLIC BLOOD PRESSURE: 130 MMHG | OXYGEN SATURATION: 94 % | WEIGHT: 189.38 LBS

## 2019-03-08 DIAGNOSIS — J01.10 ACUTE NON-RECURRENT FRONTAL SINUSITIS: Primary | ICD-10-CM

## 2019-03-08 DIAGNOSIS — J20.9 ACUTE BRONCHITIS, UNSPECIFIED ORGANISM: ICD-10-CM

## 2019-03-08 LAB
EXPIRATION DATE: NORMAL
FLUAV AG NPH QL: NEGATIVE
FLUBV AG NPH QL: NEGATIVE
INTERNAL CONTROL: NORMAL
Lab: NORMAL

## 2019-03-08 PROCEDURE — 96372 THER/PROPH/DIAG INJ SC/IM: CPT | Performed by: NURSE PRACTITIONER

## 2019-03-08 PROCEDURE — 87804 INFLUENZA ASSAY W/OPTIC: CPT | Performed by: NURSE PRACTITIONER

## 2019-03-08 PROCEDURE — 99213 OFFICE O/P EST LOW 20 MIN: CPT | Performed by: NURSE PRACTITIONER

## 2019-03-08 RX ORDER — AMOXICILLIN AND CLAVULANATE POTASSIUM 875; 125 MG/1; MG/1
1 TABLET, FILM COATED ORAL EVERY 12 HOURS SCHEDULED
Qty: 20 TABLET | Refills: 0 | Status: SHIPPED | OUTPATIENT
Start: 2019-03-08 | End: 2019-03-18

## 2019-03-08 RX ORDER — METHYLPREDNISOLONE ACETATE 80 MG/ML
80 INJECTION, SUSPENSION INTRA-ARTICULAR; INTRALESIONAL; INTRAMUSCULAR; SOFT TISSUE ONCE
Status: COMPLETED | OUTPATIENT
Start: 2019-03-08 | End: 2019-03-08

## 2019-03-08 RX ORDER — PREDNISONE 20 MG/1
20 TABLET ORAL 2 TIMES DAILY
Qty: 20 TABLET | Refills: 0 | Status: SHIPPED | OUTPATIENT
Start: 2019-03-08 | End: 2019-03-18

## 2019-03-08 RX ADMIN — METHYLPREDNISOLONE ACETATE 80 MG: 80 INJECTION, SUSPENSION INTRA-ARTICULAR; INTRALESIONAL; INTRAMUSCULAR; SOFT TISSUE at 17:25

## 2019-03-08 NOTE — PROGRESS NOTES
Akash Ch is a 63 y.o. female.     Patient is here today, for complaints of  a cough, congestion and sore throat.  The symptoms started about 3 days ago, with her left ear feeling congested. This continued to get worse, and she developed and cough, congestion and sore throat.   Today, the left side of her neck is sore and she has had a headache.  She also has body aches.  Her cough is not productive.  When she blows her nose, the drainage is clear.  She has pain/pressure in her frontal sinuses. In the past, when she has had these symptoms, she has ended up with bronchitis.  She has had recent sick contacts with the flu.  She has not tried anything OTC.        The following portions of the patient's history were reviewed and updated as appropriate: allergies, current medications, past family history, past medical history, past social history, past surgical history and problem list.    Review of Systems   Constitutional: Negative.    HENT: Positive for congestion, ear pain, postnasal drip, sinus pressure, sinus pain and sore throat.    Eyes: Negative.    Respiratory: Positive for cough. Negative for apnea, choking, chest tightness, shortness of breath, wheezing and stridor.    Cardiovascular: Negative.    Gastrointestinal: Negative.    Genitourinary: Negative.    Musculoskeletal: Negative.    Skin: Negative.    Allergic/Immunologic: Negative.    Neurological: Negative for dizziness, syncope, weakness, numbness and headaches.   Hematological: Negative for adenopathy.   Psychiatric/Behavioral: Negative for confusion and suicidal ideas. The patient is not nervous/anxious.      Vitals:    03/08/19 1553   BP: 130/75   Pulse: 79   Temp: 98.7 °F (37.1 °C)   SpO2: 94%     Objective   Physical Exam   Constitutional: She is oriented to person, place, and time. She appears well-developed and well-nourished. No distress.   HENT:   Head: Normocephalic.   Right Ear: External ear normal.   Left Ear: External ear  normal.   Nose: Right sinus exhibits frontal sinus tenderness. Left sinus exhibits frontal sinus tenderness.   Mouth/Throat: Oropharyngeal exudate present.   Eyes: Conjunctivae are normal.   Neck: Normal range of motion. Neck supple.   Cardiovascular: Normal rate, regular rhythm and normal heart sounds.   No murmur heard.  Pulmonary/Chest: Effort normal. No respiratory distress. She has wheezes in the right upper field and the left upper field. She has no rales. She exhibits no tenderness.   Abdominal: Soft. Bowel sounds are normal. She exhibits no distension and no mass. There is no hepatosplenomegaly or splenomegaly. There is no tenderness. There is no rebound and no guarding. No hernia.   Musculoskeletal: Normal range of motion. She exhibits no edema or tenderness.   NROM all major joints   Lymphadenopathy:        Right cervical: No superficial cervical, no deep cervical and no posterior cervical adenopathy present.       Left cervical: No superficial cervical, no deep cervical and no posterior cervical adenopathy present.   Neurological: She is alert and oriented to person, place, and time. Coordination and gait normal.   Skin: Skin is warm and dry. No rash noted.   Psychiatric: She has a normal mood and affect. Her behavior is normal. Judgment and thought content normal.   Nursing note and vitals reviewed.      Assessment/Plan   Justina was seen today for cough, earache, nasal congestion, sore throat and nausea.    Diagnoses and all orders for this visit:    Acute non-recurrent frontal sinusitis  -     amoxicillin-clavulanate (AUGMENTIN) 875-125 MG per tablet; Take 1 tablet by mouth Every 12 (Twelve) Hours for 10 days.  -     POCT Influenza A/B  -     methylPREDNISolone acetate (DEPO-medrol) injection 80 mg; Inject 1 mL into the appropriate muscle as directed by prescriber 1 (One) Time.    Acute bronchitis, unspecified organism  -     methylPREDNISolone acetate (DEPO-medrol) injection 80 mg; Inject 1 mL into the  appropriate muscle as directed by prescriber 1 (One) Time.    Other orders  -     predniSONE (DELTASONE) 20 MG tablet; Take 1 tablet by mouth 2 (Two) Times a Day for 10 days.     Flu screen negative in the clinic today.    Augmentin started today, for treatment of her sinusitis. Depo Medrol 80 mg, given in office today, IM, and this will help her sinusitis as well, and will treat her acute bronchitis. Prednisone 20 mg po bid, prescribed today, and she will start it in 48 hours, if her bronchitis symptoms have not improved.     Patient was encouraged to keep me informed of any acute changes, lack of improvement, or any new concerning symptoms. Patient voiced understanding of all instructions and denied further questions.    Patient to RTC in 1 week if symptoms do not improve, sooner if the worsen.

## 2019-03-18 RX ORDER — PRAVASTATIN SODIUM 40 MG
40 TABLET ORAL NIGHTLY
Qty: 30 TABLET | Refills: 3 | Status: SHIPPED | OUTPATIENT
Start: 2019-03-18 | End: 2019-06-16 | Stop reason: SDUPTHER

## 2019-03-18 NOTE — TELEPHONE ENCOUNTER
Medication refill request for Pravastatin 40 mg.    Patients medication has been sent to the pharmacy.

## 2019-03-21 ENCOUNTER — OFFICE VISIT (OUTPATIENT)
Dept: FAMILY MEDICINE CLINIC | Facility: CLINIC | Age: 64
End: 2019-03-21

## 2019-03-21 VITALS
HEART RATE: 65 BPM | WEIGHT: 189 LBS | DIASTOLIC BLOOD PRESSURE: 80 MMHG | BODY MASS INDEX: 30.37 KG/M2 | OXYGEN SATURATION: 96 % | SYSTOLIC BLOOD PRESSURE: 130 MMHG | HEIGHT: 66 IN

## 2019-03-21 DIAGNOSIS — R00.2 PALPITATIONS: ICD-10-CM

## 2019-03-21 DIAGNOSIS — I47.1 SUPRAVENTRICULAR TACHYCARDIA (HCC): ICD-10-CM

## 2019-03-21 DIAGNOSIS — E78.5 DYSLIPIDEMIA: ICD-10-CM

## 2019-03-21 DIAGNOSIS — E03.9 ACQUIRED HYPOTHYROIDISM: ICD-10-CM

## 2019-03-21 DIAGNOSIS — H65.03 BILATERAL ACUTE SEROUS OTITIS MEDIA, RECURRENCE NOT SPECIFIED: ICD-10-CM

## 2019-03-21 DIAGNOSIS — J30.2 SEASONAL ALLERGIC RHINITIS, UNSPECIFIED TRIGGER: ICD-10-CM

## 2019-03-21 DIAGNOSIS — I10 ESSENTIAL HYPERTENSION: ICD-10-CM

## 2019-03-21 PROCEDURE — 99214 OFFICE O/P EST MOD 30 MIN: CPT | Performed by: NURSE PRACTITIONER

## 2019-03-21 NOTE — PROGRESS NOTES
Akash Ch is a 64 y.o. female.   Patient is here today for follow up on her chronic conditions.      Essential hypertension  She is taking her Lisinopril as directed and is tolerating it well. She is having no adverse side effects.       Dyslipidemia  She had been eating healthier, eating less fatty and greasy foods.  Her  travels for his work, and is only home on the weekends and she is the sole care giver for her mother in law, therefore she gets a lot of fast food, or fixes foods that are quick and easy. She has been trying to do better and walk more though.     Acquired hypothyroidism  She takes her Levothyroxine as directed every morning. She feels her dose is correct, and is having no adverse effect    SVT/ATRIAL TACHYCARDIA AND FLUTTER  She had an ablation almost 4 years ago for SVT, and it caused her to flutter. She follows Cardiology for this. She is on Tambocor for this, and her symptoms are much better, but she is still having some flutter issues. She wore a Holter Monitor 3 months ago, and it did not show any abnormal episodes.  She had an echo after that appointment, but it was normal.  She had another episode of heart racing and felt like she was going to faint and had some trouble breathing yesterday and states that she has had some flutters today.  She said that she was told by the cardiologist that she could take an extra pill when she has these episodes, and she did yesterday and it helped her.  She has not called her cardiologist's office since her episode yesterday because she was coming here today.  Her Cardiologist told her she may have to have another ablation.     She was recently in the office for a frontal sinus infection and she says it is better, but she still feels like she has fluid in her ears.  She states that she is also having some seasonal allergies at this time.        The following portions of the patient's history were reviewed and updated as appropriate:  allergies, current medications, past family history, past medical history, past social history, past surgical history and problem list.    Review of Systems   Constitutional: Negative.    HENT: Positive for congestion (ears).    Eyes: Negative.    Respiratory: Positive for shortness of breath (with her episodes of heart racing). Negative for apnea, cough, choking, chest tightness, wheezing and stridor.    Cardiovascular: Positive for palpitations (intermittently).   Gastrointestinal: Negative.    Genitourinary: Negative.    Musculoskeletal: Positive for neck pain.   Skin: Negative.    Allergic/Immunologic: Negative.    Neurological: Positive for syncope (pre syncope) and light-headedness. Negative for dizziness, weakness and numbness.   Hematological: Negative for adenopathy.   Psychiatric/Behavioral: Negative for confusion and suicidal ideas. The patient is not nervous/anxious.      Vitals:    03/21/19 1459   BP: 130/80   Pulse: 65   SpO2: 96%     Objective   Physical Exam   Constitutional: She is oriented to person, place, and time. She appears well-developed and well-nourished. No distress.   HENT:   Head: Normocephalic.   Right Ear: External ear normal. A middle ear effusion is present.   Left Ear: External ear normal. A middle ear effusion is present.   Nose: Nose normal.   Mouth/Throat: Oropharynx is clear and moist. No oropharyngeal exudate.   Eyes: Conjunctivae are normal. Pupils are equal, round, and reactive to light.   Neck: Normal range of motion. Neck supple. No tracheal deviation present. No thyromegaly present.   Cardiovascular: Normal rate, regular rhythm, normal heart sounds and intact distal pulses.   No murmur heard.  Pulmonary/Chest: Effort normal and breath sounds normal. No respiratory distress. She has no wheezes. She has no rales. She exhibits no tenderness.   Abdominal: Soft. Bowel sounds are normal. She exhibits no distension and no mass. There is no hepatosplenomegaly or splenomegaly.  There is no tenderness. There is no rebound and no guarding. No hernia.   Musculoskeletal: Normal range of motion. She exhibits no edema or tenderness.   Lymphadenopathy:     She has no cervical adenopathy.        Right cervical: No superficial cervical, no deep cervical and no posterior cervical adenopathy present.       Left cervical: No superficial cervical, no deep cervical and no posterior cervical adenopathy present.   Neurological: She is alert and oriented to person, place, and time. Coordination and gait normal.   Skin: Skin is warm and dry. No rash noted.   Psychiatric: She has a normal mood and affect. Her behavior is normal. Judgment and thought content normal.       Assessment/Plan   Justina was seen today for follow-up and shortness of breath.    Diagnoses and all orders for this visit:    Essential hypertension  -     Comprehensive Metabolic Panel    Supraventricular tachycardia (CMS/HCC)  -     Comprehensive Metabolic Panel    Palpitations  -     Comprehensive Metabolic Panel    Dyslipidemia    Acquired hypothyroidism  -     Comprehensive Metabolic Panel  -     TSH  -     T4, Free    Seasonal allergic rhinitis, unspecified trigger    Bilateral acute serous otitis media, recurrence not specified      Patient is here today,for follow up on her chronic conditions. She is to continue her medications as directed.     BP at goal on her current medications.    Patient advised to call her Cardiologist tomorrow, to report her episode yesterday, and increased fluttering. She needs to follow up with Cardiology as directed, If symptoms worsen tonight, seek treatment at the ED.    A CMP was ordered today to check for any electrolyte imbalances, that could be associated with her recent flutter episodes.  Will inform patient of these results.      TSH and T4 ordered today to check patient's thyroid function.  Will inform patient of results and adjust meds as needed.        Patient is to continue taking her Zyrtec as  directed for her seasonal allergies and serous otitis. She was advised that it could take up to 8 weeks, for her serous otitis to resolve.    Nutrition and activity goals reviewed including: mainly water to drink, limit white flour/processed sugar, high protein, high fiber carbs, good breakfast, working toward 150 mins cardio per week, resistance training 2x/week.     I will contact patient regarding test results and provide instructions regarding any necessary changes in plan of care.     Patient was encouraged to keep me informed of any acute changes, lack of improvement, or any new concerning symptoms.  I also advised her to go to the ED, if her shortness of breath worsens, or she has an actual syncopal episodes.  Patient voiced understanding of all instructions and denied further questions.    Patient to RTC in 3 months or sooner if needed.

## 2019-03-22 ENCOUNTER — TELEPHONE (OUTPATIENT)
Dept: CARDIOLOGY | Facility: CLINIC | Age: 64
End: 2019-03-22

## 2019-03-22 DIAGNOSIS — I10 ESSENTIAL HYPERTENSION: ICD-10-CM

## 2019-03-22 LAB
ALBUMIN SERPL-MCNC: 3.9 G/DL (ref 3.6–4.8)
ALBUMIN/GLOB SERPL: 1.7 {RATIO} (ref 1.2–2.2)
ALP SERPL-CCNC: 179 IU/L (ref 39–117)
ALT SERPL-CCNC: 31 IU/L (ref 0–32)
AST SERPL-CCNC: 25 IU/L (ref 0–40)
BILIRUB SERPL-MCNC: 0.2 MG/DL (ref 0–1.2)
BUN SERPL-MCNC: 14 MG/DL (ref 8–27)
BUN/CREAT SERPL: 23 (ref 12–28)
CALCIUM SERPL-MCNC: 8.2 MG/DL (ref 8.7–10.3)
CHLORIDE SERPL-SCNC: 104 MMOL/L (ref 96–106)
CO2 SERPL-SCNC: 23 MMOL/L (ref 20–29)
CREAT SERPL-MCNC: 0.61 MG/DL (ref 0.57–1)
GLOBULIN SER CALC-MCNC: 2.3 G/DL (ref 1.5–4.5)
GLUCOSE SERPL-MCNC: 113 MG/DL (ref 65–99)
POTASSIUM SERPL-SCNC: 3.5 MMOL/L (ref 3.5–5.2)
PROT SERPL-MCNC: 6.2 G/DL (ref 6–8.5)
SODIUM SERPL-SCNC: 143 MMOL/L (ref 134–144)
T4 FREE SERPL-MCNC: 1.52 NG/DL (ref 0.82–1.77)
TSH SERPL DL<=0.005 MIU/L-ACNC: 2.91 UIU/ML (ref 0.45–4.5)

## 2019-03-22 RX ORDER — LISINOPRIL 2.5 MG/1
TABLET ORAL
Qty: 30 TABLET | Refills: 0 | Status: SHIPPED | OUTPATIENT
Start: 2019-03-22 | End: 2019-04-16 | Stop reason: SDUPTHER

## 2019-03-22 RX ORDER — ATENOLOL 25 MG/1
TABLET ORAL
Qty: 15 TABLET | Refills: 11 | Status: SHIPPED | OUTPATIENT
Start: 2019-03-22 | End: 2020-04-08

## 2019-03-22 NOTE — TELEPHONE ENCOUNTER
"Per chart review, pt was last seen on 1/28/19, OV note states: \"She says that she does have shortness of breath when she exerts herself more than normal.\"  \"Shortness of breath-stable.\"  \"Hypertension-patient doing very well.\"  Doesn't appear that any med changes were made at appt.           Pt states that she's had a few BP \"episodes\" recently when it's been high. States she gets episodes where she \"can't breathe, feels like I'm going to pass out, and BP is high.\" States she has \"flutters really bad\" and an off and on \"nagging \" AMEZQUITA after her episodes. Pt states that her last episode was about 6 months ago. Pt states BP top # was 187, during last episode. States BP at PCP yesterday was 130 over \"something.\" Pt denies feeling anxious, stressed, or overexerting herself when the episode happened. Pt states she was just sitting in a chair. Pt states that it \"comes out of the blue.\" Pt states that she's not stressed or overly anxious.   Confirmed that pt's px is still [facility] in [place]. Informed pt that Mary is currently seeing patients, but that I will speak w/ her and call back, she verbalized understanding.     "

## 2019-03-22 NOTE — TELEPHONE ENCOUNTER
----- Message from Esperanza Arenas sent at 3/22/2019  8:23 AM EDT -----  Regarding: SOB, HTN  PT IS WANTING TO SPEAK TO YOU ABOUT HER MEDS. HAVING SOB, HTN. HAS GEOVANNY IN April. CALL BACK [phone]

## 2019-03-22 NOTE — TELEPHONE ENCOUNTER
Per Mary, pt has two choices:  1. Increase Flecanide to 1 full tab BID, start taking new dose on Sunday, then come in Tues for nurse visit w/ EKG.   OR  2. Start Atenolol 25mg 1/2 tab daily        Informed pt of the above options, she stated she wants to try the Atenolol and see if that works. Confirmed px and sent in rx.

## 2019-03-26 DIAGNOSIS — I47.1 SVT (SUPRAVENTRICULAR TACHYCARDIA) (HCC): ICD-10-CM

## 2019-03-26 RX ORDER — FLECAINIDE ACETATE 150 MG/1
TABLET ORAL
Qty: 90 TABLET | Refills: 1 | Status: SHIPPED | OUTPATIENT
Start: 2019-03-26 | End: 2019-09-23 | Stop reason: SDUPTHER

## 2019-04-01 ENCOUNTER — OFFICE VISIT (OUTPATIENT)
Dept: FAMILY MEDICINE CLINIC | Facility: CLINIC | Age: 64
End: 2019-04-01

## 2019-04-01 VITALS
DIASTOLIC BLOOD PRESSURE: 75 MMHG | SYSTOLIC BLOOD PRESSURE: 120 MMHG | HEART RATE: 66 BPM | BODY MASS INDEX: 29.95 KG/M2 | TEMPERATURE: 97.3 F | WEIGHT: 186.38 LBS | OXYGEN SATURATION: 96 % | HEIGHT: 66 IN

## 2019-04-01 DIAGNOSIS — L03.012 INFECTION OF NAIL BED OF FINGER OF LEFT HAND: Primary | ICD-10-CM

## 2019-04-01 PROCEDURE — 99214 OFFICE O/P EST MOD 30 MIN: CPT | Performed by: NURSE PRACTITIONER

## 2019-04-01 RX ORDER — CEPHALEXIN 500 MG/1
500 CAPSULE ORAL 3 TIMES DAILY
Qty: 30 CAPSULE | Refills: 0 | Status: SHIPPED | OUTPATIENT
Start: 2019-04-01 | End: 2019-04-11

## 2019-04-01 NOTE — PROGRESS NOTES
Akash Ch is a 64 y.o. female.     Patient is here for acute complaints of left ring finger pain, redness and swelling, surrounding the cuticle and extending to the underside of the nail. This started Saturday morning when she first woke up. She does not know how her cuticle could have gotten infected, because has not had her nails done in over a month. She helped a friend on Thursday, sand some wood floors, in an old house but she wore gloves. She was seen at the [facility] and they looked at her finger and sent her to the ER for an Xray. The xray was normal. She waited for a while, to be seen in the ED, but it was very busy, with a lot of sick people, so she decided to leave and just come to the office today.  She has soaked the hand in hot salt water,  taken ibuprofen and applied neosporin with a bandaid. The finger does feel some better today. She has never had anything like this before.         The following portions of the patient's history were reviewed and updated as appropriate: allergies, current medications, past family history, past medical history, past social history, past surgical history and problem list.    Review of Systems   Constitutional: Negative.    HENT: Negative.    Eyes: Negative.    Respiratory: Negative for apnea, cough, chest tightness, shortness of breath and wheezing.    Gastrointestinal: Negative.    Endocrine: Negative.    Genitourinary: Negative.    Musculoskeletal: Negative.    Skin: Positive for color change. Negative for wound (l).        Left 4th digit, with redness and swelling, around cuticle and palmar surface of the finger   Allergic/Immunologic: Negative.    Neurological: Negative.    Hematological: Negative.    Psychiatric/Behavioral: Negative.      Vitals:    04/01/19 1701   BP: 120/75   Pulse: 66   Temp: 97.3 °F (36.3 °C)   SpO2: 96%     Objective   Physical Exam   Constitutional: She is oriented to person, place, and time. She appears well-developed and  well-nourished. No distress.   HENT:   Head: Normocephalic.   Right Ear: External ear normal.   Left Ear: External ear normal.   Nose: Nose normal.   Mouth/Throat: Oropharynx is clear and moist. No oropharyngeal exudate.   Eyes: Conjunctivae are normal. Pupils are equal, round, and reactive to light.   Neck: Normal range of motion. Neck supple. No tracheal deviation present. No thyromegaly present.   Cardiovascular: Normal rate, regular rhythm, normal heart sounds and intact distal pulses.   No murmur heard.  Pulmonary/Chest: Effort normal and breath sounds normal. No respiratory distress. She has no wheezes. She has no rales. She exhibits no tenderness.   Abdominal: Soft. Bowel sounds are normal. She exhibits no distension and no mass. There is no hepatosplenomegaly or splenomegaly. There is no tenderness. There is no rebound and no guarding. No hernia.   Musculoskeletal: Normal range of motion. She exhibits no edema or tenderness.   Neurological: She is alert and oriented to person, place, and time. Coordination and gait normal.   Skin: Skin is warm and dry. No rash noted.        Psychiatric: She has a normal mood and affect. Her behavior is normal. Judgment and thought content normal.   Nursing note and vitals reviewed.      Assessment/Plan   Justina was seen today for hand pain.    Diagnoses and all orders for this visit:    Infection of nail bed of finger of left hand  -     cephalexin (KEFLEX) 500 MG capsule; Take 1 capsule by mouth 3 (Three) Times a Day for 10 days.  -     mupirocin (BACTROBAN) 2 % ointment; Apply  topically to the appropriate area as directed 3 (Three) Times a Day.    Patient is here today with complaints of erythema and swelling of left 4th digit tip, since Saturday. Instructed patient to soak her left hand in hot water, with gold dial soap, for 15-20 minutes daily then apply Bactroban. She needs to apply Bactroban to the area each night as well and wear bandaide. Keep open to air during the  day time. Keflex 500 mg PO TID for 7-10 days prescribed today.    Patient was encouraged to keep me informed of any acute changes, lack of improvement, or any new concerning symptoms.    Patient voiced understanding of all instructions and denied further questions.    Patient to RTC Thursday, if not improvement.

## 2019-04-16 ENCOUNTER — OFFICE VISIT (OUTPATIENT)
Dept: CARDIOLOGY | Facility: CLINIC | Age: 64
End: 2019-04-16

## 2019-04-16 VITALS
HEART RATE: 69 BPM | HEIGHT: 66 IN | BODY MASS INDEX: 30.12 KG/M2 | DIASTOLIC BLOOD PRESSURE: 63 MMHG | OXYGEN SATURATION: 94 % | WEIGHT: 187.4 LBS | SYSTOLIC BLOOD PRESSURE: 110 MMHG

## 2019-04-16 DIAGNOSIS — R00.2 PALPITATIONS: Primary | ICD-10-CM

## 2019-04-16 DIAGNOSIS — I47.1 SUPRAVENTRICULAR TACHYCARDIA (HCC): ICD-10-CM

## 2019-04-16 DIAGNOSIS — I10 ESSENTIAL HYPERTENSION: ICD-10-CM

## 2019-04-16 DIAGNOSIS — R06.02 SHORTNESS OF BREATH: ICD-10-CM

## 2019-04-16 DIAGNOSIS — I49.1 PAC (PREMATURE ATRIAL CONTRACTION): ICD-10-CM

## 2019-04-16 DIAGNOSIS — R53.83 FATIGUE, UNSPECIFIED TYPE: ICD-10-CM

## 2019-04-16 PROCEDURE — 99213 OFFICE O/P EST LOW 20 MIN: CPT | Performed by: NURSE PRACTITIONER

## 2019-04-16 NOTE — PROGRESS NOTES
Subjective   Justina Ch is a 64 y.o. female     Chief Complaint   Patient presents with   • Follow-up     Pt in office for SoA and HTN    • Palpitations   • Hypertension   • Shortness of Breath       HPI    Problem list:    1. SVT - flecainide therapy  1.1 Event Monitor 5/7-5/20-16 - NSR with PACs and PVCs  1.2  event monitor 11/19-12/2/18-PACs and PVCs  2. Bradycardia  3. Left atrial tachycardia and right atrial isthmus-dependent flutter   3.1 RFA 10/14/2014  4. Grave’s disease, status post ablation with hypothyroidism.   5. Dyslipidemia.   6. Hypertension  6.1 Stress test 7/23/14 - low risk, no ischemia   6.2 Stress Test 7/12/17-no ischemia, low risk  6.3 Echo 7/21/14 - EF 55-60%; DD II; trace MR, TR  6.4 Echo 7/12/17-EF greater than 65%, diastolic dysfunction 1, trace TR, trace MD, PA 20-25  6.5 Echo 10/2/18 - EF 64%; borderline left atrial enlargement   7. Cardiomegaly  7.1 CT Chest 8/5/15 - Cardiomegaly; suspected hepatosplenomegaly with fatty liver  8. Carotid Artery Disease   8.1 Carotid Artery US 10/2/18 - mild atherosclerotic was noted involving the carotid systems; antegrade flow both vertebral arteries.    Patient is a 64-year-old female who presents today for follow-up.  She denies any chest pain or pressure.  She says she has flutters that have increased since her last appointment.  She says they come and go all day.  She says especially if she walks really fast.  Patient says she had an episode recently where she was sitting down and felt like she was going to pass out.  She could not breathe.  She says she feels completely normal once is done.  She felt like this a lot prior to her ablation.  Denies any dizziness, presyncope, syncope, orthopnea PND or edema.  She says that she does have some shortness of breath but only when she has to get to the really hard part of the hill.  Otherwise she is felt well.  Patient has not had a previous stress test scheduled due to the fact that she takes care of  her mother-in-law.    Current Outpatient Medications   Medication Sig Dispense Refill   • aspirin 81 MG tablet Take 1 tablet by mouth daily.     • atenolol (TENORMIN) 25 MG tablet Take 1/2 tab once daily 15 tablet 11   • cetirizine (zyrTEC) 10 MG tablet TAKE 1 TABLET BY MOUTH DAILY 30 tablet 5   • flecainide (TAMBOCOR) 150 MG tablet TAKE 1/2 TABLET BY MOUTH TWICE DAILY 90 tablet 1   • levothyroxine (SYNTHROID, LEVOTHROID) 137 MCG tablet TAKE 1 TABLET BY MOUTH DAILY 90 tablet 0   • lisinopril (PRINIVIL,ZESTRIL) 2.5 MG tablet TAKE 1 TABLET BY MOUTH EVERY DAY 30 tablet 0   • mupirocin (BACTROBAN) 2 % ointment Apply  topically to the appropriate area as directed 3 (Three) Times a Day. 15 g 0   • nitroglycerin (NITROSTAT) 0.4 MG SL tablet 1 under the tongue as needed for angina, may repeat q5mins for up three doses 30 tablet 3   • pravastatin (PRAVACHOL) 40 MG tablet Take 1 tablet by mouth Every Night. 30 tablet 3     No current facility-administered medications for this visit.        ALLERGIES    Patient has no known allergies.    Past Medical History:   Diagnosis Date   • Allergic    • Arrhythmia    • Enlarged heart    • Graves' disease    • Hx of mammogram 2016   • Hyperlipidemia    • Hypertension 2016       Social History     Socioeconomic History   • Marital status:      Spouse name: Not on file   • Number of children: Not on file   • Years of education: Not on file   • Highest education level: Not on file   Tobacco Use   • Smoking status: Former Smoker     Packs/day: 0.50     Years: 25.00     Pack years: 12.50     Types: Cigarettes     Last attempt to quit: 2001     Years since quittin.9   • Smokeless tobacco: Never Used   Substance and Sexual Activity   • Alcohol use: No   • Drug use: No   • Sexual activity: Defer       Family History   Problem Relation Age of Onset   • Hypertension Mother    • Breast cancer Mother    • Bone cancer Mother    • Cancer Mother    • Heart attack Father   "  • Heart disease Father         pacemaker   • Hyperlipidemia Father    • Hypertension Father    • Diabetes Father         Passed away   • Breast cancer Sister    • Ovarian cancer Sister    • Cancer Sister         sister passed away   • Diabetes Paternal Grandmother        Review of Systems   Constitutional: Positive for fatigue. Negative for diaphoresis.   HENT: Negative for congestion, rhinorrhea and sore throat.    Eyes: Positive for visual disturbance (glasses daily ).   Respiratory: Positive for shortness of breath (w/ walking up hill, states \"only the hill part\" ). Negative for chest tightness.    Cardiovascular: Positive for palpitations (States her flutters have increased since last appt. States it's off and on all day; especially if she walks really fast ). Negative for chest pain (Denies CP ) and leg swelling.   Gastrointestinal: Negative for abdominal pain, constipation, diarrhea, nausea and vomiting.   Endocrine: Negative for cold intolerance and heat intolerance.   Genitourinary: Positive for frequency (If she drinks too much at night). Negative for difficulty urinating, dysuria and urgency.   Musculoskeletal: Positive for back pain (mid back ) and myalgias (States she gets cramps in her toes, which causes her toes to buckle up. States it feels like a cramp in her toes ). Negative for arthralgias and neck pain.   Skin: Negative for rash and wound.        Infected cuticle x 3 weeks, has been on abx. States it's still not healed.    Allergic/Immunologic: Positive for environmental allergies (seasonal ). Negative for food allergies.   Neurological: Positive for light-headedness (States she had an episode recently where she was sitting down and felt like she was going to pass out and couldn't breathe. States she feels completely normal once it's done. States she felt like that a lot before ablasion. ) and headaches (States she's had some Ha w/ her light headedness episodes ). Negative for dizziness, " "syncope, weakness and numbness.   Hematological: Does not bruise/bleed easily.   Psychiatric/Behavioral: The patient is nervous/anxious (States she has some days where she feels really stressed out. States \"some days I want to get in my car and just drive off.\" States she's getting more help w/ her mother, so it's not just her anymore ).        Objective   /63   Pulse 69   Ht 167.6 cm (66\")   Wt 85 kg (187 lb 6.4 oz)   SpO2 94%   BMI 30.25 kg/m²   Vitals:    04/16/19 1354   BP: 110/63   Pulse: 69   SpO2: 94%   Weight: 85 kg (187 lb 6.4 oz)   Height: 167.6 cm (66\")      Lab Results (most recent)     None        Physical Exam   Constitutional: She is oriented to person, place, and time. Vital signs are normal. She appears well-developed and well-nourished. She is active and cooperative.   HENT:   Head: Normocephalic.   Eyes: Lids are normal.   Wears glasses    Neck: Normal carotid pulses, no hepatojugular reflux and no JVD present. Carotid bruit is not present.   Cardiovascular: Normal rate, regular rhythm and normal heart sounds.   Pulses:       Radial pulses are 2+ on the right side, and 2+ on the left side.        Dorsalis pedis pulses are 2+ on the right side, and 2+ on the left side.        Posterior tibial pulses are 2+ on the right side, and 2+ on the left side.   No edema BLE.    Pulmonary/Chest: Effort normal and breath sounds normal.   Abdominal: Normal appearance and bowel sounds are normal.   Neurological: She is alert and oriented to person, place, and time.   Skin: Skin is warm, dry and intact.   Psychiatric: She has a normal mood and affect. Her speech is normal and behavior is normal. Judgment and thought content normal. Cognition and memory are normal.       Procedure   Procedures         Assessment/Plan      Diagnosis Plan   1. Palpitations  Stress Test With Myocardial Perfusion One Day    Cardiac Event Monitor   2. Essential hypertension  Stress Test With Myocardial Perfusion One Day    " Cardiac Event Monitor   3. PAC (premature atrial contraction)  Stress Test With Myocardial Perfusion One Day    Cardiac Event Monitor   4. Supraventricular tachycardia (CMS/HCC)  Stress Test With Myocardial Perfusion One Day    Cardiac Event Monitor   5. Shortness of breath  Stress Test With Myocardial Perfusion One Day    Cardiac Event Monitor   6. Fatigue, unspecified type  Stress Test With Myocardial Perfusion One Day    Cardiac Event Monitor       Return in about 10 weeks (around 6/25/2019).  Palpitations/hypertension/PACs/SVT/shortness of breath/fatigue-patient will have a stress test and wear an event monitor for 30 days.  She will continue her medication regimen.  She will follow-up in 10 weeks or sooner if any changes.         Patient's Body mass index is 30.25 kg/m². BMI is above normal parameters. Recommendations include: educational material.      Electronically signed by:

## 2019-04-16 NOTE — PATIENT INSTRUCTIONS
"Fat and Cholesterol Restricted Eating Plan  Getting too much fat and cholesterol in your diet may cause health problems. Choosing the right foods helps keep your fat and cholesterol at normal levels. This can keep you from getting certain diseases.  Your doctor may recommend an eating plan that includes:  · Total fat: ______% or less of total calories a day.  · Saturated fat: ______% or less of total calories a day.  · Cholesterol: less than _________mg a day.  · Fiber: ______g a day.    What are tips for following this plan?  General tips  · Work with your doctor to lose weight if you need to.  · Avoid:  ? Foods with added sugar.  ? Fried foods.  ? Foods with partially hydrogenated oils.  · Limit alcohol intake to no more than 1 drink a day for nonpregnant women and 2 drinks a day for men. One drink equals 12 oz of beer, 5 oz of wine, or 1½ oz of hard liquor.  Reading food labels  · Check food labels for:  ? Trans fats.  ? Partially hydrogenated oils.  ? Saturated fat (g) in each serving.  ? Cholesterol (mg) in each serving.  ? Fiber (g) in each serving.  · Choose foods with healthy fats, such as:  ? Monounsaturated fats.  ? Polyunsaturated fats.  ? Omega-3 fats.  · Choose grain products that have whole grains. Look for the word \"whole\" as the first word in the ingredient list.  Cooking  · Cook foods using low-fat methods. These include baking, boiling, grilling, and broiling.  · Eat more home-cooked foods. Eat at restaurants and buffets less often.  · Avoid cooking using saturated fats, such as butter, cream, palm oil, palm kernel oil, and coconut oil.  Meal planning  · At meals, divide your plate into four equal parts:  ? Fill one-half of your plate with vegetables and green salads.  ? Fill one-fourth of your plate with whole grains.  ? Fill one-fourth of your plate with low-fat (lean) protein foods.  · Eat fish that is high in omega-3 fats at least two times a week. This includes mackerel, tuna, sardines, and " salmon.  · Eat foods that are high in fiber, such as whole grains, beans, apples, broccoli, carrots, peas, and barley.  Recommended foods  Grains  · Whole grains, such as whole wheat or whole grain breads, crackers, cereals, and pasta. Unsweetened oatmeal, bulgur, barley, quinoa, or brown rice. Corn or whole wheat flour tortillas.  Vegetables  · Fresh or frozen vegetables (raw, steamed, roasted, or grilled). Green salads.  Fruits  · All fresh, canned (in natural juice), or frozen fruits.  Meats and other protein foods  · Ground beef (85% or leaner), grass-fed beef, or beef trimmed of fat. Skinless chicken or turkey. Ground chicken or turkey. Pork trimmed of fat. All fish and seafood. Egg whites. Dried beans, peas, or lentils. Unsalted nuts or seeds. Unsalted canned beans. Nut butters without added sugar or oil.  Dairy  · Low-fat or nonfat dairy products, such as skim or 1% milk, 2% or reduced-fat cheeses, low-fat and fat-free ricotta or cottage cheese, or plain low-fat and nonfat yogurt.  Fats and oils  · Tub margarine without trans fats. Light or reduced-fat mayonnaise and salad dressings. Avocado. Olive, canola, sesame, or safflower oils.  The items listed above may not be a complete list of recommended foods or beverages. Contact your dietitian for more options.  Foods to avoid  Grains  · White bread. White pasta. White rice. Cornbread. Bagels, pastries, and croissants. Crackers and snack foods that contain trans fat and hydrogenated oils.  Vegetables  · Vegetables cooked in cheese, cream, or butter sauce. Fried vegetables.  Fruits  · Canned fruit in heavy syrup. Fruit in cream or butter sauce. Fried fruit.  Meats and other protein foods  · Fatty cuts of meat. Ribs, chicken wings, sosa, sausage, bologna, salami, chitterlings, fatback, hot dogs, bratwurst, and packaged lunch meats. Liver and organ meats. Whole eggs and egg yolks. Chicken and turkey with skin. Fried meat.  Dairy  · Whole or 2% milk, cream,  half-and-half, and cream cheese. Whole milk cheeses. Whole-fat or sweetened yogurt. Full-fat cheeses. Nondairy creamers and whipped toppings. Processed cheese, cheese spreads, and cheese curds.  Beverages  · Alcohol. Sugar-sweetened drinks such as sodas, lemonade, and fruit drinks.  Fats and oils  · Butter, stick margarine, lard, shortening, ghee, or sosa fat. Coconut, palm kernel, and palm oils.  Sweets and desserts  · Corn syrup, sugars, honey, and molasses. Candy. Jam and jelly. Syrup. Sweetened cereals. Cookies, pies, cakes, donuts, muffins, and ice cream.  The items listed above may not be a complete list of foods and beverages to avoid. Contact your dietitian for more information.  Summary  · Choosing the right foods helps keep your fat and cholesterol at normal levels. This can keep you from getting certain diseases.  · At meals, fill one-half of your plate with vegetables and green salads.  · Eat high-fiber foods, like whole grains, beans, apples, carrots, peas, and barley.  · Limit added sugar, saturated fats, alcohol, and fried foods.  This information is not intended to replace advice given to you by your health care provider. Make sure you discuss any questions you have with your health care provider.  Document Released: 06/18/2013 Document Revised: 09/04/2018 Document Reviewed: 09/04/2018  [employer] Interactive Patient Education © 2019 [employer]  BMI for Adults  Body mass index (BMI) is a number that is calculated from a person's weight and height. In most adults, the number is used to find how much of an adult's weight is made up of fat. BMI is not as accurate as a direct measure of body fat.  How is BMI calculated?  BMI is calculated by dividing weight in kilograms by height in meters squared. It can also be calculated by dividing weight in pounds by height in inches squared, then multiplying the resulting number by 703. Charts are available to help you find your BMI quickly and easily without  doing this calculation.  How is BMI interpreted?  Health care professionals use BMI charts to identify whether an adult is underweight, at a normal weight, or overweight based on the following guidelines:  · Underweight: BMI less than 18.5.  · Normal weight: BMI between 18.5 and 24.9.  · Overweight: BMI between 25 and 29.9.  · Obese: BMI of 30 and above.    BMI is usually interpreted the same for males and females.  Weight includes both fat and muscle, so someone with a muscular build, such as an athlete, may have a BMI that is higher than 24.9. In cases like these, BMI may not accurately depict body fat. To determine if excess body fat is the cause of a BMI of 25 or higher, further assessments may need to be done by a health care provider.  Why is BMI a useful tool?  BMI is used to identify a possible weight problem that may be related to a medical problem or may increase the risk for medical problems. BMI can also be used to promote changes to reach a healthy weight.  This information is not intended to replace advice given to you by your health care provider. Make sure you discuss any questions you have with your health care provider.  Document Released: 08/29/2005 Document Revised: 04/27/2017 Document Reviewed: 05/15/2015  [employer] Interactive Patient Education © 2018 [employer]    Palpitations  A palpitation is the feeling that your heartbeat is irregular or is faster than normal. It may feel like your heart is fluttering or skipping a beat. Palpitations are usually not a serious problem. They may be caused by many things, including smoking, caffeine, alcohol, stress, and certain medicines. Although most causes of palpitations are not serious, palpitations can be a sign of a serious medical problem. In some cases, you may need further medical evaluation.  Follow these instructions at home:  Pay attention to any changes in your symptoms. Take these actions to help with your condition:  · Avoid the  following:  ? Caffeinated coffee, tea, soft drinks, diet pills, and energy drinks.  ? Chocolate.  ? Alcohol.  · Do not use any tobacco products, such as cigarettes, chewing tobacco, and e-cigarettes. If you need help quitting, ask your health care provider.  · Try to reduce your stress and anxiety. Things that can help you relax include:  ? Yoga.  ? Meditation.  ? Physical activity, such as swimming, jogging, or walking.  ? Biofeedback. This is a method that helps you learn to use your mind to control things in your body, such as your heartbeats.  · Get plenty of rest and sleep.  · Take over-the-counter and prescription medicines only as told by your health care provider.  · Keep all follow-up visits as told by your health care provider. This is important.    Contact a health care provider if:  · You continue to have a fast or irregular heartbeat after 24 hours.  · Your palpitations occur more often.  Get help right away if:  · You have chest pain or shortness of breath.  · You have a severe headache.  · You feel dizzy or you faint.  This information is not intended to replace advice given to you by your health care provider. Make sure you discuss any questions you have with your health care provider.  Document Released: 12/15/2001 Document Revised: 05/22/2017 Document Reviewed: 09/01/2016  [employer] Interactive Patient Education © 2019 [employer]

## 2019-04-17 RX ORDER — LISINOPRIL 2.5 MG/1
TABLET ORAL
Qty: 30 TABLET | Refills: 0 | Status: SHIPPED | OUTPATIENT
Start: 2019-04-17 | End: 2019-05-13 | Stop reason: SDUPTHER

## 2019-04-20 DIAGNOSIS — E03.9 ACQUIRED HYPOTHYROIDISM: ICD-10-CM

## 2019-04-22 RX ORDER — LEVOTHYROXINE SODIUM 137 UG/1
137 TABLET ORAL DAILY
Qty: 90 TABLET | Refills: 0 | Status: SHIPPED | OUTPATIENT
Start: 2019-04-22 | End: 2019-06-28 | Stop reason: SDUPTHER

## 2019-05-13 DIAGNOSIS — I10 ESSENTIAL HYPERTENSION: ICD-10-CM

## 2019-05-13 RX ORDER — LISINOPRIL 2.5 MG/1
TABLET ORAL
Qty: 30 TABLET | Refills: 6 | Status: SHIPPED | OUTPATIENT
Start: 2019-05-13 | End: 2020-01-13

## 2019-05-14 ENCOUNTER — APPOINTMENT (OUTPATIENT)
Dept: CARDIOLOGY | Facility: HOSPITAL | Age: 64
End: 2019-05-14

## 2019-05-30 ENCOUNTER — OFFICE VISIT (OUTPATIENT)
Dept: FAMILY MEDICINE CLINIC | Facility: CLINIC | Age: 64
End: 2019-05-30

## 2019-05-30 VITALS
HEIGHT: 66 IN | OXYGEN SATURATION: 96 % | BODY MASS INDEX: 30.26 KG/M2 | DIASTOLIC BLOOD PRESSURE: 70 MMHG | TEMPERATURE: 97.9 F | HEART RATE: 68 BPM | SYSTOLIC BLOOD PRESSURE: 120 MMHG

## 2019-05-30 DIAGNOSIS — L03.116 CELLULITIS OF LEFT FOOT: ICD-10-CM

## 2019-05-30 DIAGNOSIS — Z09 FOLLOW-UP EXAM: ICD-10-CM

## 2019-05-30 PROCEDURE — 99214 OFFICE O/P EST MOD 30 MIN: CPT | Performed by: NURSE PRACTITIONER

## 2019-05-30 NOTE — PROGRESS NOTES
Akash Ch is a 64 y.o. female.     Patient is here for 1 week follow up on her left foot cellulitis. She developed redness and swelling in her foot, last week, so went to the ED at [facility]. She was diagnosed with cellulitis and given Keflex. Blood work was drawn and she was told that they would contact her if anything was abnormal, and she has not heard anything for them. Her foot is back to normal today, and no cause of the cellulitis was ever found. She has completed her antibiotic.         The following portions of the patient's history were reviewed and updated as appropriate: allergies, current medications, past family history, past medical history, past social history, past surgical history and problem list.    Review of Systems   Constitutional: Negative.    HENT: Negative.    Eyes: Negative.    Respiratory: Negative.    Cardiovascular: Negative.    Gastrointestinal: Negative.    Genitourinary: Negative.    Musculoskeletal: Negative.    Skin: Negative.    Allergic/Immunologic: Negative.    Neurological: Negative for dizziness, syncope, weakness, numbness and headaches.   Hematological: Negative for adenopathy.   Psychiatric/Behavioral: Negative for confusion and suicidal ideas. The patient is not nervous/anxious.      Vitals:    05/30/19 1508   BP: 120/70   Pulse: 68   Temp: 97.9 °F (36.6 °C)   SpO2: 96%     Objective   Physical Exam   Constitutional: She is oriented to person, place, and time. She appears well-developed and well-nourished. No distress.   HENT:   Head: Normocephalic.   Right Ear: External ear normal.   Left Ear: External ear normal.   Nose: Nose normal.   Eyes: Conjunctivae are normal.   Neck: Normal range of motion. Neck supple.   Cardiovascular: Normal rate, regular rhythm, normal heart sounds and intact distal pulses.   No murmur heard.  Pulmonary/Chest: Effort normal and breath sounds normal. No respiratory distress. She has no wheezes. She has no rales. She exhibits no  tenderness.   Abdominal: Soft. Bowel sounds are normal. She exhibits no distension. There is no hepatosplenomegaly or splenomegaly. There is no tenderness. There is no guarding.   Musculoskeletal: Normal range of motion.   Lymphadenopathy:        Right cervical: No superficial cervical, no deep cervical and no posterior cervical adenopathy present.       Left cervical: No superficial cervical, no deep cervical and no posterior cervical adenopathy present.   Neurological: She is alert and oriented to person, place, and time. Coordination and gait normal.   Skin: Skin is warm and dry. No rash noted.   Left foot with no cellulitis noted   Psychiatric: She has a normal mood and affect. Her behavior is normal. Judgment and thought content normal.   Nursing note and vitals reviewed.      Assessment/Plan   Justina was seen today for follow-up.    Diagnoses and all orders for this visit:    Follow-up exam    Cellulitis of left foot       Patient is here today for follow up her left foot cellulitis. She went [facility] ED, last week, for redness and swelling in her left foot. She had a negative Xrays and labs, which have been requested for review, and was given Keflex. She has finished the Keflex and her cellulitis has resolved.    Patient was encouraged to keep me informed of any acute changes, lack of improvement, or any new concerning symptoms. Patient voiced understanding of all instructions and denied further questions.    Patient to RTC for her regular follow up and prn.

## 2019-06-17 RX ORDER — PRAVASTATIN SODIUM 40 MG
40 TABLET ORAL NIGHTLY
Qty: 30 TABLET | Refills: 0 | Status: SHIPPED | OUTPATIENT
Start: 2019-06-17 | End: 2019-06-28 | Stop reason: SDUPTHER

## 2019-06-25 ENCOUNTER — OFFICE VISIT (OUTPATIENT)
Dept: CARDIOLOGY | Facility: CLINIC | Age: 64
End: 2019-06-25

## 2019-06-25 VITALS
BODY MASS INDEX: 29.67 KG/M2 | HEIGHT: 66 IN | DIASTOLIC BLOOD PRESSURE: 63 MMHG | WEIGHT: 184.6 LBS | HEART RATE: 64 BPM | OXYGEN SATURATION: 91 % | SYSTOLIC BLOOD PRESSURE: 105 MMHG

## 2019-06-25 DIAGNOSIS — R00.2 PALPITATIONS: ICD-10-CM

## 2019-06-25 DIAGNOSIS — I47.1 SUPRAVENTRICULAR TACHYCARDIA (HCC): ICD-10-CM

## 2019-06-25 DIAGNOSIS — I10 ESSENTIAL HYPERTENSION: Primary | ICD-10-CM

## 2019-06-25 DIAGNOSIS — R53.83 FATIGUE, UNSPECIFIED TYPE: ICD-10-CM

## 2019-06-25 DIAGNOSIS — I49.3 PVC (PREMATURE VENTRICULAR CONTRACTION): ICD-10-CM

## 2019-06-25 DIAGNOSIS — E78.5 DYSLIPIDEMIA: ICD-10-CM

## 2019-06-25 DIAGNOSIS — I49.1 PAC (PREMATURE ATRIAL CONTRACTION): ICD-10-CM

## 2019-06-25 DIAGNOSIS — R06.02 SHORTNESS OF BREATH: ICD-10-CM

## 2019-06-25 PROCEDURE — 99213 OFFICE O/P EST LOW 20 MIN: CPT | Performed by: NURSE PRACTITIONER

## 2019-06-25 NOTE — PATIENT INSTRUCTIONS
"Fat and Cholesterol Restricted Eating Plan  Getting too much fat and cholesterol in your diet may cause health problems. Choosing the right foods helps keep your fat and cholesterol at normal levels. This can keep you from getting certain diseases.  Your doctor may recommend an eating plan that includes:  · Total fat: ______% or less of total calories a day.  · Saturated fat: ______% or less of total calories a day.  · Cholesterol: less than _________mg a day.  · Fiber: ______g a day.    What are tips for following this plan?  General tips  · Work with your doctor to lose weight if you need to.  · Avoid:  ? Foods with added sugar.  ? Fried foods.  ? Foods with partially hydrogenated oils.  · Limit alcohol intake to no more than 1 drink a day for nonpregnant women and 2 drinks a day for men. One drink equals 12 oz of beer, 5 oz of wine, or 1½ oz of hard liquor.  Reading food labels  · Check food labels for:  ? Trans fats.  ? Partially hydrogenated oils.  ? Saturated fat (g) in each serving.  ? Cholesterol (mg) in each serving.  ? Fiber (g) in each serving.  · Choose foods with healthy fats, such as:  ? Monounsaturated fats.  ? Polyunsaturated fats.  ? Omega-3 fats.  · Choose grain products that have whole grains. Look for the word \"whole\" as the first word in the ingredient list.  Cooking  · Cook foods using low-fat methods. These include baking, boiling, grilling, and broiling.  · Eat more home-cooked foods. Eat at restaurants and buffets less often.  · Avoid cooking using saturated fats, such as butter, cream, palm oil, palm kernel oil, and coconut oil.  Meal planning  · At meals, divide your plate into four equal parts:  ? Fill one-half of your plate with vegetables and green salads.  ? Fill one-fourth of your plate with whole grains.  ? Fill one-fourth of your plate with low-fat (lean) protein foods.  · Eat fish that is high in omega-3 fats at least two times a week. This includes mackerel, tuna, sardines, and " salmon.  · Eat foods that are high in fiber, such as whole grains, beans, apples, broccoli, carrots, peas, and barley.  Recommended foods  Grains  · Whole grains, such as whole wheat or whole grain breads, crackers, cereals, and pasta. Unsweetened oatmeal, bulgur, barley, quinoa, or brown rice. Corn or whole wheat flour tortillas.  Vegetables  · Fresh or frozen vegetables (raw, steamed, roasted, or grilled). Green salads.  Fruits  · All fresh, canned (in natural juice), or frozen fruits.  Meats and other protein foods  · Ground beef (85% or leaner), grass-fed beef, or beef trimmed of fat. Skinless chicken or turkey. Ground chicken or turkey. Pork trimmed of fat. All fish and seafood. Egg whites. Dried beans, peas, or lentils. Unsalted nuts or seeds. Unsalted canned beans. Nut butters without added sugar or oil.  Dairy  · Low-fat or nonfat dairy products, such as skim or 1% milk, 2% or reduced-fat cheeses, low-fat and fat-free ricotta or cottage cheese, or plain low-fat and nonfat yogurt.  Fats and oils  · Tub margarine without trans fats. Light or reduced-fat mayonnaise and salad dressings. Avocado. Olive, canola, sesame, or safflower oils.  The items listed above may not be a complete list of recommended foods or beverages. Contact your dietitian for more options.  Foods to avoid  Grains  · White bread. White pasta. White rice. Cornbread. Bagels, pastries, and croissants. Crackers and snack foods that contain trans fat and hydrogenated oils.  Vegetables  · Vegetables cooked in cheese, cream, or butter sauce. Fried vegetables.  Fruits  · Canned fruit in heavy syrup. Fruit in cream or butter sauce. Fried fruit.  Meats and other protein foods  · Fatty cuts of meat. Ribs, chicken wings, sosa, sausage, bologna, salami, chitterlings, fatback, hot dogs, bratwurst, and packaged lunch meats. Liver and organ meats. Whole eggs and egg yolks. Chicken and turkey with skin. Fried meat.  Dairy  · Whole or 2% milk, cream,  half-and-half, and cream cheese. Whole milk cheeses. Whole-fat or sweetened yogurt. Full-fat cheeses. Nondairy creamers and whipped toppings. Processed cheese, cheese spreads, and cheese curds.  Beverages  · Alcohol. Sugar-sweetened drinks such as sodas, lemonade, and fruit drinks.  Fats and oils  · Butter, stick margarine, lard, shortening, ghee, or sosa fat. Coconut, palm kernel, and palm oils.  Sweets and desserts  · Corn syrup, sugars, honey, and molasses. Candy. Jam and jelly. Syrup. Sweetened cereals. Cookies, pies, cakes, donuts, muffins, and ice cream.  The items listed above may not be a complete list of foods and beverages to avoid. Contact your dietitian for more information.  Summary  · Choosing the right foods helps keep your fat and cholesterol at normal levels. This can keep you from getting certain diseases.  · At meals, fill one-half of your plate with vegetables and green salads.  · Eat high-fiber foods, like whole grains, beans, apples, carrots, peas, and barley.  · Limit added sugar, saturated fats, alcohol, and fried foods.  This information is not intended to replace advice given to you by your health care provider. Make sure you discuss any questions you have with your health care provider.  Document Released: 06/18/2013 Document Revised: 09/04/2018 Document Reviewed: 09/04/2018  [employer] Interactive Patient Education © 2019 [employer]  BMI for Adults  Body mass index (BMI) is a number that is calculated from a person's weight and height. In most adults, the number is used to find how much of an adult's weight is made up of fat. BMI is not as accurate as a direct measure of body fat.  How is BMI calculated?  BMI is calculated by dividing weight in kilograms by height in meters squared. It can also be calculated by dividing weight in pounds by height in inches squared, then multiplying the resulting number by 703. Charts are available to help you find your BMI quickly and easily without  doing this calculation.  How is BMI interpreted?  Health care professionals use BMI charts to identify whether an adult is underweight, at a normal weight, or overweight based on the following guidelines:  · Underweight: BMI less than 18.5.  · Normal weight: BMI between 18.5 and 24.9.  · Overweight: BMI between 25 and 29.9.  · Obese: BMI of 30 and above.    BMI is usually interpreted the same for males and females.  Weight includes both fat and muscle, so someone with a muscular build, such as an athlete, may have a BMI that is higher than 24.9. In cases like these, BMI may not accurately depict body fat. To determine if excess body fat is the cause of a BMI of 25 or higher, further assessments may need to be done by a health care provider.  Why is BMI a useful tool?  BMI is used to identify a possible weight problem that may be related to a medical problem or may increase the risk for medical problems. BMI can also be used to promote changes to reach a healthy weight.  This information is not intended to replace advice given to you by your health care provider. Make sure you discuss any questions you have with your health care provider.  Document Released: 08/29/2005 Document Revised: 04/27/2017 Document Reviewed: 05/15/2015  [employer] Interactive Patient Education © 2018 [employer]

## 2019-06-25 NOTE — PROGRESS NOTES
Akash Ch is a 64 y.o. female     Chief Complaint   Patient presents with   • Follow-up       HPI    Problem list:    1. SVT - flecainide therapy  1.1 Event Monitor -- - NSR with PACs and PVCs  1.2  event monitor -18-PACs and PVCs  2. Bradycardia  3. Left atrial tachycardia and right atrial isthmus-dependent flutter   3.1 RFA 10/14/2014  4. Grave’s disease, status post ablation with hypothyroidism.   5. Dyslipidemia.   6. Hypertension  6.1 Stress test 14 - low risk, no ischemia   6.2 Stress Test 17-no ischemia, low risk  6.3 Echo 14 - EF 55-60%; DD II; trace MR, TR  6.4 Echo 17-EF greater than 65%, diastolic dysfunction 1, trace TR, trace AR, PA 20-25  6.5 Echo 10/2/18 - EF 64%; borderline left atrial enlargement   7. Cardiomegaly  7.1 CT Chest 8/5/15 - Cardiomegaly; suspected hepatosplenomegaly with fatty liver  8. Carotid Artery Disease   8.1 Carotid Artery US 10/2/18 - mild atherosclerotic was noted involving the carotid systems; antegrade flow both vertebral arteries.    Patient is a 64-year-old female who presents today for follow-up.  She denies any chest pain or pressure.  She says her palpitations have improved since last visit as she has less stress.  Her mother-in-law and as the caregiver 40 hours a week.  She denies any dizziness, presyncope, syncope, orthopnea, PND or edema.  She does still have shortness of breath with activity as well as fatigue.  She would like to have her stress test rescheduled so that she can have it now that she is been less stressed and busy.  Patient's 19-year-old nephew  in a car accident last week.    Current Outpatient Medications   Medication Sig Dispense Refill   • aspirin 81 MG tablet Take 1 tablet by mouth daily.     • atenolol (TENORMIN) 25 MG tablet Take 1/2 tab once daily 15 tablet 11   • cetirizine (zyrTEC) 10 MG tablet TAKE 1 TABLET BY MOUTH DAILY 30 tablet 5   • flecainide (TAMBOCOR) 150 MG tablet TAKE 1/2  TABLET BY MOUTH TWICE DAILY 90 tablet 1   • levothyroxine (SYNTHROID, LEVOTHROID) 137 MCG tablet TAKE 1 TABLET BY MOUTH DAILY 90 tablet 0   • lisinopril (PRINIVIL,ZESTRIL) 2.5 MG tablet TAKE 1 TABLET BY MOUTH EVERY DAY 30 tablet 6   • mupirocin (BACTROBAN) 2 % ointment Apply  topically to the appropriate area as directed 3 (Three) Times a Day. 15 g 0   • nitroglycerin (NITROSTAT) 0.4 MG SL tablet 1 under the tongue as needed for angina, may repeat q5mins for up three doses 30 tablet 3   • pravastatin (PRAVACHOL) 40 MG tablet TAKE 1 TABLET BY MOUTH EVERY NIGHT 30 tablet 0     No current facility-administered medications for this visit.        ALLERGIES    Patient has no known allergies.    Past Medical History:   Diagnosis Date   • Allergic    • Arrhythmia    • Enlarged heart    • Graves' disease    • Hx of mammogram 2016   • Hx of staphylococcal infection    • Hyperlipidemia    • Hypertension 2016       Social History     Socioeconomic History   • Marital status:      Spouse name: Not on file   • Number of children: Not on file   • Years of education: Not on file   • Highest education level: Not on file   Tobacco Use   • Smoking status: Former Smoker     Packs/day: 0.50     Years: 25.00     Pack years: 12.50     Types: Cigarettes     Last attempt to quit: 2001     Years since quittin.1   • Smokeless tobacco: Never Used   Substance and Sexual Activity   • Alcohol use: No   • Drug use: No   • Sexual activity: Defer       Family History   Problem Relation Age of Onset   • Hypertension Mother    • Breast cancer Mother    • Bone cancer Mother    • Cancer Mother    • Heart attack Father    • Heart disease Father         pacemaker   • Hyperlipidemia Father    • Hypertension Father    • Diabetes Father         Passed away   • Breast cancer Sister    • Ovarian cancer Sister    • Cancer Sister         sister passed away   • Diabetes Paternal Grandmother        Review of Systems  "  Constitutional: Positive for fatigue. Negative for diaphoresis.   HENT: Negative for congestion, rhinorrhea and sore throat.    Eyes: Positive for visual disturbance (glasses daily ).   Respiratory: Positive for shortness of breath (w/ activity. States she's been working on being more active: daily walks, treadmill, etc ). Negative for chest tightness.    Cardiovascular: Positive for palpitations (Has some, improved since last visit; less stress ). Negative for chest pain (Denies CP ) and leg swelling.   Gastrointestinal: Negative for abdominal pain, blood in stool, constipation, diarrhea, nausea and vomiting.   Endocrine: Negative for cold intolerance and heat intolerance.   Genitourinary: Positive for frequency (Mainly at HS ). Negative for difficulty urinating, dysuria, hematuria and urgency.   Musculoskeletal: Positive for back pain (Improved since last visit, still some ). Negative for arthralgias and neck pain.   Skin: Negative for rash and wound.   Allergic/Immunologic: Positive for environmental allergies (seasonal ). Negative for food allergies.   Neurological: Negative for dizziness, syncope, weakness, light-headedness, numbness and headaches.   Hematological: Bruises/bleeds easily.   Psychiatric/Behavioral: Positive for sleep disturbance (Pt states she struggles to stay asleep. ).        Pt states that her stress has decreased a lot        Objective   /63   Pulse 64   Ht 167.6 cm (66\")   Wt 83.7 kg (184 lb 9.6 oz)   SpO2 91%   BMI 29.80 kg/m²   Vitals:    06/25/19 1339   BP: 105/63   Pulse: 64   SpO2: 91%   Weight: 83.7 kg (184 lb 9.6 oz)   Height: 167.6 cm (66\")      Lab Results (most recent)     None        Physical Exam   Constitutional: She is oriented to person, place, and time. Vital signs are normal. She appears well-developed and well-nourished. She is active and cooperative.   HENT:   Head: Normocephalic.   Eyes: Lids are normal.   Wears glasses    Neck: Normal carotid pulses, no " hepatojugular reflux and no JVD present. Carotid bruit is not present.   Cardiovascular: Normal rate, regular rhythm and normal heart sounds.   Pulses:       Radial pulses are 2+ on the right side, and 2+ on the left side.        Dorsalis pedis pulses are 2+ on the right side, and 2+ on the left side.        Posterior tibial pulses are 2+ on the right side, and 2+ on the left side.   No edema BLE.    Pulmonary/Chest: Effort normal and breath sounds normal.   Abdominal: Normal appearance and bowel sounds are normal.   Neurological: She is alert and oriented to person, place, and time.   Skin: Skin is warm, dry and intact.   Psychiatric: She has a normal mood and affect. Her speech is normal and behavior is normal. Judgment and thought content normal. Cognition and memory are normal.       Procedure   Procedures         Assessment/Plan      Diagnosis Plan   1. Essential hypertension  Stress Test With Myocardial Perfusion One Day   2. Palpitations  Stress Test With Myocardial Perfusion One Day   3. Supraventricular tachycardia (CMS/HCC)  Stress Test With Myocardial Perfusion One Day   4. Shortness of breath  Stress Test With Myocardial Perfusion One Day   5. Dyslipidemia  Stress Test With Myocardial Perfusion One Day   6. PVC (premature ventricular contraction)  Stress Test With Myocardial Perfusion One Day       Return in about 12 weeks (around 9/17/2019).    Hypertension/palpitations/SVT/shortness of breath/PVCs/dyslipidemia-patient will have reschedule stress test.  She will continue her atenolol and flecainide for her palpitations and hypertension as well as lisinopril..  She will continue her pravastatin for her hyperlipidemia.  She will continue her medication regimen.  She will follow-up in 12 weeks or sooner if any changes.       Patient's Body mass index is 29.8 kg/m². BMI is above normal parameters. Recommendations include: educational material.      Electronically signed by:

## 2019-06-28 ENCOUNTER — OFFICE VISIT (OUTPATIENT)
Dept: FAMILY MEDICINE CLINIC | Facility: CLINIC | Age: 64
End: 2019-06-28

## 2019-06-28 ENCOUNTER — RESULTS ENCOUNTER (OUTPATIENT)
Dept: FAMILY MEDICINE CLINIC | Facility: CLINIC | Age: 64
End: 2019-06-28

## 2019-06-28 VITALS
SYSTOLIC BLOOD PRESSURE: 100 MMHG | DIASTOLIC BLOOD PRESSURE: 60 MMHG | WEIGHT: 185.38 LBS | HEART RATE: 66 BPM | HEIGHT: 66 IN | BODY MASS INDEX: 29.79 KG/M2 | OXYGEN SATURATION: 94 % | TEMPERATURE: 98.5 F

## 2019-06-28 DIAGNOSIS — I10 ESSENTIAL HYPERTENSION: ICD-10-CM

## 2019-06-28 DIAGNOSIS — E78.5 DYSLIPIDEMIA: ICD-10-CM

## 2019-06-28 DIAGNOSIS — Z12.11 SCREENING FOR COLON CANCER: ICD-10-CM

## 2019-06-28 DIAGNOSIS — R00.2 PALPITATIONS: ICD-10-CM

## 2019-06-28 DIAGNOSIS — E03.9 ACQUIRED HYPOTHYROIDISM: ICD-10-CM

## 2019-06-28 DIAGNOSIS — I47.1 SUPRAVENTRICULAR TACHYCARDIA (HCC): ICD-10-CM

## 2019-06-28 PROCEDURE — 99214 OFFICE O/P EST MOD 30 MIN: CPT | Performed by: NURSE PRACTITIONER

## 2019-06-28 RX ORDER — PRAVASTATIN SODIUM 40 MG
40 TABLET ORAL NIGHTLY
Qty: 90 TABLET | Refills: 1 | Status: SHIPPED | OUTPATIENT
Start: 2019-06-28 | End: 2020-01-13

## 2019-06-28 RX ORDER — LEVOTHYROXINE SODIUM 137 UG/1
137 TABLET ORAL DAILY
Qty: 90 TABLET | Refills: 1 | Status: SHIPPED | OUTPATIENT
Start: 2019-06-28 | End: 2019-12-30

## 2019-06-28 NOTE — PROGRESS NOTES
Akash Ch is a 64 y.o. female.   Patient is here today for follow up on her chronic conditions.      Essential hypertension  She is taking her Lisinopril as directed and is tolerating it well. She is having no adverse side effects.       Dyslipidemia  She had been eating healthier, eating less fatty and greasy foods.  Her  travels for his work, and is only home on the weekends and she has been the sole care giver for her mother in law, therefore she was getting a lot of fast food, and fixing foods that are quick and easy. Her sister is helping out now with her mother in law though, so she will be able to do better. She has been trying to do better and walk more.     Acquired hypothyroidism  She takes her Levothyroxine as directed every morning. She feels her dose is correct, and is having no adverse effect    SVT/ATRIAL TACHYCARDIA AND FLUTTER  She had an ablation almost 4 years ago for SVT, and it caused her to flutter. She follows Cardiology for this. She is on Tambocor for this, and her symptoms are much better, but she is still having some flutter issues. She saw Cardiology Tuesday and everything was good.  She is having a stress test next month, just an evaluation.           The following portions of the patient's history were reviewed and updated as appropriate: allergies, current medications, past family history, past medical history, past social history, past surgical history and problem list.    Review of Systems   Constitutional: Negative.    HENT: Negative.  Congestion: ears.    Eyes: Negative.    Respiratory: Negative for apnea, cough, choking, chest tightness, shortness of breath (with her episodes of heart racing), wheezing and stridor.    Cardiovascular: Positive for palpitations (intermittently). Negative for chest pain and leg swelling.   Gastrointestinal: Negative.    Endocrine: Negative.    Genitourinary: Negative.    Musculoskeletal: Positive for arthralgias (arthritis ).  Negative for back pain, gait problem, joint swelling, myalgias, neck pain and neck stiffness.   Skin: Negative.    Allergic/Immunologic: Negative.    Neurological: Negative.  Negative for dizziness, syncope (pre syncope), weakness, light-headedness and numbness.   Hematological: Negative for adenopathy. Bruises/bleeds easily.   Psychiatric/Behavioral: Negative for agitation, confusion, dysphoric mood and suicidal ideas. The patient is not nervous/anxious.      Vitals:    06/28/19 1448   BP: 100/60   Pulse: 66   Temp: 98.5 °F (36.9 °C)   SpO2: 94%     Objective   Physical Exam   Constitutional: She is oriented to person, place, and time. She appears well-developed and well-nourished. No distress.   HENT:   Head: Normocephalic.   Right Ear: External ear normal.   Left Ear: External ear normal.   Nose: Nose normal.   Mouth/Throat: Oropharynx is clear and moist. No oropharyngeal exudate.   Eyes: Conjunctivae are normal. Pupils are equal, round, and reactive to light.   Neck: Normal range of motion. Neck supple. No tracheal deviation present. No thyromegaly present.   Cardiovascular: Normal rate, regular rhythm, normal heart sounds and intact distal pulses.   No murmur heard.  Pulmonary/Chest: Effort normal and breath sounds normal. No respiratory distress. She has no wheezes. She has no rales. She exhibits no tenderness.   Abdominal: Soft. Bowel sounds are normal. She exhibits no distension and no mass. There is no hepatosplenomegaly or splenomegaly. There is no tenderness. There is no rebound and no guarding. No hernia.   Musculoskeletal: Normal range of motion. She exhibits no edema or tenderness.   Lymphadenopathy:     She has no cervical adenopathy.        Right cervical: No superficial cervical, no deep cervical and no posterior cervical adenopathy present.       Left cervical: No superficial cervical, no deep cervical and no posterior cervical adenopathy present.   Neurological: She is alert and oriented to  person, place, and time. Coordination and gait normal.   Skin: Skin is warm and dry. No rash noted.   Psychiatric: She has a normal mood and affect. Her behavior is normal. Judgment and thought content normal.       Assessment/Plan   Justina was seen today for hypertension.    Diagnoses and all orders for this visit:    Essential hypertension  -     Comprehensive Metabolic Panel    Dyslipidemia  -     pravastatin (PRAVACHOL) 40 MG tablet; Take 1 tablet by mouth Every Night.  -     Comprehensive Metabolic Panel    Supraventricular tachycardia (CMS/HCC)  -     Comprehensive Metabolic Panel    Palpitations  -     Comprehensive Metabolic Panel    Acquired hypothyroidism  -     levothyroxine (SYNTHROID, LEVOTHROID) 137 MCG tablet; Take 1 tablet by mouth Daily.  -     TSH  -     T4, Free    Screening for colon cancer  -     Cologuard - Stool, Per Rectum; Future      Patient is here today,for follow up on her chronic conditions. She is to continue her medications as directed.     BP at goal on her current medications.    Continue Pravachol as directed. Nutrition and activity goals reviewed including: mainly water to drink, limit white flour/processed sugar, high protein, high fiber carbs, good breakfast, working toward 150 mins cardio per week, resistance training 2x/week.    She needs to follow up with Cardiology as directed, If symptoms seek treatment at the ED. She follows Cardiology every 3 months.     A CMP was ordered today to check for any electrolyte imbalances, that could be associated with her recent flutter episodes.  Will inform patient of these results.      TSH and T4 ordered today to check patient's thyroid function.  Will inform patient of results and adjust meds as needed.        Patient is to continue taking her Zyrtec as directed for her seasonal allergies and serous otitis. She was advised that it could take up to 8 weeks, for her serous otitis to resolve.    Nutrition and activity goals reviewed  including: mainly water to drink, limit white flour/processed sugar, high protein, high fiber carbs, good breakfast, working toward 150 mins cardio per week, resistance training 2x/week.     I will contact patient regarding test results and provide instructions regarding any necessary changes in plan of care.     Patient was encouraged to keep me informed of any acute changes, lack of improvement, or any new concerning symptoms.  I also advised her to go to the ED, if her shortness of breath worsens, or she has an actual syncopal episodes.  Patient voiced understanding of all instructions and denied further questions.    Patient to RTC in 6 months or sooner if needed.

## 2019-06-29 LAB
ALBUMIN SERPL-MCNC: 4.1 G/DL (ref 3.6–4.8)
ALBUMIN/GLOB SERPL: 1.8 {RATIO} (ref 1.2–2.2)
ALP SERPL-CCNC: 153 IU/L (ref 39–117)
ALT SERPL-CCNC: 31 IU/L (ref 0–32)
AST SERPL-CCNC: 32 IU/L (ref 0–40)
BILIRUB SERPL-MCNC: 0.3 MG/DL (ref 0–1.2)
BUN SERPL-MCNC: 15 MG/DL (ref 8–27)
BUN/CREAT SERPL: 18 (ref 12–28)
CALCIUM SERPL-MCNC: 9.1 MG/DL (ref 8.7–10.3)
CHLORIDE SERPL-SCNC: 105 MMOL/L (ref 96–106)
CO2 SERPL-SCNC: 23 MMOL/L (ref 20–29)
CREAT SERPL-MCNC: 0.82 MG/DL (ref 0.57–1)
GLOBULIN SER CALC-MCNC: 2.3 G/DL (ref 1.5–4.5)
GLUCOSE SERPL-MCNC: 108 MG/DL (ref 65–99)
POTASSIUM SERPL-SCNC: 3.6 MMOL/L (ref 3.5–5.2)
PROT SERPL-MCNC: 6.4 G/DL (ref 6–8.5)
SODIUM SERPL-SCNC: 141 MMOL/L (ref 134–144)
T4 FREE SERPL-MCNC: 1.7 NG/DL (ref 0.82–1.77)
TSH SERPL DL<=0.005 MIU/L-ACNC: 0.28 UIU/ML (ref 0.45–4.5)

## 2019-06-30 DIAGNOSIS — E03.9 ACQUIRED HYPOTHYROIDISM: Primary | ICD-10-CM

## 2019-06-30 DIAGNOSIS — R79.89 LOW TSH LEVEL: ICD-10-CM

## 2019-07-05 ENCOUNTER — RESULTS ENCOUNTER (OUTPATIENT)
Dept: FAMILY MEDICINE CLINIC | Facility: CLINIC | Age: 64
End: 2019-07-05

## 2019-07-05 DIAGNOSIS — R79.89 LOW TSH LEVEL: ICD-10-CM

## 2019-07-05 DIAGNOSIS — E03.9 ACQUIRED HYPOTHYROIDISM: ICD-10-CM

## 2019-07-23 ENCOUNTER — TELEPHONE (OUTPATIENT)
Dept: FAMILY MEDICINE CLINIC | Facility: CLINIC | Age: 64
End: 2019-07-23

## 2019-07-23 NOTE — TELEPHONE ENCOUNTER
Patient left a message requesting the results from her resent cologuard.      I went to cologuards website to get the results and this is what was found.      Test Result No Result Obtained  Comments There was insufficient stool DNA detected to produce a valid Cologuard result. The patient will be contacted to initiate a new sample collection.

## 2019-08-05 ENCOUNTER — HOSPITAL ENCOUNTER (OUTPATIENT)
Dept: CARDIOLOGY | Facility: HOSPITAL | Age: 64
Discharge: HOME | End: 2019-08-05

## 2019-08-05 DIAGNOSIS — R06.02 SHORTNESS OF BREATH: ICD-10-CM

## 2019-08-05 DIAGNOSIS — E78.5 DYSLIPIDEMIA: ICD-10-CM

## 2019-08-05 DIAGNOSIS — I47.1 SUPRAVENTRICULAR TACHYCARDIA (HCC): ICD-10-CM

## 2019-08-05 DIAGNOSIS — R00.2 PALPITATIONS: ICD-10-CM

## 2019-08-05 DIAGNOSIS — I10 ESSENTIAL HYPERTENSION: ICD-10-CM

## 2019-08-05 DIAGNOSIS — I49.3 PVC (PREMATURE VENTRICULAR CONTRACTION): ICD-10-CM

## 2019-08-05 LAB
BH CV NUCLEAR PRIOR STUDY: 3
BH CV STRESS COMMENTS STAGE 1: NORMAL
BH CV STRESS DOSE REGADENOSON STAGE 1: 0.4
BH CV STRESS DURATION MIN STAGE 1: 0
BH CV STRESS DURATION SEC STAGE 1: 10
BH CV STRESS PROTOCOL 1: NORMAL
BH CV STRESS RECOVERY BP: NORMAL MMHG
BH CV STRESS RECOVERY HR: 71 BPM
BH CV STRESS STAGE 1: 1
MAXIMAL PREDICTED HEART RATE: 158 BPM
PERCENT MAX PREDICTED HR: 97.47 %
STRESS BASELINE BP: NORMAL MMHG
STRESS BASELINE HR: 62 BPM
STRESS PERCENT HR: 115 %
STRESS POST ESTIMATED WORKLOAD: 10.1 METS
STRESS POST EXERCISE DUR MIN: 7 MIN
STRESS POST EXERCISE DUR SEC: 30 SEC
STRESS POST PEAK BP: NORMAL MMHG
STRESS POST PEAK HR: 154 BPM
STRESS TARGET HR: 134 BPM

## 2019-08-05 PROCEDURE — 78452 HT MUSCLE IMAGE SPECT MULT: CPT | Performed by: INTERNAL MEDICINE

## 2019-08-05 PROCEDURE — 93018 CV STRESS TEST I&R ONLY: CPT | Performed by: INTERNAL MEDICINE

## 2019-08-05 PROCEDURE — 78452 HT MUSCLE IMAGE SPECT MULT: CPT

## 2019-08-05 PROCEDURE — A9500 TC99M SESTAMIBI: HCPCS | Performed by: INTERNAL MEDICINE

## 2019-08-05 PROCEDURE — 93017 CV STRESS TEST TRACING ONLY: CPT

## 2019-08-05 PROCEDURE — 0 TECHNETIUM SESTAMIBI: Performed by: INTERNAL MEDICINE

## 2019-08-05 RX ADMIN — TECHNETIUM TC 99M SESTAMIBI 1 DOSE: 1 INJECTION INTRAVENOUS at 12:09

## 2019-08-05 RX ADMIN — TECHNETIUM TC 99M SESTAMIBI 1 DOSE: 1 INJECTION INTRAVENOUS at 11:51

## 2019-08-06 ENCOUNTER — TELEPHONE (OUTPATIENT)
Dept: CARDIOLOGY | Facility: CLINIC | Age: 64
End: 2019-08-06

## 2019-08-11 DIAGNOSIS — J30.2 SEASONAL ALLERGIC RHINITIS: ICD-10-CM

## 2019-08-11 DIAGNOSIS — R09.82 PND (POST-NASAL DRIP): ICD-10-CM

## 2019-08-12 RX ORDER — CETIRIZINE HYDROCHLORIDE 10 MG/1
10 TABLET ORAL DAILY
Qty: 30 TABLET | Refills: 0 | Status: SHIPPED | OUTPATIENT
Start: 2019-08-12 | End: 2019-09-13 | Stop reason: SDUPTHER

## 2019-08-14 ENCOUNTER — TELEPHONE (OUTPATIENT)
Dept: FAMILY MEDICINE CLINIC | Facility: CLINIC | Age: 64
End: 2019-08-14

## 2019-08-14 NOTE — TELEPHONE ENCOUNTER
Pt called this morning re: beto results.  Sts that she has submitted a second sample and was notified by [facility] that we should have her new result. Pt sts that she called about this yesterday as well. Please advise.

## 2019-09-13 DIAGNOSIS — J30.2 SEASONAL ALLERGIC RHINITIS: ICD-10-CM

## 2019-09-13 DIAGNOSIS — R09.82 PND (POST-NASAL DRIP): ICD-10-CM

## 2019-09-13 RX ORDER — CETIRIZINE HYDROCHLORIDE 10 MG/1
10 TABLET ORAL DAILY
Qty: 30 TABLET | Refills: 2 | Status: SHIPPED | OUTPATIENT
Start: 2019-09-13 | End: 2019-12-10 | Stop reason: SDUPTHER

## 2019-09-23 DIAGNOSIS — I47.1 SVT (SUPRAVENTRICULAR TACHYCARDIA) (HCC): ICD-10-CM

## 2019-09-23 RX ORDER — FLECAINIDE ACETATE 150 MG/1
TABLET ORAL
Qty: 90 TABLET | Refills: 1 | Status: SHIPPED | OUTPATIENT
Start: 2019-09-23 | End: 2020-02-28 | Stop reason: DRUGHIGH

## 2019-10-03 ENCOUNTER — LAB (OUTPATIENT)
Dept: CARDIOLOGY | Facility: CLINIC | Age: 64
End: 2019-10-03

## 2019-10-03 ENCOUNTER — OFFICE VISIT (OUTPATIENT)
Dept: CARDIOLOGY | Facility: CLINIC | Age: 64
End: 2019-10-03

## 2019-10-03 VITALS
SYSTOLIC BLOOD PRESSURE: 116 MMHG | HEIGHT: 66 IN | DIASTOLIC BLOOD PRESSURE: 75 MMHG | OXYGEN SATURATION: 94 % | WEIGHT: 179.8 LBS | HEART RATE: 62 BPM | BODY MASS INDEX: 28.9 KG/M2

## 2019-10-03 DIAGNOSIS — R06.02 SHORTNESS OF BREATH: ICD-10-CM

## 2019-10-03 DIAGNOSIS — I49.1 PAC (PREMATURE ATRIAL CONTRACTION): ICD-10-CM

## 2019-10-03 DIAGNOSIS — I47.1 SUPRAVENTRICULAR TACHYCARDIA (HCC): ICD-10-CM

## 2019-10-03 DIAGNOSIS — Z00.00 HEALTHCARE MAINTENANCE: ICD-10-CM

## 2019-10-03 DIAGNOSIS — E78.5 DYSLIPIDEMIA: ICD-10-CM

## 2019-10-03 DIAGNOSIS — R00.2 PALPITATIONS: ICD-10-CM

## 2019-10-03 DIAGNOSIS — I10 ESSENTIAL HYPERTENSION: Primary | ICD-10-CM

## 2019-10-03 LAB
T4 FREE SERPL-MCNC: 1.54 NG/DL (ref 0.93–1.7)
TSH SERPL DL<=0.05 MIU/L-ACNC: 1.14 UIU/ML (ref 0.27–4.2)

## 2019-10-03 PROCEDURE — 84481 FREE ASSAY (FT-3): CPT | Performed by: NURSE PRACTITIONER

## 2019-10-03 PROCEDURE — 99213 OFFICE O/P EST LOW 20 MIN: CPT | Performed by: NURSE PRACTITIONER

## 2019-10-03 PROCEDURE — 36415 COLL VENOUS BLD VENIPUNCTURE: CPT

## 2019-10-03 PROCEDURE — 84443 ASSAY THYROID STIM HORMONE: CPT | Performed by: NURSE PRACTITIONER

## 2019-10-03 PROCEDURE — 93000 ELECTROCARDIOGRAM COMPLETE: CPT | Performed by: NURSE PRACTITIONER

## 2019-10-03 PROCEDURE — 84439 ASSAY OF FREE THYROXINE: CPT | Performed by: NURSE PRACTITIONER

## 2019-10-03 NOTE — PATIENT INSTRUCTIONS
"Fat and Cholesterol Restricted Eating Plan  Getting too much fat and cholesterol in your diet may cause health problems. Choosing the right foods helps keep your fat and cholesterol at normal levels. This can keep you from getting certain diseases.  Your doctor may recommend an eating plan that includes:  · Total fat: ______% or less of total calories a day.  · Saturated fat: ______% or less of total calories a day.  · Cholesterol: less than _________mg a day.  · Fiber: ______g a day.  What are tips for following this plan?  General tips    · Work with your doctor to lose weight if you need to.  · Avoid:  ? Foods with added sugar.  ? Fried foods.  ? Foods with partially hydrogenated oils.  · Limit alcohol intake to no more than 1 drink a day for nonpregnant women and 2 drinks a day for men. One drink equals 12 oz of beer, 5 oz of wine, or 1½ oz of hard liquor.  Reading food labels  · Check food labels for:  ? Trans fats.  ? Partially hydrogenated oils.  ? Saturated fat (g) in each serving.  ? Cholesterol (mg) in each serving.  ? Fiber (g) in each serving.  · Choose foods with healthy fats, such as:  ? Monounsaturated fats.  ? Polyunsaturated fats.  ? Omega-3 fats.  · Choose grain products that have whole grains. Look for the word \"whole\" as the first word in the ingredient list.  Cooking  · Cook foods using low-fat methods. These include baking, boiling, grilling, and broiling.  · Eat more home-cooked foods. Eat at restaurants and buffets less often.  · Avoid cooking using saturated fats, such as butter, cream, palm oil, palm kernel oil, and coconut oil.  Meal planning    · At meals, divide your plate into four equal parts:  ? Fill one-half of your plate with vegetables and green salads.  ? Fill one-fourth of your plate with whole grains.  ? Fill one-fourth of your plate with low-fat (lean) protein foods.  · Eat fish that is high in omega-3 fats at least two times a week. This includes mackerel, tuna, sardines, and " salmon.  · Eat foods that are high in fiber, such as whole grains, beans, apples, broccoli, carrots, peas, and barley.  Recommended foods  Grains  · Whole grains, such as whole wheat or whole grain breads, crackers, cereals, and pasta. Unsweetened oatmeal, bulgur, barley, quinoa, or brown rice. Corn or whole wheat flour tortillas.  Vegetables  · Fresh or frozen vegetables (raw, steamed, roasted, or grilled). Green salads.  Fruits  · All fresh, canned (in natural juice), or frozen fruits.  Meats and other protein foods  · Ground beef (85% or leaner), grass-fed beef, or beef trimmed of fat. Skinless chicken or turkey. Ground chicken or turkey. Pork trimmed of fat. All fish and seafood. Egg whites. Dried beans, peas, or lentils. Unsalted nuts or seeds. Unsalted canned beans. Nut butters without added sugar or oil.  Dairy  · Low-fat or nonfat dairy products, such as skim or 1% milk, 2% or reduced-fat cheeses, low-fat and fat-free ricotta or cottage cheese, or plain low-fat and nonfat yogurt.  Fats and oils  · Tub margarine without trans fats. Light or reduced-fat mayonnaise and salad dressings. Avocado. Olive, canola, sesame, or safflower oils.  The items listed above may not be a complete list of recommended foods or beverages. Contact your dietitian for more options.  The items listed above may not be a complete list of foods and beverages [you/your child] can eat. Contact a dietitian for more information.  Foods to avoid  Grains  · White bread. White pasta. White rice. Cornbread. Bagels, pastries, and croissants. Crackers and snack foods that contain trans fat and hydrogenated oils.  Vegetables  · Vegetables cooked in cheese, cream, or butter sauce. Fried vegetables.  Fruits  · Canned fruit in heavy syrup. Fruit in cream or butter sauce. Fried fruit.  Meats and other protein foods  · Fatty cuts of meat. Ribs, chicken wings, sosa, sausage, bologna, salami, chitterlings, fatback, hot dogs, bratwurst, and packaged  lunch meats. Liver and organ meats. Whole eggs and egg yolks. Chicken and turkey with skin. Fried meat.  Dairy  · Whole or 2% milk, cream, half-and-half, and cream cheese. Whole milk cheeses. Whole-fat or sweetened yogurt. Full-fat cheeses. Nondairy creamers and whipped toppings. Processed cheese, cheese spreads, and cheese curds.  Beverages  · Alcohol. Sugar-sweetened drinks such as sodas, lemonade, and fruit drinks.  Fats and oils  · Butter, stick margarine, lard, shortening, ghee, or sosa fat. Coconut, palm kernel, and palm oils.  Sweets and desserts  · Corn syrup, sugars, honey, and molasses. Candy. Jam and jelly. Syrup. Sweetened cereals. Cookies, pies, cakes, donuts, muffins, and ice cream.  The items listed above may not be a complete list of foods and beverages to avoid. Contact your dietitian for more information.  The items listed above may not be a complete list of foods and beverages [you/your child] should avoid. Contact a dietitian for more information.  Summary  · Choosing the right foods helps keep your fat and cholesterol at normal levels. This can keep you from getting certain diseases.  · At meals, fill one-half of your plate with vegetables and green salads.  · Eat high-fiber foods, like whole grains, beans, apples, carrots, peas, and barley.  · Limit added sugar, saturated fats, alcohol, and fried foods.  This information is not intended to replace advice given to you by your health care provider. Make sure you discuss any questions you have with your health care provider.  Document Released: 06/18/2013 Document Revised: 09/04/2018 Document Reviewed: 09/04/2018  [employer] Interactive Patient Education © 2019 [employer]  BMI for Adults    Body mass index (BMI) is a number that is calculated from a person's weight and height. BMI may help to estimate how much of a person's weight is composed of fat. BMI can help identify those who may be at higher risk for certain medical problems.  How is BMI  "used with adults?  BMI is used as a screening tool to identify possible weight problems. It is used to check whether a person is obese, overweight, healthy weight, or underweight.  How is BMI calculated?  BMI measures your weight and compares it to your height. This can be done either in English ([place] or metric measurements. Note that charts are available to help you find your BMI quickly and easily without having to do these calculations yourself.  To calculate your BMI in English ([place] measurements, your health care provider will:  1. Measure your weight in pounds (lb).  2. Multiply the number of pounds by 703.  ? For example, for a person who weighs 180 lb, multiply that number by 703, which equals 126,540.  3. Measure your height in inches (in). Then multiply that number by itself to get a measurement called \"inches squared.\"  ? For example, for a person who is 70 in tall, the \"inches squared\" measurement is 70 in x 70 in, which equals 4900 inches squared.  4. Divide the total from Step 2 (number of lb x 703) by the total from Step 3 (inches squared): 126,540 ÷ 4900 = 25.8. This is your BMI.  To calculate your BMI in metric measurements, your health care provider will:  1. Measure your weight in kilograms (kg).  2. Measure your height in meters (m). Then multiply that number by itself to get a measurement called \"meters squared.\"  ? For example, for a person who is 1.75 m tall, the \"meters squared\" measurement is 1.75 m x 1.75 m, which is equal to 3.1 meters squared.  3. Divide the number of kilograms (your weight) by the meters squared number. In this example: 70 ÷ 3.1 = 22.6. This is your BMI.  How is BMI interpreted?  To interpret your results, your health care provider will use BMI charts to identify whether you are underweight, normal weight, overweight, or obese. The following guidelines will be used:  · Underweight: BMI less than 18.5.  · Normal weight: BMI between 18.5 and 24.9.  · Overweight: BMI " between 25 and 29.9.  · Obese: BMI of 30 and above.  Please note:  · Weight includes both fat and muscle, so someone with a muscular build, such as an athlete, may have a BMI that is higher than 24.9. In cases like these, BMI is not an accurate measure of body fat.  · To determine if excess body fat is the cause of a BMI of 25 or higher, further assessments may need to be done by a health care provider.  · BMI is usually interpreted in the same way for men and women.  Why is BMI a useful tool?  BMI is useful in two ways:  · Identifying a weight problem that may be related to a medical condition, or that may increase the risk for medical problems.  · Promoting lifestyle and diet changes in order to reach a healthy weight.  Summary  · Body mass index (BMI) is a number that is calculated from a person's weight and height.  · BMI may help to estimate how much of a person's weight is composed of fat. BMI can help identify those who may be at higher risk for certain medical problems.  · BMI can be measured using English measurements or metric measurements.  · To interpret your results, your health care provider will use BMI charts to identify whether you are underweight, normal weight, overweight, or obese.  This information is not intended to replace advice given to you by your health care provider. Make sure you discuss any questions you have with your health care provider.  Document Released: 08/29/2005 Document Revised: 10/31/2018 Document Reviewed: 10/31/2018  [employer] Interactive Patient Education © 2019 [employer]    Premature Ventricular Contraction    A premature ventricular contraction (PVC) is a common kind of irregular heartbeat (arrhythmia). These contractions are extra heartbeats that start in the ventricles of the heart and occur too early in the normal sequence. During the PVC, the heart's normal electrical pathway is not used, so the beat is shorter and less effective. In most cases, these contractions  come and go and do not require treatment.  What are the causes?  Common causes of the condition include:  · Smoking.  · Drinking alcohol.  · Certain medicines.  · Some illegal drugs.  · Stress.  · Caffeine.  Certain medical conditions can also cause PVCs:  · Heart failure.  · Heart attack, or coronary artery disease.  · Heart valve problems.  · Changes in minerals in the blood (electrolytes).  · Low blood oxygen levels or high carbon dioxide levels.  In many cases, the cause of this condition is not known.  What are the signs or symptoms?  The main symptom of this condition is fast or skipped heartbeats (palpitations). Other symptoms include:  · Chest pain.  · Shortness of breath.  · Feeling tired.  · Dizziness.  · Difficulty exercising.  In some cases, there are no symptoms.  How is this diagnosed?  This condition may be diagnosed based on:  · Your medical history.  · A physical exam. During the exam, the health care provider will check for irregular heartbeats.  · Tests, such as:  ? An ECG (electrocardiogram) to monitor the electrical activity of your heart.  ? Ambulatory cardiac monitor. This device records your heartbeats for 24 hours or more.  ? Stress tests to see how exercise affects your heart rhythm and blood supply.  ? Echocardiogram. This test uses sound waves (ultrasound) to produce an image of your heart.  ? Electrophysiology study (EPS). This test checks for electrical problems in your heart.  How is this treated?  Treatment for this condition depends on any underlying conditions, the type of PVCs that you are having, and how much the symptoms are interfering with your daily life.  Possible treatments include:  · Avoiding things that cause premature contractions (triggers). These include caffeine and alcohol.  · Taking medicines if symptoms are severe or if the extra heartbeats are frequent.  · Getting treatment for underlying conditions that cause PVCs.  · Having an implantable cardioverter  defibrillator (ICD), if you are at risk for a serious arrhythmia. The ICD is a small device that is inserted into your chest to monitor your heartbeat. When it senses an irregular heartbeat, it sends a shock to bring the heartbeat back to normal.  · Having a procedure to destroy the portion of the heart tissue that sends out abnormal signals (catheter ablation).  In some cases, no treatment is required.  Follow these instructions at home:  Alcohol use    · Do not drink alcohol if:  ? Your health care provider tells you not to drink.  ? You are pregnant, may be pregnant, or are planning to become pregnant.  ? Alcohol triggers your episodes.  · If you drink alcohol, limit how much you have. You may drink:  ? 0-1 drink a day for women.  ? 0-2 drinks a day for men.  · Be aware of how much alcohol is in your drink. In the [place] one drink equals one typical bottle of beer (12 oz), one-half glass of wine (5 oz), or one shot of hard liquor (1½ oz).  General instructions  · Do not use any products that contain nicotine or tobacco, such as cigarettes and e-cigarettes. If you need help quitting, ask your health care provider.  · Find healthy ways to manage stress. Avoid stressful situations when possible.  · Exercise regularly. Ask your health care provider what type of exercise is safe for you.  · Try to get at least 7-9 hours of sleep each night, or as much as recommended by your health care provider.  · If caffeine triggers episodes of PVC, do not eat, drink, or use anything with caffeine in it.  · Do not use illegal drugs.  · Take over-the-counter and prescription medicines only as told by your health care provider.  · Keep all follow-up visits as told by your health care provider. This is important.  Contact a health care provider if you:  · Feel palpitations.  Get help right away if you:  · Have chest pain.  · Have shortness of breath.  · Have sweating for no reason.  · Have nausea and vomiting.  · Become light-headed or  you faint.  Summary  · A premature ventricular contraction (PVC) is a common kind of irregular heartbeat (arrhythmia).  · In most cases, these contractions come and go and do not require treatment.  · You may need to wear an ambulatory cardiac monitor. This records your heartbeats for 24 hours or more.  · Treatment depends on any underlying conditions, the type of PVCs that you are having, and how much the symptoms are interfering with your daily life.  This information is not intended to replace advice given to you by your health care provider. Make sure you discuss any questions you have with your health care provider.  Document Released: 08/04/2005 Document Revised: 02/05/2019 Document Reviewed: 02/05/2019  [employer] Interactive Patient Education © 2019 [employer]    Premature Atrial Contraction    A premature atrial contraction (PAC) is a kind of irregular heartbeat (arrhythmia). It happens when the heart beats too early and then pauses before beating again.  The heart has four areas, or chambers. Normally, electrical signals spread across the heart and make all the chambers beat together. During a PAC, the upper chambers of the heart (atria) beat too early, before they have had time to fill with blood. The heartbeat pauses afterward so the heart can fill with blood for the next beat.  Sometimes PAC can be a warning sign of another type of arrhythmia called atrial fibrillation. Atrial fibrillation may allow blood to pool in the atria and form clots. If a clot travels to the brain, it can cause a stroke.  What are the causes?  The cause of this condition is often unknown. Sometimes it is caused by heart disease or injury to the heart.  What increases the risk?  This condition is more likely to develop in children and in adults who are 50 years of age or older. Episodes may be triggered by:  · Caffeine.  · Alcohol.  · Tobacco use.  · Stimulant drugs.  · Some medicines or supplements.  · Stress.  · Heart  "disease.  What are the signs or symptoms?  Symptoms of this condition include:  · A feeling that your heart skipped a beat. The first heartbeat after the \"skipped\" beat may feel more forceful.  · A feeling that your heart is fluttering.  How is this diagnosed?  This condition is diagnosed based on:  · Your symptoms.  · A physical exam. Your health care provider may listen to your heart.  · Electrocardiogram (ECG). This is a test that records the electrical impulses of the heart.  · Ambulatory cardiac monitor. This device records your heartbeats for 24 hours or more.  You may also have:  · An echocardiogram to check for any heart conditions. This is a type of imaging test that uses sound waves (ultrasound) to make images of your heart.  · Blood tests.  How is this treated?  Treatment depends on the frequency of your symptoms and other risk factors. Treatments may include:  · Medicines (beta-blockers).  · Catheter ablation. This is done to destroy the part of the heart tissue that sends abnormal signals.  In some cases, treatment may not be needed for this condition.  Follow these instructions at home:  Alcohol use  · Do not drink alcohol if:  ? Your health care provider tells you not to drink.  ? You are pregnant, may be pregnant, or are planning to become pregnant.  ? Alcohol triggers your episodes.  · If you drink alcohol, limit how much you have. You may drink:  ? 0-1 drink a day for women.  ? 0-2 drinks a day for men.  · Be aware of how much alcohol is in your drink. In the [place] one drink equals one typical bottle of beer (12 oz), one-half glass of wine (5 oz), or one shot of hard liquor (1½ oz).  General instructions  · Do not use any products that contain nicotine or tobacco, such as cigarettes and e-cigarettes. If you need help quitting, ask your health care provider.  · If caffeine triggers episodes, do not eat, drink, or use anything with caffeine in it.  · Exercise regularly. Ask your health care provider " "what type of exercise is safe for you.  · Find healthy ways to manage stress.  · Try to get at least 7-9 hours of sleep each night, or as much as recommended by your health care provider.  · Take over-the-counter and prescription medicines only as told by your health care provider.  · Keep all follow-up visits as told by your health care provider. This is important.  Contact a health care provider if:  · You feel your heart skipping beats.  · Your heart skips beats and you feel dizzy, light-headed, or very tired.  Get help right away if you have:  · Chest pain.  · Trouble breathing.  · Any symptoms of stroke. \"BE FAST\" is an easy way to remember the main warning signs of a stroke.  ? B - Balance. Signs are dizziness, sudden trouble walking, or loss of balance.  ? E - Eyes. Signs are trouble seeing or a sudden change in vision.  ? F - Face. Signs are sudden weakness or numbness of the face, or the face or eyelid drooping on one side.  ? A - Arms. Signs are weakness or numbness in an arm. This happens suddenly and usually on one side of the body.  ? S - Speech. Signs are sudden trouble speaking, slurred speech, or trouble understanding what people say.  ? T - Time. Time to call emergency services. Write down what time symptoms started.  · Other signs of stroke, such as:  ? A sudden, severe headache with no known cause.  ? Nausea or vomiting.  ? Seizure.  These symptoms may represent a serious problem that is an emergency. Do not wait to see if the symptoms will go away. Get medical help right away. Call your local emergency services (911 in the [place] Do not drive yourself to the hospital.  Summary  · A premature atrial contraction (PAC) is a kind of irregular heartbeat (arrhythmia). It happens when the heart beats too early and then pauses before beating again.  · Treatment depends on your symptoms and whether you have other underlying heart conditions.  · Contact a health care provider if your heart skips beats and " "you feel dizzy, light-headed, or very tired.  · In some cases, this condition may lead to a stroke. \"BE FAST\" is an easy way to remember the warning signs of stroke. Get help right away if you have any of the \"BE FAST\" signs.  This information is not intended to replace advice given to you by your health care provider. Make sure you discuss any questions you have with your health care provider.  Document Released: 08/21/2015 Document Revised: 02/05/2019 Document Reviewed: 02/05/2019  [employer] Interactive Patient Education © 2019 [employer]    "

## 2019-10-03 NOTE — PROGRESS NOTES
Akash Ch is a 64 y.o. female     Chief Complaint   Patient presents with   • Follow-up   • Hypertension   • Palpitations       HPI    Problem list:    1. SVT - flecainide therapy  1.1 Event Monitor 5/7-5/20-16 - NSR with PACs and PVCs  1.2  event monitor 11/19-12/2/18-PACs and PVCs  2. Bradycardia  3. Left atrial tachycardia and right atrial isthmus-dependent flutter   3.1 RFA 10/14/2014  4. Grave’s disease, status post ablation with hypothyroidism.   5. Dyslipidemia.   6. Hypertension  6.1 Stress test 7/23/14 - low risk, no ischemia   6.2 Stress Test 7/12/17-no ischemia, low risk  6.3 Echo 7/21/14 - EF 55-60%; DD II; trace MR, TR  6.4 Echo 7/12/17-EF greater than 65%, diastolic dysfunction 1, trace TR, trace NE, PA 20-25  6.5 Echo 10/2/18 - EF 64%; borderline left atrial enlargement   6.6 stress test 8/5/19-no evidence of ischemia, post-rest EF 64%, occasional PAC  7. Cardiomegaly  7.1 CT Chest 8/5/15 - Cardiomegaly; suspected hepatosplenomegaly with fatty liver  8. Carotid Artery Disease   8.1 Carotid Artery US 10/2/18 - mild atherosclerotic was noted involving the carotid systems; antegrade flow both vertebral arteries.    Patient is a 64-year-old female who presents today for follow-up on testing.  She denies any chest pain, pressure, dizziness, presyncope, syncope, orthopnea, PND or edema.  She says she has palpitations there are lots on some days and then some days she will have them at all.  The last time we checked her thyroid it was overactive.  She says she does not know but thought she had a check with her PCP.  We will go ahead and check it again today because this could be the cause of her palpitations.  Once we have those results she will determine if she wants to go up on her flecainide or if she just wants to wait until she sees EP in February.  Her daughter is currently pregnant.    We went over stress test.    Current Outpatient Medications   Medication Sig Dispense Refill   •  aspirin 81 MG tablet Take 1 tablet by mouth daily.     • atenolol (TENORMIN) 25 MG tablet Take 1/2 tab once daily 15 tablet 11   • cetirizine (zyrTEC) 10 MG tablet TAKE 1 TABLET BY MOUTH DAILY 30 tablet 2   • flecainide (TAMBOCOR) 150 MG tablet TAKE 1/2 TABLET BY MOUTH TWICE DAILY 90 tablet 1   • levothyroxine (SYNTHROID, LEVOTHROID) 137 MCG tablet Take 1 tablet by mouth Daily. 90 tablet 1   • lisinopril (PRINIVIL,ZESTRIL) 2.5 MG tablet TAKE 1 TABLET BY MOUTH EVERY DAY 30 tablet 6   • mupirocin (BACTROBAN) 2 % ointment Apply  topically to the appropriate area as directed 3 (Three) Times a Day. 15 g 0   • nitroglycerin (NITROSTAT) 0.4 MG SL tablet 1 under the tongue as needed for angina, may repeat q5mins for up three doses 30 tablet 3   • pravastatin (PRAVACHOL) 40 MG tablet Take 1 tablet by mouth Every Night. 90 tablet 1     No current facility-administered medications for this visit.        ALLERGIES    Patient has no known allergies.    Past Medical History:   Diagnosis Date   • Allergic    • Arrhythmia    • Enlarged heart    • Graves' disease    • Hx of mammogram 2016   • Hx of staphylococcal infection    • Hyperlipidemia    • Hypertension 2016       Social History     Socioeconomic History   • Marital status:      Spouse name: Not on file   • Number of children: Not on file   • Years of education: Not on file   • Highest education level: Not on file   Tobacco Use   • Smoking status: Former Smoker     Packs/day: 0.50     Years: 25.00     Pack years: 12.50     Types: Cigarettes     Last attempt to quit: 2001     Years since quittin.4   • Smokeless tobacco: Never Used   Substance and Sexual Activity   • Alcohol use: No   • Drug use: No   • Sexual activity: Defer       Family History   Problem Relation Age of Onset   • Hypertension Mother    • Breast cancer Mother    • Bone cancer Mother    • Cancer Mother    • Heart attack Father    • Heart disease Father         pacemaker   •  "Hyperlipidemia Father    • Hypertension Father    • Diabetes Father         Passed away   • Breast cancer Sister    • Ovarian cancer Sister    • Cancer Sister         sister passed away   • Diabetes Paternal Grandmother        Review of Systems   Constitutional: Positive for fatigue. Negative for diaphoresis.   HENT: Negative for congestion, rhinorrhea and sore throat.    Eyes: Positive for visual disturbance (glasses daily ).   Respiratory: Negative for chest tightness and shortness of breath.    Cardiovascular: Positive for palpitations (\"Lots and lots\"' some days really bad and some times none at all ). Negative for chest pain (Denies CP ) and leg swelling.   Gastrointestinal: Negative for abdominal pain, blood in stool, constipation, diarrhea, nausea and vomiting.   Endocrine: Negative for cold intolerance and heat intolerance.        Pt c/o hot flashes    Genitourinary: Negative for difficulty urinating, dysuria, frequency, hematuria and urgency.   Musculoskeletal: Negative for arthralgias, back pain and neck pain.   Skin: Negative for rash.   Allergic/Immunologic: Negative for environmental allergies and food allergies.   Neurological: Negative for dizziness, syncope, weakness, light-headedness, numbness and headaches.   Hematological: Bruises/bleeds easily (bruises).   Psychiatric/Behavioral: Positive for sleep disturbance (Issues staying asleep ).       Objective   /75   Pulse 62   Ht 167.6 cm (66\")   Wt 81.6 kg (179 lb 12.8 oz)   SpO2 94%   BMI 29.02 kg/m²   Vitals:    10/03/19 0918   BP: 116/75   Pulse: 62   SpO2: 94%   Weight: 81.6 kg (179 lb 12.8 oz)   Height: 167.6 cm (66\")      Lab Results (most recent)     None        Physical Exam   Constitutional: She is oriented to person, place, and time. Vital signs are normal. She appears well-developed and well-nourished. She is active and cooperative.   HENT:   Head: Normocephalic.   Eyes: Lids are normal.   Wears glasses    Neck: Normal carotid " pulses, no hepatojugular reflux and no JVD present. Carotid bruit is not present.   Cardiovascular: Normal rate, regular rhythm and normal heart sounds.   Pulses:       Radial pulses are 2+ on the right side, and 2+ on the left side.        Dorsalis pedis pulses are 2+ on the right side, and 2+ on the left side.        Posterior tibial pulses are 2+ on the right side, and 2+ on the left side.   No edema BLE.    Pulmonary/Chest: Effort normal and breath sounds normal.   Abdominal: Normal appearance and bowel sounds are normal.   Neurological: She is alert and oriented to person, place, and time.   Skin: Skin is warm, dry and intact.   Tattoos    Psychiatric: She has a normal mood and affect. Her speech is normal and behavior is normal. Judgment and thought content normal. Cognition and memory are normal.       Procedure     ECG 12 Lead  Date/Time: 10/3/2019 9:47 AM  Performed by: Mary Guthrie APRN  Authorized by: Mary Guthrie APRN   Comparison: compared with previous ECG from 1/11/2019  Similar to previous ECG  Rhythm: sinus rhythm  Rate: normal  BPM: 60  QRS axis: normal    Clinical impression: normal ECG  Comments: QT/QTc 426/427                 Assessment/Plan      Diagnosis Plan   1. Essential hypertension     2. PAC (premature atrial contraction)  TSH    T3, Free    T4, Free   3. Palpitations  TSH    T3, Free    T4, Free   4. Supraventricular tachycardia (CMS/HCC)  TSH    T3, Free    T4, Free   5. Dyslipidemia     6. Shortness of breath     7. Healthcare maintenance  TSH    T3, Free    T4, Free       Return in about 5 months (around 3/3/2020).    Hypertension-patient's doing well on atenolol and lisinopril.  PACs/palpitations/SVT-patient is on atenolol and flecainide.  Dyslipidemia-patient is on pravastatin monitor by PCP.  Shortness of breath-resolved.  She will get TSH, free T3 and free T4 today.  She will continue her medication regimen.  She will follow-up in 5 months or sooner if any changes.            Patient's Body mass index is 29.02 kg/m². BMI is above normal parameters. Recommendations include: educational material.      Electronically signed by:

## 2019-10-04 LAB — T3FREE SERPL-MCNC: 2.95 PG/ML (ref 2–4.4)

## 2019-10-07 ENCOUNTER — TELEPHONE (OUTPATIENT)
Dept: CARDIOLOGY | Facility: CLINIC | Age: 64
End: 2019-10-07

## 2019-10-07 NOTE — TELEPHONE ENCOUNTER
----- Message from JOSE Michael sent at 10/3/2019  7:07 PM EDT -----  Regarding: FW:  Please advise patient.  Thyroid back to normal range  ----- Message -----  From: Lab, Background User  Sent: 10/3/2019   6:58 PM  To: JOSE Michael

## 2019-10-07 NOTE — TELEPHONE ENCOUNTER
TSH  0.270 - 4.200 uIU/mL 1.140  0.276 Abnormally low  R 2.910 R     Informed pt of her results, she verbalized understanding.

## 2019-10-11 ENCOUNTER — TELEPHONE (OUTPATIENT)
Dept: FAMILY MEDICINE CLINIC | Facility: CLINIC | Age: 64
End: 2019-10-11

## 2019-10-11 NOTE — TELEPHONE ENCOUNTER
PATIENT CALLED AND STATES THAT SHE NEEDS A CALL BACK IN REGARD TO RECENT MAMMO THAT SHE HAD DONE.    PLEASE ADVISE.

## 2019-10-11 NOTE — TELEPHONE ENCOUNTER
I called and spoke with the patient regarding her mammogram. She states that she was advised since she has a strong family history of breast cancer to possible have either blood work done to test for the gene or to have a diagnostic mammogram.    I advised the patient that I would have to speak with sid regarding this matter and that we would need to see the results of her mammogram that she had completed yesterday.    I also advised the patient that I am unsure of how expensive the genetic testing for breast cancer is.

## 2019-10-12 NOTE — TELEPHONE ENCOUNTER
Once I get the regular Mammogram back, I will be able to tell her more. If regular Mammogram is normal, she can not have a Diagnostic Mammogram. I can put in orders for the breast cancer marker and let Pati run it through to see if its covered or she can call her insurance and ask before she comes in here, in case they are not covered.

## 2019-10-14 ENCOUNTER — RESULTS ENCOUNTER (OUTPATIENT)
Dept: FAMILY MEDICINE CLINIC | Facility: CLINIC | Age: 64
End: 2019-10-14

## 2019-10-14 DIAGNOSIS — N63.20 BREAST MASS, LEFT: ICD-10-CM

## 2019-10-14 DIAGNOSIS — R92.8 ABNORMALITY OF BOTH BREASTS ON SCREENING MAMMOGRAM: ICD-10-CM

## 2019-10-14 DIAGNOSIS — Z80.3 FAMILY HISTORY OF BREAST CANCER IN MOTHER: ICD-10-CM

## 2019-10-14 DIAGNOSIS — Z80.3 FAMILY HISTORY OF BREAST CANCER IN MOTHER: Primary | ICD-10-CM

## 2019-10-14 DIAGNOSIS — Z80.3 FAMILY HISTORY OF BREAST CANCER: Primary | ICD-10-CM

## 2019-10-17 ENCOUNTER — TELEPHONE (OUTPATIENT)
Dept: FAMILY MEDICINE CLINIC | Facility: CLINIC | Age: 64
End: 2019-10-17

## 2019-10-28 ENCOUNTER — TELEPHONE (OUTPATIENT)
Dept: FAMILY MEDICINE CLINIC | Facility: CLINIC | Age: 64
End: 2019-10-28

## 2019-10-30 DIAGNOSIS — Z80.3 FAMILY HISTORY OF BREAST CANCER IN MOTHER: Primary | ICD-10-CM

## 2019-10-30 DIAGNOSIS — N63.20 BREAST MASS, LEFT: ICD-10-CM

## 2019-10-30 DIAGNOSIS — Z91.89 AT HIGH RISK FOR BREAST CANCER: ICD-10-CM

## 2019-10-30 DIAGNOSIS — R92.8 ABNORMALITY OF BOTH BREASTS ON SCREENING MAMMOGRAM: ICD-10-CM

## 2019-12-10 DIAGNOSIS — R09.82 PND (POST-NASAL DRIP): ICD-10-CM

## 2019-12-10 DIAGNOSIS — J30.2 SEASONAL ALLERGIC RHINITIS: ICD-10-CM

## 2019-12-10 RX ORDER — CETIRIZINE HYDROCHLORIDE 10 MG/1
10 TABLET ORAL DAILY
Qty: 30 TABLET | Refills: 2 | Status: SHIPPED | OUTPATIENT
Start: 2019-12-10 | End: 2020-03-09

## 2019-12-26 ENCOUNTER — OFFICE VISIT (OUTPATIENT)
Dept: FAMILY MEDICINE CLINIC | Facility: CLINIC | Age: 64
End: 2019-12-26

## 2019-12-26 VITALS
HEIGHT: 66 IN | BODY MASS INDEX: 28.37 KG/M2 | WEIGHT: 176.5 LBS | TEMPERATURE: 97.9 F | HEART RATE: 78 BPM | DIASTOLIC BLOOD PRESSURE: 60 MMHG | OXYGEN SATURATION: 96 % | SYSTOLIC BLOOD PRESSURE: 110 MMHG

## 2019-12-26 DIAGNOSIS — Z00.00 ANNUAL PHYSICAL EXAM: Primary | ICD-10-CM

## 2019-12-26 DIAGNOSIS — I49.1 PAC (PREMATURE ATRIAL CONTRACTION): ICD-10-CM

## 2019-12-26 DIAGNOSIS — R00.2 PALPITATIONS: ICD-10-CM

## 2019-12-26 DIAGNOSIS — E78.5 DYSLIPIDEMIA: ICD-10-CM

## 2019-12-26 DIAGNOSIS — I10 ESSENTIAL HYPERTENSION: ICD-10-CM

## 2019-12-26 DIAGNOSIS — I47.1 SUPRAVENTRICULAR TACHYCARDIA (HCC): ICD-10-CM

## 2019-12-26 DIAGNOSIS — E55.9 VITAMIN D DEFICIENCY: ICD-10-CM

## 2019-12-26 DIAGNOSIS — E03.9 ACQUIRED HYPOTHYROIDISM: ICD-10-CM

## 2019-12-26 PROBLEM — R09.82 PND (POST-NASAL DRIP): Status: RESOLVED | Noted: 2018-03-19 | Resolved: 2019-12-26

## 2019-12-26 PROCEDURE — 99396 PREV VISIT EST AGE 40-64: CPT | Performed by: NURSE PRACTITIONER

## 2019-12-26 NOTE — PROGRESS NOTES
"Akash Ch is a 64 y.o. female.     Patient is here today for her annual physical and follow up chronic conditions.    She is continuing to follow a healthy diet and walking on her treadmill. She has lost about 22 pounds in the past 6 months. She is hoping this has helped her cholesterol to improve as well. She is taking her Pravachol as directed. She will RTC fasting to have it checked. Her vaccines and screenings are up to date. She does not use tobacco, alcohol or illicit drugs.     Essential hypertension/palpitations/SVT/PAC  -All conditions have been controlled with Atenolol, Lisinopril and Tambocor. She just saw Cardiology in August and everything was ok. She follows them every 6 months.     Acquired hypothyroidism  -She is taking her Levothyroxine as directed, feels her condition is controlled.    Vitamin D deficency  -She is taking OTC Vitamin D as directed every day. Level was normal last year.                  The following portions of the patient's history were reviewed and updated as appropriate: allergies, current medications, past family history, past medical history, past social history, past surgical history and problem list.    Review of Systems   Constitutional: Negative.    HENT: Negative.    Eyes: Negative.    Respiratory: Negative.    Cardiovascular: Negative.    Gastrointestinal: Negative.    Genitourinary: Negative.    Musculoskeletal: Negative.    Skin: Negative.    Allergic/Immunologic: Negative.    Neurological: Negative for dizziness, syncope, weakness and numbness.   Hematological: Negative for adenopathy.   Psychiatric/Behavioral: Negative for confusion, dysphoric mood and suicidal ideas. The patient is not nervous/anxious.      Vitals:    12/26/19 1454   BP: 110/60   Pulse: 78   Temp: 97.9 °F (36.6 °C)   TempSrc: Temporal   SpO2: 96%   Weight: 80.1 kg (176 lb 8 oz)   Height: 167.6 cm (66\")     Objective   Physical Exam   Constitutional: She is oriented to person, place, " and time. She appears well-developed and well-nourished. No distress.   HENT:   Head: Normocephalic.   Right Ear: External ear normal.   Left Ear: External ear normal.   Nose: Nose normal.   Eyes: Conjunctivae are normal.   Neck: Normal range of motion. Neck supple. No tracheal deviation present. No thyromegaly present.   Cardiovascular: Normal rate, regular rhythm, normal heart sounds and intact distal pulses.   No murmur heard.  Pulmonary/Chest: Effort normal and breath sounds normal. No respiratory distress. She has no wheezes. She has no rales. She exhibits no tenderness.   Abdominal: Soft. Bowel sounds are normal. She exhibits no distension and no mass. There is no hepatosplenomegaly or splenomegaly. There is no tenderness. There is no rebound and no guarding. No hernia.   Musculoskeletal: Normal range of motion. She exhibits no edema or tenderness.   Lymphadenopathy:     She has no cervical adenopathy.        Right cervical: No superficial cervical, no deep cervical and no posterior cervical adenopathy present.       Left cervical: No superficial cervical, no deep cervical and no posterior cervical adenopathy present.   Neurological: She is alert and oriented to person, place, and time. Coordination and gait normal.   Skin: Skin is warm and dry. No rash noted.   Psychiatric: She has a normal mood and affect. Her behavior is normal. Judgment and thought content normal.   Nursing note and vitals reviewed.      Assessment/Plan   Justina was seen today for hypertension, hypothyroidism and foot pain.    Diagnoses and all orders for this visit:    Annual physical exam    Dyslipidemia  -     CBC (No Diff); Future  -     Comprehensive Metabolic Panel; Future  -     Lipid Panel; Future    Essential hypertension  -     CBC (No Diff); Future  -     Comprehensive Metabolic Panel; Future    Palpitations    Supraventricular tachycardia (CMS/HCC)  -     CBC (No Diff); Future  -     Comprehensive Metabolic Panel; Future    PAC  (premature atrial contraction)  -     CBC (No Diff); Future  -     Comprehensive Metabolic Panel; Future    Acquired hypothyroidism  -     TSH; Future  -     T4, Free; Future    Vitamin D deficiency      Annual physical completed today, with findings noted above. He screening and vaccinations are up to date. She was congratulated on her lifestyle changes and weight loss and encouraged to continue. Nutrition and activity goals reviewed including: mainly water to drink, limit white flour/processed sugar, higher lean protein, high fiber carbs, regular meals, working toward 150 mins cardio per week, resistance training 2x/week. She will RTC for FLP. Continue Pravachol as directed, for hyperlipidemia.     HTN/palpitations/SVT/PAC  -Conditions controlled with Atenolol, Lisinopril and Tambocor. Continue to follow with Cardiology q 6 months. CBC and CMP ordered today.    Hypothyroidism  -Continue Levothyroxine as directed. Thyroid hormones ordered and patient will RTC to have drawn.     Vitamin D   -Continue OTC Vitamin D as directed.     Patient was encouraged to keep me informed of any acute changes, lack of improvement, or any new concerning symptoms. Patient voiced understanding of all instructions and denied further questions.    Patient to RTC in 6 months for follow up and prn.

## 2019-12-29 DIAGNOSIS — E03.9 ACQUIRED HYPOTHYROIDISM: ICD-10-CM

## 2019-12-30 RX ORDER — LEVOTHYROXINE SODIUM 137 UG/1
137 TABLET ORAL DAILY
Qty: 90 TABLET | Refills: 1 | Status: SHIPPED | OUTPATIENT
Start: 2019-12-30 | End: 2020-04-08

## 2019-12-31 ENCOUNTER — RESULTS ENCOUNTER (OUTPATIENT)
Dept: FAMILY MEDICINE CLINIC | Facility: CLINIC | Age: 64
End: 2019-12-31

## 2019-12-31 DIAGNOSIS — I49.1 PAC (PREMATURE ATRIAL CONTRACTION): ICD-10-CM

## 2019-12-31 DIAGNOSIS — E78.5 DYSLIPIDEMIA: ICD-10-CM

## 2019-12-31 DIAGNOSIS — E03.9 ACQUIRED HYPOTHYROIDISM: ICD-10-CM

## 2019-12-31 DIAGNOSIS — I10 ESSENTIAL HYPERTENSION: ICD-10-CM

## 2019-12-31 DIAGNOSIS — I47.1 SUPRAVENTRICULAR TACHYCARDIA (HCC): ICD-10-CM

## 2020-01-13 DIAGNOSIS — E78.5 DYSLIPIDEMIA: ICD-10-CM

## 2020-01-13 DIAGNOSIS — I10 ESSENTIAL HYPERTENSION: ICD-10-CM

## 2020-01-13 RX ORDER — PRAVASTATIN SODIUM 40 MG
40 TABLET ORAL NIGHTLY
Qty: 30 TABLET | Refills: 5 | Status: SHIPPED | OUTPATIENT
Start: 2020-01-13 | End: 2020-07-07

## 2020-01-13 RX ORDER — LISINOPRIL 2.5 MG/1
TABLET ORAL
Qty: 30 TABLET | Refills: 6 | Status: SHIPPED | OUTPATIENT
Start: 2020-01-13 | End: 2020-07-07

## 2020-02-11 LAB
ALBUMIN SERPL-MCNC: 4.1 G/DL (ref 3.5–5.2)
ALBUMIN/GLOB SERPL: 1.9 G/DL
ALP SERPL-CCNC: 168 U/L (ref 39–117)
ALT SERPL-CCNC: 31 U/L (ref 1–33)
AST SERPL-CCNC: 35 U/L (ref 1–32)
BILIRUB SERPL-MCNC: 0.4 MG/DL (ref 0.2–1.2)
BUN SERPL-MCNC: 15 MG/DL (ref 8–23)
BUN/CREAT SERPL: 20.8 (ref 7–25)
CALCIUM SERPL-MCNC: 8.6 MG/DL (ref 8.6–10.5)
CHLORIDE SERPL-SCNC: 104 MMOL/L (ref 98–107)
CHOLEST SERPL-MCNC: 129 MG/DL (ref 0–200)
CO2 SERPL-SCNC: 25.1 MMOL/L (ref 22–29)
CREAT SERPL-MCNC: 0.72 MG/DL (ref 0.57–1)
ERYTHROCYTE [DISTWIDTH] IN BLOOD BY AUTOMATED COUNT: 12.9 % (ref 12.3–15.4)
GLOBULIN SER CALC-MCNC: 2.2 GM/DL
GLUCOSE SERPL-MCNC: 99 MG/DL (ref 65–99)
HCT VFR BLD AUTO: 43.2 % (ref 34–46.6)
HDLC SERPL-MCNC: 40 MG/DL (ref 40–60)
HGB BLD-MCNC: 14.5 G/DL (ref 12–15.9)
LDLC SERPL CALC-MCNC: 61 MG/DL (ref 0–100)
MCH RBC QN AUTO: 32.2 PG (ref 26.6–33)
MCHC RBC AUTO-ENTMCNC: 33.6 G/DL (ref 31.5–35.7)
MCV RBC AUTO: 96 FL (ref 79–97)
PLATELET # BLD AUTO: 220 10*3/MM3 (ref 140–450)
POTASSIUM SERPL-SCNC: 4 MMOL/L (ref 3.5–5.2)
PROT SERPL-MCNC: 6.3 G/DL (ref 6–8.5)
RBC # BLD AUTO: 4.5 10*6/MM3 (ref 3.77–5.28)
SODIUM SERPL-SCNC: 142 MMOL/L (ref 136–145)
T4 FREE SERPL-MCNC: 1.62 NG/DL (ref 0.93–1.7)
TRIGL SERPL-MCNC: 142 MG/DL (ref 0–150)
TSH SERPL DL<=0.005 MIU/L-ACNC: 0.84 UIU/ML (ref 0.27–4.2)
VLDLC SERPL CALC-MCNC: 28.4 MG/DL
WBC # BLD AUTO: 7.93 10*3/MM3 (ref 3.4–10.8)

## 2020-02-19 ENCOUNTER — TELEPHONE (OUTPATIENT)
Dept: FAMILY MEDICINE CLINIC | Facility: CLINIC | Age: 65
End: 2020-02-19

## 2020-02-19 NOTE — TELEPHONE ENCOUNTER
Patient requesting a call back to discuss the results her recent labs. Patient does not use [employer] / unable to log in.   Patient’s call back number is: [phone]

## 2020-02-19 NOTE — TELEPHONE ENCOUNTER
Patient has been notified regarding her lab results. Patient states that she does not eat fatty greasy foods and does not take tylenol or use alcohol.

## 2020-02-27 ENCOUNTER — LAB (OUTPATIENT)
Dept: CARDIOLOGY | Facility: CLINIC | Age: 65
End: 2020-02-27

## 2020-02-27 ENCOUNTER — OFFICE VISIT (OUTPATIENT)
Dept: CARDIOLOGY | Facility: CLINIC | Age: 65
End: 2020-02-27

## 2020-02-27 VITALS
BODY MASS INDEX: 28.93 KG/M2 | HEART RATE: 64 BPM | WEIGHT: 180 LBS | OXYGEN SATURATION: 96 % | DIASTOLIC BLOOD PRESSURE: 75 MMHG | HEIGHT: 66 IN | SYSTOLIC BLOOD PRESSURE: 119 MMHG

## 2020-02-27 DIAGNOSIS — I47.1 SVT (SUPRAVENTRICULAR TACHYCARDIA) (HCC): ICD-10-CM

## 2020-02-27 DIAGNOSIS — E78.5 DYSLIPIDEMIA: ICD-10-CM

## 2020-02-27 DIAGNOSIS — Z00.00 HEALTHCARE MAINTENANCE: ICD-10-CM

## 2020-02-27 DIAGNOSIS — R00.2 PALPITATIONS: ICD-10-CM

## 2020-02-27 DIAGNOSIS — I49.1 PAC (PREMATURE ATRIAL CONTRACTION): ICD-10-CM

## 2020-02-27 DIAGNOSIS — I47.1 SUPRAVENTRICULAR TACHYCARDIA (HCC): ICD-10-CM

## 2020-02-27 DIAGNOSIS — I10 ESSENTIAL HYPERTENSION: Primary | ICD-10-CM

## 2020-02-27 DIAGNOSIS — I10 ESSENTIAL HYPERTENSION: ICD-10-CM

## 2020-02-27 LAB — MAGNESIUM SERPL-MCNC: 2.1 MG/DL (ref 1.6–2.4)

## 2020-02-27 PROCEDURE — 93000 ELECTROCARDIOGRAM COMPLETE: CPT | Performed by: NURSE PRACTITIONER

## 2020-02-27 PROCEDURE — 99214 OFFICE O/P EST MOD 30 MIN: CPT | Performed by: NURSE PRACTITIONER

## 2020-02-27 PROCEDURE — 36415 COLL VENOUS BLD VENIPUNCTURE: CPT

## 2020-02-27 PROCEDURE — 83735 ASSAY OF MAGNESIUM: CPT | Performed by: NURSE PRACTITIONER

## 2020-02-27 NOTE — PROGRESS NOTES
Akash Ch is a 64 y.o. female     Chief Complaint   Patient presents with   • Follow-up     Here for a 5 month follow up       HPI    Problem list:    1. SVT - flecainide therapy  1.1 Event Monitor 5/7-5/20-16 - NSR with PACs and PVCs  1.2  event monitor 11/19-12/2/18-PACs and PVCs  2. Bradycardia  3. Left atrial tachycardia and right atrial isthmus-dependent flutter   3.1 RFA 10/14/2014  4. Grave’s disease, status post ablation with hypothyroidism.   5. Dyslipidemia.   6. Hypertension  6.1 Stress test 7/23/14 - low risk, no ischemia   6.2 Stress Test 7/12/17-no ischemia, low risk  6.3 Echo 7/21/14 - EF 55-60%; DD II; trace MR, TR  6.4 Echo 7/12/17-EF greater than 65%, diastolic dysfunction 1, trace TR, trace DE, PA 20-25  6.5 Echo 10/2/18 - EF 64%; borderline left atrial enlargement   6.6 stress test 8/5/19-no evidence of ischemia, post-rest EF 64%, occasional PAC  7. Cardiomegaly  7.1 CT Chest 8/5/15 - Cardiomegaly; suspected hepatosplenomegaly with fatty liver  8. Carotid Artery Disease   8.1 Carotid Artery US 10/2/18 - mild atherosclerotic was noted involving the carotid systems; antegrade flow both vertebral arteries.    Patient is a 64-year-old female who presents today for a follow-up.  She denies any chest pain or pressure.  She says she still has days where her heart will just run rapid.  We discussed increasing her flecainide however her QT/QTC had gone up since her previous visit.  She does see EP tomorrow so she will discuss it with them.  She denies any dizziness, presyncope, syncope, orthopnea, PND or edema.  She says she did have an episode a couple of weeks ago where she was seen and she got really lightheaded.  She says she sat down and after a few minutes it went away.  She denies any shortness of breath with activity.  She does stay fatigued.  I reviewed her labs that PCP dary and they were very good.  She is going to get a magnesium level today however.  Patient goes in April to  see her daughter and grandson.  Her daughter had a miscarriage back in October.    Current Outpatient Medications on File Prior to Visit   Medication Sig Dispense Refill   • aspirin 81 MG tablet Take 1 tablet by mouth daily.     • atenolol (TENORMIN) 25 MG tablet Take 1/2 tab once daily 15 tablet 11   • cetirizine (zyrTEC) 10 MG tablet TAKE 1 TABLET BY MOUTH DAILY 30 tablet 2   • flecainide (TAMBOCOR) 150 MG tablet TAKE 1/2 TABLET BY MOUTH TWICE DAILY 90 tablet 1   • levothyroxine (SYNTHROID, LEVOTHROID) 137 MCG tablet TAKE 1 TABLET BY MOUTH DAILY 90 tablet 1   • lisinopril (PRINIVIL,ZESTRIL) 2.5 MG tablet TAKE 1 TABLET BY MOUTH EVERY DAY 30 tablet 6   • nitroglycerin (NITROSTAT) 0.4 MG SL tablet 1 under the tongue as needed for angina, may repeat q5mins for up three doses 30 tablet 3   • pravastatin (PRAVACHOL) 40 MG tablet TAKE 1 TABLET BY MOUTH EVERY NIGHT 30 tablet 5   • [DISCONTINUED] mupirocin (BACTROBAN) 2 % ointment Apply  topically to the appropriate area as directed 3 (Three) Times a Day. 15 g 0     No current facility-administered medications on file prior to visit.        ALLERGIES    Patient has no known allergies.    Past Medical History:   Diagnosis Date   • Allergic    • Arrhythmia    • Enlarged heart    • Graves' disease    • Hx of mammogram 2016   • Hx of staphylococcal infection    • Hyperlipidemia    • Hypertension 2016       Social History     Socioeconomic History   • Marital status:      Spouse name: Not on file   • Number of children: Not on file   • Years of education: Not on file   • Highest education level: Not on file   Tobacco Use   • Smoking status: Former Smoker     Packs/day: 0.50     Years: 25.00     Pack years: 12.50     Types: Cigarettes     Last attempt to quit: 2001     Years since quittin.8   • Smokeless tobacco: Never Used   Substance and Sexual Activity   • Alcohol use: No   • Drug use: No   • Sexual activity: Defer       Family History    "  Problem Relation Age of Onset   • Hypertension Mother    • Breast cancer Mother    • Bone cancer Mother    • Cancer Mother    • Heart attack Father    • Heart disease Father         pacemaker   • Hyperlipidemia Father    • Hypertension Father    • Diabetes Father         Passed away   • Breast cancer Sister    • Ovarian cancer Sister    • Cancer Sister         sister passed away   • Diabetes Paternal Grandmother        Review of Systems   Constitutional: Positive for fatigue (stays tired ). Negative for chills and fever.   HENT: Negative for congestion, rhinorrhea and sore throat.    Eyes: Positive for visual disturbance (glasses daily ).   Respiratory: Negative for chest tightness and shortness of breath.    Cardiovascular: Positive for palpitations (flutters; days where they run rapid ). Negative for chest pain and leg swelling.   Gastrointestinal: Negative for abdominal pain, blood in stool, nausea and vomiting.   Endocrine: Negative for cold intolerance and heat intolerance.   Genitourinary: Negative for dysuria, frequency, hematuria and urgency.   Musculoskeletal: Positive for arthralgias (cramping in toes ). Negative for back pain and neck pain.   Skin: Negative for rash and wound.   Allergic/Immunologic: Negative for environmental allergies and food allergies.   Neurological: Positive for light-headedness (one episode a couple of weeks ago where she almost passed out. States she was standing up and felt funny so she sat down and after a few minutes was okay ). Negative for dizziness and weakness.   Hematological: Bruises/bleeds easily.   Psychiatric/Behavioral: Negative for sleep disturbance (denies waking with smothering ).       Objective   /75 (BP Location: Left arm, Patient Position: Sitting)   Pulse 64   Ht 167.6 cm (66\")   Wt 81.6 kg (180 lb)   SpO2 96%   BMI 29.05 kg/m²   Vitals:    02/27/20 0836   BP: 119/75   BP Location: Left arm   Patient Position: Sitting   Pulse: 64   SpO2: 96% " "  Weight: 81.6 kg (180 lb)   Height: 167.6 cm (66\")      Lab Results (most recent)     None        Physical Exam   Constitutional: She is oriented to person, place, and time. Vital signs are normal. She appears well-developed and well-nourished. She is active and cooperative.   HENT:   Head: Normocephalic.   Eyes: Lids are normal.   Wears glasses    Neck: Normal carotid pulses, no hepatojugular reflux and no JVD present. Carotid bruit is not present.   Cardiovascular: Normal rate, regular rhythm and normal heart sounds.   Pulses:       Radial pulses are 2+ on the right side, and 2+ on the left side.        Dorsalis pedis pulses are 2+ on the right side, and 2+ on the left side.        Posterior tibial pulses are 2+ on the right side, and 2+ on the left side.   No edema BLE.   Pulmonary/Chest: Effort normal and breath sounds normal.   Abdominal: Normal appearance and bowel sounds are normal.   Neurological: She is alert and oriented to person, place, and time.   Skin: Skin is warm, dry and intact.   Psychiatric: She has a normal mood and affect. Her speech is normal and behavior is normal. Judgment and thought content normal. Cognition and memory are normal.       Procedure     ECG 12 Lead  Date/Time: 2/27/2020 9:11 AM  Performed by: Mary Guthrie APRN  Authorized by: Mary Guthrie APRN   Comparison: compared with previous ECG from 10/3/2019  Similar to previous ECG  Rhythm: sinus rhythm  Rate: normal  BPM: 64  QRS axis: normal    Clinical impression: normal ECG  Comments: QT/QTc 439/449                 Assessment/Plan      Diagnosis Plan   1. Essential hypertension  Magnesium    ECG 12 Lead   2. PAC (premature atrial contraction)  ECG 12 Lead   3. Palpitations  Magnesium    ECG 12 Lead   4. Supraventricular tachycardia (CMS/HCC)  Magnesium    ECG 12 Lead   5. Dyslipidemia     6. Healthcare maintenance  Magnesium   7. SVT (supraventricular tachycardia) (CMS/HCC)  ECG 12 Lead       Return in about 14 weeks " (around 6/4/2020).    Hypertension-patient's on atenolol and lisinopril and doing well.  PACs/palpitations/SVT's-patient will get magnesium level today.  She is going to continue her atenolol and flecainide for now and she will see EP tomorrow.  Hyperlipidemia-patient is on pravastatin and monitored by PCP.  She will continue her medication regimen for now.  She will follow-up in 14 weeks or sooner if any changes.    Justina Swiftmarie  reports that she quit smoking about 18 years ago. Her smoking use included cigarettes. She has a 12.50 pack-year smoking history. She has never used smokeless tobacco..        Patient's Body mass index is 29.05 kg/m². BMI is above normal parameters. Recommendations include: educational material and referral to primary care.      Electronically signed by:

## 2020-02-27 NOTE — PATIENT INSTRUCTIONS
"Fat and Cholesterol Restricted Eating Plan  Getting too much fat and cholesterol in your diet may cause health problems. Choosing the right foods helps keep your fat and cholesterol at normal levels. This can keep you from getting certain diseases.  Your doctor may recommend an eating plan that includes:  · Total fat: ______% or less of total calories a day.  · Saturated fat: ______% or less of total calories a day.  · Cholesterol: less than _________mg a day.  · Fiber: ______g a day.  What are tips for following this plan?  Meal planning  · At meals, divide your plate into four equal parts:  ? Fill one-half of your plate with vegetables and green salads.  ? Fill one-fourth of your plate with whole grains.  ? Fill one-fourth of your plate with low-fat (lean) protein foods.  · Eat fish that is high in omega-3 fats at least two times a week. This includes mackerel, tuna, sardines, and salmon.  · Eat foods that are high in fiber, such as whole grains, beans, apples, broccoli, carrots, peas, and barley.  General tips    · Work with your doctor to lose weight if you need to.  · Avoid:  ? Foods with added sugar.  ? Fried foods.  ? Foods with partially hydrogenated oils.  · Limit alcohol intake to no more than 1 drink a day for nonpregnant women and 2 drinks a day for men. One drink equals 12 oz of beer, 5 oz of wine, or 1½ oz of hard liquor.  Reading food labels  · Check food labels for:  ? Trans fats.  ? Partially hydrogenated oils.  ? Saturated fat (g) in each serving.  ? Cholesterol (mg) in each serving.  ? Fiber (g) in each serving.  · Choose foods with healthy fats, such as:  ? Monounsaturated fats.  ? Polyunsaturated fats.  ? Omega-3 fats.  · Choose grain products that have whole grains. Look for the word \"whole\" as the first word in the ingredient list.  Cooking  · Cook foods using low-fat methods. These include baking, boiling, grilling, and broiling.  · Eat more home-cooked foods. Eat at restaurants and buffets " less often.  · Avoid cooking using saturated fats, such as butter, cream, palm oil, palm kernel oil, and coconut oil.  Recommended foods    Fruits  · All fresh, canned (in natural juice), or frozen fruits.  Vegetables  · Fresh or frozen vegetables (raw, steamed, roasted, or grilled). Green salads.  Grains  · Whole grains, such as whole wheat or whole grain breads, crackers, cereals, and pasta. Unsweetened oatmeal, bulgur, barley, quinoa, or brown rice. Corn or whole wheat flour tortillas.  Meats and other protein foods  · Ground beef (85% or leaner), grass-fed beef, or beef trimmed of fat. Skinless chicken or turkey. Ground chicken or turkey. Pork trimmed of fat. All fish and seafood. Egg whites. Dried beans, peas, or lentils. Unsalted nuts or seeds. Unsalted canned beans. Nut butters without added sugar or oil.  Dairy  · Low-fat or nonfat dairy products, such as skim or 1% milk, 2% or reduced-fat cheeses, low-fat and fat-free ricotta or cottage cheese, or plain low-fat and nonfat yogurt.  Fats and oils  · Tub margarine without trans fats. Light or reduced-fat mayonnaise and salad dressings. Avocado. Olive, canola, sesame, or safflower oils.  The items listed above may not be a complete list of foods and beverages you can eat. Contact a dietitian for more information.  Foods to avoid  Fruits  · Canned fruit in heavy syrup. Fruit in cream or butter sauce. Fried fruit.  Vegetables  · Vegetables cooked in cheese, cream, or butter sauce. Fried vegetables.  Grains  · White bread. White pasta. White rice. Cornbread. Bagels, pastries, and croissants. Crackers and snack foods that contain trans fat and hydrogenated oils.  Meats and other protein foods  · Fatty cuts of meat. Ribs, chicken wings, sosa, sausage, bologna, salami, chitterlings, fatback, hot dogs, bratwurst, and packaged lunch meats. Liver and organ meats. Whole eggs and egg yolks. Chicken and turkey with skin. Fried meat.  Dairy  · Whole or 2% milk, cream,  half-and-half, and cream cheese. Whole milk cheeses. Whole-fat or sweetened yogurt. Full-fat cheeses. Nondairy creamers and whipped toppings. Processed cheese, cheese spreads, and cheese curds.  Beverages  · Alcohol. Sugar-sweetened drinks such as sodas, lemonade, and fruit drinks.  Fats and oils  · Butter, stick margarine, lard, shortening, ghee, or sosa fat. Coconut, palm kernel, and palm oils.  Sweets and desserts  · Corn syrup, sugars, honey, and molasses. Candy. Jam and jelly. Syrup. Sweetened cereals. Cookies, pies, cakes, donuts, muffins, and ice cream.  The items listed above may not be a complete list of foods and beverages you should avoid. Contact a dietitian for more information.  Summary  · Choosing the right foods helps keep your fat and cholesterol at normal levels. This can keep you from getting certain diseases.  · At meals, fill one-half of your plate with vegetables and green salads.  · Eat high-fiber foods, like whole grains, beans, apples, carrots, peas, and barley.  · Limit added sugar, saturated fats, alcohol, and fried foods.  This information is not intended to replace advice given to you by your health care provider. Make sure you discuss any questions you have with your health care provider.  Document Released: 06/18/2013 Document Revised: 08/21/2019 Document Reviewed: 09/04/2018  [employer] Interactive Patient Education © 2020 [employer]  BMI for Adults    Body mass index (BMI) is a number that is calculated from a person's weight and height. BMI may help to estimate how much of a person's weight is composed of fat. BMI can help identify those who may be at higher risk for certain medical problems.  How is BMI used with adults?  BMI is used as a screening tool to identify possible weight problems. It is used to check whether a person is obese, overweight, healthy weight, or underweight.  How is BMI calculated?  BMI measures your weight and compares it to your height. This can be done  "either in English ([place] or metric measurements. Note that charts are available to help you find your BMI quickly and easily without having to do these calculations yourself.  To calculate your BMI in English ([place] measurements, your health care provider will:  1. Measure your weight in pounds (lb).  2. Multiply the number of pounds by 703.  ? For example, for a person who weighs 180 lb, multiply that number by 703, which equals 126,540.  3. Measure your height in inches (in). Then multiply that number by itself to get a measurement called \"inches squared.\"  ? For example, for a person who is 70 in tall, the \"inches squared\" measurement is 70 in x 70 in, which equals 4900 inches squared.  4. Divide the total from Step 2 (number of lb x 703) by the total from Step 3 (inches squared): 126,540 ÷ 4900 = 25.8. This is your BMI.  To calculate your BMI in metric measurements, your health care provider will:  1. Measure your weight in kilograms (kg).  2. Measure your height in meters (m). Then multiply that number by itself to get a measurement called \"meters squared.\"  ? For example, for a person who is 1.75 m tall, the \"meters squared\" measurement is 1.75 m x 1.75 m, which is equal to 3.1 meters squared.  3. Divide the number of kilograms (your weight) by the meters squared number. In this example: 70 ÷ 3.1 = 22.6. This is your BMI.  How is BMI interpreted?  To interpret your results, your health care provider will use BMI charts to identify whether you are underweight, normal weight, overweight, or obese. The following guidelines will be used:  · Underweight: BMI less than 18.5.  · Normal weight: BMI between 18.5 and 24.9.  · Overweight: BMI between 25 and 29.9.  · Obese: BMI of 30 and above.  Please note:  · Weight includes both fat and muscle, so someone with a muscular build, such as an athlete, may have a BMI that is higher than 24.9. In cases like these, BMI is not an accurate measure of body fat.  · To determine " if excess body fat is the cause of a BMI of 25 or higher, further assessments may need to be done by a health care provider.  · BMI is usually interpreted in the same way for men and women.  Why is BMI a useful tool?  BMI is useful in two ways:  · Identifying a weight problem that may be related to a medical condition, or that may increase the risk for medical problems.  · Promoting lifestyle and diet changes in order to reach a healthy weight.  Summary  · Body mass index (BMI) is a number that is calculated from a person's weight and height.  · BMI may help to estimate how much of a person's weight is composed of fat. BMI can help identify those who may be at higher risk for certain medical problems.  · BMI can be measured using English measurements or metric measurements.  · To interpret your results, your health care provider will use BMI charts to identify whether you are underweight, normal weight, overweight, or obese.  This information is not intended to replace advice given to you by your health care provider. Make sure you discuss any questions you have with your health care provider.  Document Released: 08/29/2005 Document Revised: 10/31/2018 Document Reviewed: 10/31/2018  [employer] Interactive Patient Education © 2020 [employer]    Palpitations  Palpitations are feelings that your heartbeat is irregular or is faster than normal. It may feel like your heart is fluttering or skipping a beat. Palpitations are usually not a serious problem. They may be caused by many things, including smoking, caffeine, alcohol, stress, and certain medicines or drugs. Most causes of palpitations are not serious. However, some palpitations can be a sign of a serious problem. You may need further tests to rule out serious medical problems.  Follow these instructions at home:         Pay attention to any changes in your condition. Take these actions to help manage your symptoms:  Eating and drinking  · Avoid foods and drinks  that may cause palpitations. These may include:  ? Caffeinated coffee, tea, soft drinks, diet pills, and energy drinks.  ? Chocolate.  ? Alcohol.  Lifestyle  · Take steps to reduce your stress and anxiety. Things that can help you relax include:  ? Yoga.  ? Mind-body activities, such as deep breathing, meditation, or using words and images to create positive thoughts (guided imagery).  ? Physical activity, such as swimming, jogging, or walking. Tell your health care provider if your palpitations increase with activity. If you have chest pain or shortness of breath with activity, do not continue the activity until you are seen by your health care provider.  ? Biofeedback. This is a method that helps you learn to use your mind to control things in your body, such as your heartbeat.  · Do not use drugs, including cocaine or ecstasy. Do not use marijuana.  · Get plenty of rest and sleep. Keep a regular bed time.  General instructions  · Take over-the-counter and prescription medicines only as told by your health care provider.  · Do not use any products that contain nicotine or tobacco, such as cigarettes and e-cigarettes. If you need help quitting, ask your health care provider.  · Keep all follow-up visits as told by your health care provider. This is important. These may include visits for further testing if palpitations do not go away or get worse.  Contact a health care provider if you:  · Continue to have a fast or irregular heartbeat after 24 hours.  · Notice that your palpitations occur more often.  Get help right away if you:  · Have chest pain or shortness of breath.  · Have a severe headache.  · Feel dizzy or you faint.  Summary  · Palpitations are feelings that your heartbeat is irregular or is faster than normal. It may feel like your heart is fluttering or skipping a beat.  · Palpitations may be caused by many things, including smoking, caffeine, alcohol, stress, certain medicines, and drugs.  · Although  most causes of palpitations are not serious, some causes can be a sign of a serious medical problem.  · Get help right away if you faint or have chest pain, shortness of breath, a severe headache, or dizziness.  This information is not intended to replace advice given to you by your health care provider. Make sure you discuss any questions you have with your health care provider.  Document Released: 12/15/2001 Document Revised: 01/30/2019 Document Reviewed: 01/30/2019  [employer] Interactive Patient Education © 2020 [employer]

## 2020-02-28 ENCOUNTER — OFFICE VISIT (OUTPATIENT)
Dept: CARDIOLOGY | Facility: CLINIC | Age: 65
End: 2020-02-28

## 2020-02-28 VITALS
HEART RATE: 73 BPM | HEIGHT: 66 IN | BODY MASS INDEX: 28.61 KG/M2 | DIASTOLIC BLOOD PRESSURE: 70 MMHG | SYSTOLIC BLOOD PRESSURE: 137 MMHG | WEIGHT: 178 LBS

## 2020-02-28 DIAGNOSIS — I49.1 PAC (PREMATURE ATRIAL CONTRACTION): Primary | ICD-10-CM

## 2020-02-28 DIAGNOSIS — I49.3 PVC (PREMATURE VENTRICULAR CONTRACTION): ICD-10-CM

## 2020-02-28 DIAGNOSIS — I10 ESSENTIAL HYPERTENSION: ICD-10-CM

## 2020-02-28 DIAGNOSIS — I47.1 SUPRAVENTRICULAR TACHYCARDIA (HCC): ICD-10-CM

## 2020-02-28 DIAGNOSIS — I49.3 PVC (PREMATURE VENTRICULAR CONTRACTION): Primary | ICD-10-CM

## 2020-02-28 PROCEDURE — 93000 ELECTROCARDIOGRAM COMPLETE: CPT | Performed by: INTERNAL MEDICINE

## 2020-02-28 PROCEDURE — 99214 OFFICE O/P EST MOD 30 MIN: CPT | Performed by: INTERNAL MEDICINE

## 2020-02-28 RX ORDER — FLECAINIDE ACETATE 100 MG/1
100 TABLET ORAL 2 TIMES DAILY
Qty: 60 TABLET | Refills: 11 | Status: SHIPPED | OUTPATIENT
Start: 2020-02-28 | End: 2021-01-04 | Stop reason: SDUPTHER

## 2020-02-28 NOTE — PROGRESS NOTES
Justina Ch  1955  [phone]    02/28/2020    [facility] CARDIOLOGY     Ramy, Emma Walsh, APRN  [address] [place] [address]    Chief Complaint   Patient presents with   • SUPRAVENTRICULAR TACHYCARDIA       Problem List:  1. Supraventricular tachycardia:   a. History of tachypalpitations and emergency room visits dating back to at least 2-3 years ago.   b. Emergency room visit in July with subsequent hospitalization for supraventricular tachycardia at [facility] for initiation of flecainide.   c. Event recorder, July 2014, demonstrating runs of narrow QRS tachycardia, as fast as 190-200 beats per minute.   d. Echocardiogram, 07/21/2014: Normal left ventricular size and function with ejection fraction of 55%. No significant valvular insufficiency.   e. [facility], database normal, per patient, database incomplete.  f. Status post EP study and RFA of left atrial tachycardia and right atrial isthmus-dependent flutter on 10/04/2014.  g. Recurrent tachypalpitations with event monitor, 01/23/2015 thru 02/21/2015, showing sinus rhythm with occasional PAC and sinus arrhythmia and no SVT or atrial fibrillation.   h. Initiation of Tambocor therapy for suppression of PACs, 02/27/2015.   i. Echo 7/12/17 EF 65%, trace TR  j. Nuclear stress test 7/12/17 EF 75%, no ischemia  k. Echocardiogram 10/2/18: EF 60%, no significant valvular disease  l. Event monitor 11/19/18: Predominately NSR, <1% PAC's/PVC's  2. Grave’s disease, status post ablation with hypothyroidism.   3. Dyslipidemia.   4. Hypertension.   5. Skin cancer.   6. Surgical history:  a. D and C.   b. Skin excision for skin cancer.  Allergies  No Known Allergies    Current Medications    Current Outpatient Medications:   •  aspirin 81 MG tablet, Take 1 tablet by mouth daily., Disp: , Rfl:   •  atenolol (TENORMIN) 25 MG tablet, Take 1/2 tab once daily, Disp: 15 tablet, Rfl: 11  •  cetirizine  "(zyrTEC) 10 MG tablet, TAKE 1 TABLET BY MOUTH DAILY, Disp: 30 tablet, Rfl: 2  •  flecainide (TAMBOCOR) 150 MG tablet, TAKE 1/2 TABLET BY MOUTH TWICE DAILY, Disp: 90 tablet, Rfl: 1  •  levothyroxine (SYNTHROID, LEVOTHROID) 137 MCG tablet, TAKE 1 TABLET BY MOUTH DAILY, Disp: 90 tablet, Rfl: 1  •  lisinopril (PRINIVIL,ZESTRIL) 2.5 MG tablet, TAKE 1 TABLET BY MOUTH EVERY DAY, Disp: 30 tablet, Rfl: 6  •  nitroglycerin (NITROSTAT) 0.4 MG SL tablet, 1 under the tongue as needed for angina, may repeat q5mins for up three doses, Disp: 30 tablet, Rfl: 3  •  pravastatin (PRAVACHOL) 40 MG tablet, TAKE 1 TABLET BY MOUTH EVERY NIGHT, Disp: 30 tablet, Rfl: 5    History of Present Illness     Pt presents for follow up of SVT. Since we last saw the pt, pt has been hang more palps over past few months. Short lived lasting seconds assoc with mild fatigue. No change in caffeine or other diet + insomnia. Denies any SOB, CP, LH, and dizziness. Denies any hospitalizations, ER visits, bleeding, or TIA/CVA symptoms. Overall feels well. BP stable at home. Under a lot of stress recently taking care of her mother. Waks daily on [employer]. Recent blood work NL    ROS:  General:  + mildfatigue, + weight 10 lb loss  Cardiovascular:  Denies CP, PND, syncope, near syncope, edema + palpitations.  Pulmonary:  Denies KIRKPATRICK, cough, or wheezing      Vitals:    02/28/20 1010   BP: 137/70   BP Location: Left arm   Patient Position: Sitting   Pulse: 73   Weight: 80.7 kg (178 lb)   Height: 167.6 cm (66\")     Body mass index is 28.73 kg/m².  PE:  General: NAD  Neck: no JVD, no carotid bruits, no TM  Heart RRR, NL S1, S2, S4 present, no rubs, murmurs  Lungs: CTA, no wheezes, rhonchi, or rales  Abd: soft, non-tender, NL BS  Ext: No musculoskeletal deformities, no edema, cyanosis, or clubbing  Psych: normal mood and affect    Diagnostic Data:        ECG 12 Lead  Date/Time: 2/28/2020 10:37 AM  Performed by: Jose Cage MD  Authorized by: Jose Cage, " MD   Comparison: compared with previous ECG from 10/3/2019  Similar to previous ECG  Rhythm: sinus rhythm  BPM: 72              1. PAC (premature atrial contraction)    2. PVC (premature ventricular contraction)    3. Supraventricular tachycardia (CMS/HCC)    4. Essential hypertension          Plan:  1) PAC's: slightly more frequent. Will increase Flecainide 100mg bid.  12 lead ekg 48 hrs later  2) AT/AFL:  - S/p RFA Doing well, No recurrence.    3) Anticoagulation:  - Continue ASA  4) HTN:  - Well controlled on meds  - Wt loss, exercise, salt reduction    F/up in 6 months

## 2020-03-03 ENCOUNTER — CLINICAL SUPPORT (OUTPATIENT)
Dept: FAMILY MEDICINE CLINIC | Facility: CLINIC | Age: 65
End: 2020-03-03

## 2020-03-03 DIAGNOSIS — I49.1 PAC (PREMATURE ATRIAL CONTRACTION): ICD-10-CM

## 2020-03-03 DIAGNOSIS — I47.1 SUPRAVENTRICULAR TACHYCARDIA (HCC): Primary | ICD-10-CM

## 2020-03-03 PROCEDURE — 93000 ELECTROCARDIOGRAM COMPLETE: CPT | Performed by: NURSE PRACTITIONER

## 2020-03-09 DIAGNOSIS — R09.82 PND (POST-NASAL DRIP): ICD-10-CM

## 2020-03-09 DIAGNOSIS — J30.2 SEASONAL ALLERGIC RHINITIS: ICD-10-CM

## 2020-03-09 RX ORDER — CETIRIZINE HYDROCHLORIDE 10 MG/1
10 TABLET ORAL DAILY
Qty: 30 TABLET | Refills: 2 | Status: SHIPPED | OUTPATIENT
Start: 2020-03-09 | End: 2020-06-08

## 2020-04-07 ENCOUNTER — TELEPHONE (OUTPATIENT)
Dept: FAMILY MEDICINE CLINIC | Facility: CLINIC | Age: 65
End: 2020-04-07

## 2020-04-07 NOTE — TELEPHONE ENCOUNTER
LM for pt to call re: referral information.  If pt returns call, please warm transfer to the office to speak with me.

## 2020-04-08 DIAGNOSIS — I47.1 SVT (SUPRAVENTRICULAR TACHYCARDIA) (HCC): ICD-10-CM

## 2020-04-08 DIAGNOSIS — E03.9 ACQUIRED HYPOTHYROIDISM: ICD-10-CM

## 2020-04-08 RX ORDER — LEVOTHYROXINE SODIUM 137 UG/1
137 TABLET ORAL DAILY
Qty: 90 TABLET | Refills: 1 | Status: SHIPPED | OUTPATIENT
Start: 2020-04-08 | End: 2020-09-24 | Stop reason: SDUPTHER

## 2020-04-08 RX ORDER — ATENOLOL 25 MG/1
TABLET ORAL
Qty: 45 TABLET | Refills: 3 | Status: SHIPPED | OUTPATIENT
Start: 2020-04-08 | End: 2021-03-19

## 2020-04-08 RX ORDER — FLECAINIDE ACETATE 150 MG/1
TABLET ORAL
Qty: 90 TABLET | Refills: 1 | OUTPATIENT
Start: 2020-04-08

## 2020-04-20 ENCOUNTER — TELEPHONE (OUTPATIENT)
Dept: FAMILY MEDICINE CLINIC | Facility: CLINIC | Age: 65
End: 2020-04-20

## 2020-04-20 DIAGNOSIS — L98.9 SKIN LESIONS: Primary | ICD-10-CM

## 2020-04-20 NOTE — TELEPHONE ENCOUNTER
Pt called. Sts she has some skin lesions that she is concerned about.  Wants to know if she would need to be seen by someone here or if we can do a Derm referral. Please advise.

## 2020-04-20 NOTE — TELEPHONE ENCOUNTER
As I am aware of, we are not doing any procedures in the office right now, so yes, we will send her to Derm.

## 2020-06-07 DIAGNOSIS — R09.82 PND (POST-NASAL DRIP): ICD-10-CM

## 2020-06-07 DIAGNOSIS — J30.2 SEASONAL ALLERGIC RHINITIS: ICD-10-CM

## 2020-06-08 RX ORDER — CETIRIZINE HYDROCHLORIDE 10 MG/1
10 TABLET ORAL DAILY
Qty: 30 TABLET | Refills: 2 | Status: SHIPPED | OUTPATIENT
Start: 2020-06-08 | End: 2020-09-24 | Stop reason: SDUPTHER

## 2020-06-10 ENCOUNTER — TELEPHONE (OUTPATIENT)
Dept: FAMILY MEDICINE CLINIC | Facility: CLINIC | Age: 65
End: 2020-06-10

## 2020-06-10 NOTE — TELEPHONE ENCOUNTER
Pt is scheduled for a bilat breast MRI @ [facility] on 6/23. Per [facility], she needs to have a bun/creatinine drawn within 30 days of her appt date.  Pt does have a fu appt scheduled with Sonali for 6/25.  Please contact patient when lab order has been entered and she will come to the office to complete before her MRI appointment.

## 2020-06-11 DIAGNOSIS — I10 ESSENTIAL HYPERTENSION: Primary | ICD-10-CM

## 2020-06-12 NOTE — TELEPHONE ENCOUNTER
LM notifying patient that order was entered for her to come to office & complete at her convenience before her MRI appointment.

## 2020-06-15 ENCOUNTER — LAB (OUTPATIENT)
Dept: FAMILY MEDICINE CLINIC | Facility: CLINIC | Age: 65
End: 2020-06-15

## 2020-06-16 ENCOUNTER — RESULTS ENCOUNTER (OUTPATIENT)
Dept: FAMILY MEDICINE CLINIC | Facility: CLINIC | Age: 65
End: 2020-06-16

## 2020-06-16 DIAGNOSIS — I10 ESSENTIAL HYPERTENSION: ICD-10-CM

## 2020-06-16 LAB
BUN SERPL-MCNC: 15 MG/DL (ref 8–23)
BUN/CREAT SERPL: 18.1 (ref 7–25)
CALCIUM SERPL-MCNC: 8.9 MG/DL (ref 8.6–10.5)
CHLORIDE SERPL-SCNC: 107 MMOL/L (ref 98–107)
CO2 SERPL-SCNC: 27 MMOL/L (ref 22–29)
CREAT SERPL-MCNC: 0.83 MG/DL (ref 0.57–1)
GLUCOSE SERPL-MCNC: 86 MG/DL (ref 65–99)
POTASSIUM SERPL-SCNC: 3.8 MMOL/L (ref 3.5–5.2)
SODIUM SERPL-SCNC: 143 MMOL/L (ref 136–145)

## 2020-06-22 ENCOUNTER — TELEPHONE (OUTPATIENT)
Dept: FAMILY MEDICINE CLINIC | Facility: CLINIC | Age: 65
End: 2020-06-22

## 2020-06-29 ENCOUNTER — TELEPHONE (OUTPATIENT)
Dept: FAMILY MEDICINE CLINIC | Facility: CLINIC | Age: 65
End: 2020-06-29

## 2020-06-29 NOTE — TELEPHONE ENCOUNTER
Patient states that she had an MRI on her breast last week and wondered if the test results were in.  She can be reached at [phone]

## 2020-06-30 ENCOUNTER — OFFICE VISIT (OUTPATIENT)
Dept: CARDIOLOGY | Facility: CLINIC | Age: 65
End: 2020-06-30

## 2020-06-30 VITALS
BODY MASS INDEX: 28.93 KG/M2 | OXYGEN SATURATION: 97 % | SYSTOLIC BLOOD PRESSURE: 119 MMHG | WEIGHT: 180 LBS | DIASTOLIC BLOOD PRESSURE: 75 MMHG | HEART RATE: 69 BPM | HEIGHT: 66 IN

## 2020-06-30 DIAGNOSIS — I49.1 PAC (PREMATURE ATRIAL CONTRACTION): ICD-10-CM

## 2020-06-30 DIAGNOSIS — E78.5 DYSLIPIDEMIA: ICD-10-CM

## 2020-06-30 DIAGNOSIS — I10 ESSENTIAL HYPERTENSION: Primary | ICD-10-CM

## 2020-06-30 DIAGNOSIS — I47.1 SUPRAVENTRICULAR TACHYCARDIA (HCC): ICD-10-CM

## 2020-06-30 DIAGNOSIS — R00.2 PALPITATIONS: ICD-10-CM

## 2020-06-30 PROCEDURE — 99214 OFFICE O/P EST MOD 30 MIN: CPT | Performed by: NURSE PRACTITIONER

## 2020-06-30 NOTE — PATIENT INSTRUCTIONS
"Fat and Cholesterol Restricted Eating Plan  Getting too much fat and cholesterol in your diet may cause health problems. Choosing the right foods helps keep your fat and cholesterol at normal levels. This can keep you from getting certain diseases.  Your doctor may recommend an eating plan that includes:  · Total fat: ______% or less of total calories a day.  · Saturated fat: ______% or less of total calories a day.  · Cholesterol: less than _________mg a day.  · Fiber: ______g a day.  What are tips for following this plan?  Meal planning  · At meals, divide your plate into four equal parts:  ? Fill one-half of your plate with vegetables and green salads.  ? Fill one-fourth of your plate with whole grains.  ? Fill one-fourth of your plate with low-fat (lean) protein foods.  · Eat fish that is high in omega-3 fats at least two times a week. This includes mackerel, tuna, sardines, and salmon.  · Eat foods that are high in fiber, such as whole grains, beans, apples, broccoli, carrots, peas, and barley.  General tips    · Work with your doctor to lose weight if you need to.  · Avoid:  ? Foods with added sugar.  ? Fried foods.  ? Foods with partially hydrogenated oils.  · Limit alcohol intake to no more than 1 drink a day for nonpregnant women and 2 drinks a day for men. One drink equals 12 oz of beer, 5 oz of wine, or 1½ oz of hard liquor.  Reading food labels  · Check food labels for:  ? Trans fats.  ? Partially hydrogenated oils.  ? Saturated fat (g) in each serving.  ? Cholesterol (mg) in each serving.  ? Fiber (g) in each serving.  · Choose foods with healthy fats, such as:  ? Monounsaturated fats.  ? Polyunsaturated fats.  ? Omega-3 fats.  · Choose grain products that have whole grains. Look for the word \"whole\" as the first word in the ingredient list.  Cooking  · Cook foods using low-fat methods. These include baking, boiling, grilling, and broiling.  · Eat more home-cooked foods. Eat at restaurants and buffets " less often.  · Avoid cooking using saturated fats, such as butter, cream, palm oil, palm kernel oil, and coconut oil.  Recommended foods    Fruits  · All fresh, canned (in natural juice), or frozen fruits.  Vegetables  · Fresh or frozen vegetables (raw, steamed, roasted, or grilled). Green salads.  Grains  · Whole grains, such as whole wheat or whole grain breads, crackers, cereals, and pasta. Unsweetened oatmeal, bulgur, barley, quinoa, or brown rice. Corn or whole wheat flour tortillas.  Meats and other protein foods  · Ground beef (85% or leaner), grass-fed beef, or beef trimmed of fat. Skinless chicken or turkey. Ground chicken or turkey. Pork trimmed of fat. All fish and seafood. Egg whites. Dried beans, peas, or lentils. Unsalted nuts or seeds. Unsalted canned beans. Nut butters without added sugar or oil.  Dairy  · Low-fat or nonfat dairy products, such as skim or 1% milk, 2% or reduced-fat cheeses, low-fat and fat-free ricotta or cottage cheese, or plain low-fat and nonfat yogurt.  Fats and oils  · Tub margarine without trans fats. Light or reduced-fat mayonnaise and salad dressings. Avocado. Olive, canola, sesame, or safflower oils.  The items listed above may not be a complete list of foods and beverages you can eat. Contact a dietitian for more information.  Foods to avoid  Fruits  · Canned fruit in heavy syrup. Fruit in cream or butter sauce. Fried fruit.  Vegetables  · Vegetables cooked in cheese, cream, or butter sauce. Fried vegetables.  Grains  · White bread. White pasta. White rice. Cornbread. Bagels, pastries, and croissants. Crackers and snack foods that contain trans fat and hydrogenated oils.  Meats and other protein foods  · Fatty cuts of meat. Ribs, chicken wings, sosa, sausage, bologna, salami, chitterlings, fatback, hot dogs, bratwurst, and packaged lunch meats. Liver and organ meats. Whole eggs and egg yolks. Chicken and turkey with skin. Fried meat.  Dairy  · Whole or 2% milk, cream,  half-and-half, and cream cheese. Whole milk cheeses. Whole-fat or sweetened yogurt. Full-fat cheeses. Nondairy creamers and whipped toppings. Processed cheese, cheese spreads, and cheese curds.  Beverages  · Alcohol. Sugar-sweetened drinks such as sodas, lemonade, and fruit drinks.  Fats and oils  · Butter, stick margarine, lard, shortening, ghee, or sosa fat. Coconut, palm kernel, and palm oils.  Sweets and desserts  · Corn syrup, sugars, honey, and molasses. Candy. Jam and jelly. Syrup. Sweetened cereals. Cookies, pies, cakes, donuts, muffins, and ice cream.  The items listed above may not be a complete list of foods and beverages you should avoid. Contact a dietitian for more information.  Summary  · Choosing the right foods helps keep your fat and cholesterol at normal levels. This can keep you from getting certain diseases.  · At meals, fill one-half of your plate with vegetables and green salads.  · Eat high-fiber foods, like whole grains, beans, apples, carrots, peas, and barley.  · Limit added sugar, saturated fats, alcohol, and fried foods.  This information is not intended to replace advice given to you by your health care provider. Make sure you discuss any questions you have with your health care provider.  Document Released: 06/18/2013 Document Revised: 08/21/2019 Document Reviewed: 09/04/2018  Elsevier Patient Education © 2020 [employer]  BMI for Adults    Body mass index (BMI) is a number that is calculated from a person's weight and height. BMI may help to estimate how much of a person's weight is composed of fat. BMI can help identify those who may be at higher risk for certain medical problems.  How is BMI used with adults?  BMI is used as a screening tool to identify possible weight problems. It is used to check whether a person is obese, overweight, healthy weight, or underweight.  How is BMI calculated?  BMI measures your weight and compares it to your height. This can be done either in  "English ([place] or metric measurements. Note that charts are available to help you find your BMI quickly and easily without having to do these calculations yourself.  To calculate your BMI in English ([place] measurements, your health care provider will:  1. Measure your weight in pounds (lb).  2. Multiply the number of pounds by 703.  ? For example, for a person who weighs 180 lb, multiply that number by 703, which equals 126,540.  3. Measure your height in inches (in). Then multiply that number by itself to get a measurement called \"inches squared.\"  ? For example, for a person who is 70 in tall, the \"inches squared\" measurement is 70 in x 70 in, which equals 4900 inches squared.  4. Divide the total from Step 2 (number of lb x 703) by the total from Step 3 (inches squared): 126,540 ÷ 4900 = 25.8. This is your BMI.  To calculate your BMI in metric measurements, your health care provider will:  1. Measure your weight in kilograms (kg).  2. Measure your height in meters (m). Then multiply that number by itself to get a measurement called \"meters squared.\"  ? For example, for a person who is 1.75 m tall, the \"meters squared\" measurement is 1.75 m x 1.75 m, which is equal to 3.1 meters squared.  3. Divide the number of kilograms (your weight) by the meters squared number. In this example: 70 ÷ 3.1 = 22.6. This is your BMI.  How is BMI interpreted?  To interpret your results, your health care provider will use BMI charts to identify whether you are underweight, normal weight, overweight, or obese. The following guidelines will be used:  · Underweight: BMI less than 18.5.  · Normal weight: BMI between 18.5 and 24.9.  · Overweight: BMI between 25 and 29.9.  · Obese: BMI of 30 and above.  Please note:  · Weight includes both fat and muscle, so someone with a muscular build, such as an athlete, may have a BMI that is higher than 24.9. In cases like these, BMI is not an accurate measure of body fat.  · To determine if excess " body fat is the cause of a BMI of 25 or higher, further assessments may need to be done by a health care provider.  · BMI is usually interpreted in the same way for men and women.  Why is BMI a useful tool?  BMI is useful in two ways:  · Identifying a weight problem that may be related to a medical condition, or that may increase the risk for medical problems.  · Promoting lifestyle and diet changes in order to reach a healthy weight.  Summary  · Body mass index (BMI) is a number that is calculated from a person's weight and height.  · BMI may help to estimate how much of a person's weight is composed of fat. BMI can help identify those who may be at higher risk for certain medical problems.  · BMI can be measured using English measurements or metric measurements.  · To interpret your results, your health care provider will use BMI charts to identify whether you are underweight, normal weight, overweight, or obese.  This information is not intended to replace advice given to you by your health care provider. Make sure you discuss any questions you have with your health care provider.  Document Released: 08/29/2005 Document Revised: 11/30/2018 Document Reviewed: 10/31/2018  Elsevier Patient Education © 2020 [employer]

## 2020-06-30 NOTE — PROGRESS NOTES
Akash Ch is a 65 y.o. female     Chief Complaint   Patient presents with   • Follow-up     Here for a 4 month follow up       HPI    Problem list:    1. SVT - flecainide therapy  1.1 Event Monitor 5/7-5/20-16 - NSR with PACs and PVCs  1.2  event monitor 11/19-12/2/18-PACs and PVCs  2. Bradycardia  3. Left atrial tachycardia and right atrial isthmus-dependent flutter   3.1 RFA 10/14/2014  4. Grave’s disease, status post ablation with hypothyroidism.   5. Dyslipidemia.   6. Hypertension  6.1 Stress test 7/23/14 - low risk, no ischemia   6.2 Stress Test 7/12/17-no ischemia, low risk  6.3 Echo 7/21/14 - EF 55-60%; DD II; trace MR, TR  6.4 Echo 7/12/17-EF greater than 65%, diastolic dysfunction 1, trace TR, trace MI, PA 20-25  6.5 Echo 10/2/18 - EF 64%; borderline left atrial enlargement   6.6 stress test 8/5/19-no evidence of ischemia, post-rest EF 64%, occasional PAC  7. Cardiomegaly  7.1 CT Chest 8/5/15 - Cardiomegaly; suspected hepatosplenomegaly with fatty liver  8. Carotid Artery Disease   8.1 Carotid Artery US 10/2/18 - mild atherosclerotic was noted involving the carotid systems; antegrade flow both vertebral arteries.    Patient is a 65-year-old female who presents today for a follow-up.  She denies any chest pain or pressure.  She says her fluttering is much much better with the increase in the flecainide.  She denies any dizziness, presyncope, syncope, PND or edema.  She denies any shortness of breath with activity.  She says overall she has felt better.  She has had a couple episodes where she woke up short of breath.  Patient was supposed to go to [place] however because of COVID-19 she was unable to go.  She had a breast MRI due to her increased risk of cancer because of family history and she is awaiting those findings.    Current Outpatient Medications on File Prior to Visit   Medication Sig Dispense Refill   • aspirin 81 MG tablet Take 1 tablet by mouth daily.     • atenolol (TENORMIN)  25 MG tablet TAKE 1/2 TABLET BY MOUTH ONCE DAILY 45 tablet 3   • cetirizine (zyrTEC) 10 MG tablet TAKE 1 TABLET BY MOUTH DAILY 30 tablet 2   • flecainide (TAMBOCOR) 100 MG tablet Take 1 tablet by mouth 2 (Two) Times a Day. 60 tablet 11   • levothyroxine (SYNTHROID, LEVOTHROID) 137 MCG tablet TAKE 1 TABLET BY MOUTH DAILY 90 tablet 1   • lisinopril (PRINIVIL,ZESTRIL) 2.5 MG tablet TAKE 1 TABLET BY MOUTH EVERY DAY 30 tablet 6   • nitroglycerin (NITROSTAT) 0.4 MG SL tablet 1 under the tongue as needed for angina, may repeat q5mins for up three doses 30 tablet 3   • pravastatin (PRAVACHOL) 40 MG tablet TAKE 1 TABLET BY MOUTH EVERY NIGHT 30 tablet 5     No current facility-administered medications on file prior to visit.        ALLERGIES    Patient has no known allergies.    Past Medical History:   Diagnosis Date   • Allergic    • Arrhythmia    • Enlarged heart    • Graves' disease    • Hx of mammogram 2016   • Hx of staphylococcal infection    • Hyperlipidemia    • Hypertension 2016       Social History     Socioeconomic History   • Marital status:      Spouse name: Not on file   • Number of children: Not on file   • Years of education: Not on file   • Highest education level: Not on file   Tobacco Use   • Smoking status: Former Smoker     Packs/day: 0.50     Years: 25.00     Pack years: 12.50     Types: Cigarettes     Last attempt to quit: 2001     Years since quittin.1   • Smokeless tobacco: Never Used   Substance and Sexual Activity   • Alcohol use: No   • Drug use: No   • Sexual activity: Defer       Family History   Problem Relation Age of Onset   • Hypertension Mother    • Breast cancer Mother    • Bone cancer Mother    • Cancer Mother    • Heart attack Father    • Heart disease Father         pacemaker   • Hyperlipidemia Father    • Hypertension Father    • Diabetes Father         Passed away   • Breast cancer Sister    • Ovarian cancer Sister    • Cancer Sister         sister  "passed away   • Diabetes Paternal Grandmother        Review of Systems   Constitutional: Positive for fatigue. Negative for chills and fever.   HENT: Negative for congestion, rhinorrhea and sore throat.    Eyes: Positive for visual disturbance (glasses daily ).   Respiratory: Negative for chest tightness and shortness of breath.    Cardiovascular: Positive for palpitations (flutters; they are much better). Negative for chest pain and leg swelling.   Gastrointestinal: Negative for abdominal pain, blood in stool, nausea and vomiting.   Endocrine: Negative for cold intolerance and heat intolerance.   Genitourinary: Negative for dysuria, frequency, hematuria and urgency.   Musculoskeletal: Negative for arthralgias and back pain.   Skin: Negative for rash and wound.   Allergic/Immunologic: Positive for environmental allergies (seasonal ). Negative for food allergies.   Neurological: Negative for dizziness, weakness and light-headedness.   Hematological: Bruises/bleeds easily (bruises easily ).   Psychiatric/Behavioral: Positive for sleep disturbance (has awaken with smothering a couple of times ).       Objective   /75 (BP Location: Left arm, Patient Position: Sitting)   Pulse 69   Ht 167.6 cm (66\")   Wt 81.6 kg (180 lb)   SpO2 97%   BMI 29.05 kg/m²   Vitals:    06/30/20 0802   BP: 119/75   BP Location: Left arm   Patient Position: Sitting   Pulse: 69   SpO2: 97%   Weight: 81.6 kg (180 lb)   Height: 167.6 cm (66\")      Lab Results (most recent)     None        Physical Exam   Constitutional: She is oriented to person, place, and time. Vital signs are normal. She appears well-developed and well-nourished. She is active and cooperative.   HENT:   Head: Normocephalic.   Eyes: Lids are normal.   Wears glasses    Neck: Normal carotid pulses, no hepatojugular reflux and no JVD present. Carotid bruit is not present.   Cardiovascular: Normal rate and regular rhythm.   Pulses:       Radial pulses are 2+ on the right " side, and 2+ on the left side.        Dorsalis pedis pulses are 2+ on the right side, and 2+ on the left side.        Posterior tibial pulses are 2+ on the right side, and 2+ on the left side.   Pulmonary/Chest: Effort normal and breath sounds normal.   Abdominal: Normal appearance and bowel sounds are normal.   Neurological: She is alert and oriented to person, place, and time.   Skin: Skin is warm, dry and intact.   Psychiatric: She has a normal mood and affect. Her speech is normal and behavior is normal. Judgment and thought content normal. Cognition and memory are normal.       Procedure   Procedures         Assessment/Plan      Diagnosis Plan   1. Essential hypertension     2. PAC (premature atrial contraction)     3. Palpitations     4. Supraventricular tachycardia (CMS/HCC)     5. Dyslipidemia         Return in about 6 months (around 12/30/2020).    Hypertension-patient is doing well on atenolol and lisinopril.  PACs/SVT/palpitations-patient's on beta-blocker and flecainide and doing much better.  Dyslipidemia-patient is on pravastatin and monitored by PCP.  She will continue her medication regimen.  She will follow-up in 6 months or sooner if any changes.       Justina Ch  reports that she quit smoking about 19 years ago. Her smoking use included cigarettes. She has a 12.50 pack-year smoking history. She has never used smokeless tobacco..        Patient's Body mass index is 29.05 kg/m². BMI is above normal parameters. Recommendations include: educational material and referral to primary care.      Electronically signed by:

## 2020-07-07 DIAGNOSIS — I10 ESSENTIAL HYPERTENSION: ICD-10-CM

## 2020-07-07 DIAGNOSIS — E78.5 DYSLIPIDEMIA: ICD-10-CM

## 2020-07-07 RX ORDER — PRAVASTATIN SODIUM 40 MG
40 TABLET ORAL NIGHTLY
Qty: 30 TABLET | Refills: 5 | Status: SHIPPED | OUTPATIENT
Start: 2020-07-07 | End: 2020-12-21

## 2020-07-07 RX ORDER — LISINOPRIL 2.5 MG/1
TABLET ORAL
Qty: 30 TABLET | Refills: 6 | Status: SHIPPED | OUTPATIENT
Start: 2020-07-07 | End: 2020-12-21 | Stop reason: SDUPTHER

## 2020-09-05 DIAGNOSIS — J30.2 SEASONAL ALLERGIC RHINITIS: ICD-10-CM

## 2020-09-05 DIAGNOSIS — R09.82 PND (POST-NASAL DRIP): ICD-10-CM

## 2020-09-08 RX ORDER — CETIRIZINE HYDROCHLORIDE 10 MG/1
10 TABLET ORAL DAILY
Qty: 30 TABLET | Refills: 2 | OUTPATIENT
Start: 2020-09-08

## 2020-09-11 ENCOUNTER — OFFICE VISIT (OUTPATIENT)
Dept: CARDIOLOGY | Facility: CLINIC | Age: 65
End: 2020-09-11

## 2020-09-11 VITALS
OXYGEN SATURATION: 96 % | WEIGHT: 175 LBS | BODY MASS INDEX: 28.12 KG/M2 | SYSTOLIC BLOOD PRESSURE: 108 MMHG | DIASTOLIC BLOOD PRESSURE: 78 MMHG | HEIGHT: 66 IN | HEART RATE: 64 BPM

## 2020-09-11 DIAGNOSIS — I47.1 SUPRAVENTRICULAR TACHYCARDIA (HCC): ICD-10-CM

## 2020-09-11 DIAGNOSIS — I10 ESSENTIAL HYPERTENSION: ICD-10-CM

## 2020-09-11 DIAGNOSIS — I49.1 PAC (PREMATURE ATRIAL CONTRACTION): Primary | ICD-10-CM

## 2020-09-11 DIAGNOSIS — R07.89 OTHER CHEST PAIN: ICD-10-CM

## 2020-09-11 PROCEDURE — 99213 OFFICE O/P EST LOW 20 MIN: CPT | Performed by: INTERNAL MEDICINE

## 2020-09-11 PROCEDURE — 93000 ELECTROCARDIOGRAM COMPLETE: CPT | Performed by: INTERNAL MEDICINE

## 2020-09-11 RX ORDER — NITROGLYCERIN 0.4 MG/1
TABLET SUBLINGUAL
Qty: 30 TABLET | Refills: 3 | Status: SHIPPED | OUTPATIENT
Start: 2020-09-11 | End: 2022-08-03 | Stop reason: SDUPTHER

## 2020-09-11 NOTE — PROGRESS NOTES
Justina Ch  1955  [phone]    09/11/2020    [facility] CARDIOLOGY     Ramy, Emma Walsh, APRN  [address] [place] [address]    Chief Complaint   Patient presents with   • PREMATURE ATRIAL CONTRACTIO       Problem List:  1. Supraventricular tachycardia:   a. History of tachypalpitations and emergency room visits dating back to at least 2-3 years ago.   b. Emergency room visit in July with subsequent hospitalization for supraventricular tachycardia at [facility] for initiation of flecainide.   c. Event recorder, July 2014, demonstrating runs of narrow QRS tachycardia, as fast as 190-200 beats per minute.   d. Echocardiogram, 07/21/2014: Normal left ventricular size and function with ejection fraction of 55%. No significant valvular insufficiency.   e. [facility], database normal, per patient, database incomplete.  f. Status post EP study and RFA of left atrial tachycardia and right atrial isthmus-dependent flutter on 10/04/2014.  g. Recurrent tachypalpitations with event monitor, 01/23/2015 thru 02/21/2015, showing sinus rhythm with occasional PAC and sinus arrhythmia and no SVT or atrial fibrillation.   h. Initiation of Tambocor therapy for suppression of PACs, 02/27/2015.   i. Echo 7/12/17 EF 65%, trace TR  j. Nuclear stress test 7/12/17 EF 75%, no ischemia  k. Echocardiogram 10/2/18: EF 60%, no significant valvular disease  l. Event monitor 11/19/18: Predominately NSR, <1% PAC's/PVC's  2. Grave’s disease, status post ablation with hypothyroidism.   3. Dyslipidemia.   4. Hypertension.   5. Skin cancer.   6. Surgical history:  a. D and C.   b. Skin excision   Allergies  No Known Allergies    Current Medications    Current Outpatient Medications:   •  aspirin 81 MG tablet, Take 1 tablet by mouth daily., Disp: , Rfl:   •  atenolol (TENORMIN) 25 MG tablet, TAKE 1/2 TABLET BY MOUTH ONCE DAILY, Disp: 45 tablet, Rfl: 3  •  cetirizine  "(zyrTEC) 10 MG tablet, TAKE 1 TABLET BY MOUTH DAILY, Disp: 30 tablet, Rfl: 2  •  flecainide (TAMBOCOR) 100 MG tablet, Take 1 tablet by mouth 2 (Two) Times a Day., Disp: 60 tablet, Rfl: 11  •  levothyroxine (SYNTHROID, LEVOTHROID) 137 MCG tablet, TAKE 1 TABLET BY MOUTH DAILY, Disp: 90 tablet, Rfl: 1  •  lisinopril (PRINIVIL,ZESTRIL) 2.5 MG tablet, TAKE 1 TABLET BY MOUTH EVERY DAY, Disp: 30 tablet, Rfl: 6  •  nitroglycerin (NITROSTAT) 0.4 MG SL tablet, 1 under the tongue as needed for angina, may repeat q5mins for up three doses, Disp: 30 tablet, Rfl: 3  •  pravastatin (PRAVACHOL) 40 MG tablet, TAKE 1 TABLET BY MOUTH EVERY NIGHT, Disp: 30 tablet, Rfl: 5    History of Present Illness     Pt presents for follow up of SVT/PAC/PVC. Since we last saw the pt, pt denies any svt/palps, SOB, CP, LH, and dizziness. Denies any hospitalizations, ER visits, bleeding, or TIA/CVA symptoms. Overall feels well.  BP well controlled at home    ROS:  General:  Denies fatigue, weight gain or loss  Cardiovascular:  Denies CP, PND, syncope, near syncope, edema or palpitations.  Pulmonary:  Denies KIRKPATRICK, cough, or wheezing      Vitals:    09/11/20 1428   BP: 108/78   BP Location: Left arm   Patient Position: Sitting   Pulse: 64   SpO2: 96%   Weight: 79.4 kg (175 lb)   Height: 167.6 cm (66\")     Body mass index is 28.25 kg/m².  PE:  General: NAD  Neck: no JVD, no carotid bruits, no TM  Heart RRR, NL S1, S2, S4 present, no rubs, murmurs  Lungs: CTA, no wheezes, rhonchi, or rales  Abd: soft, non-tender, NL BS  Ext: No musculoskeletal deformities, no edema, cyanosis, or clubbing  Psych: normal mood and affect    Diagnostic Data:        ECG 12 Lead  Date/Time: 9/11/2020 2:59 PM  Performed by: Jose Cage MD  Authorized by: Jose Cage MD   Comparison: compared with previous ECG from 3/3/2020  Similar to previous ECG  Rhythm: sinus rhythm  BPM: 64              1. PAC (premature atrial contraction)    2. Supraventricular tachycardia " (CMS/HCC)    3. Essential hypertension          Plan:  1) PAC's: much improved  2) AT/AFL:  - S/p RFA Doing well, No recurrence.    3) Anticoagulation:  - Continue ASA  4) HTN:  - Well controlled on meds  - Wt loss, exercise, salt reduction    F/up in 8 months

## 2020-09-24 ENCOUNTER — OFFICE VISIT (OUTPATIENT)
Dept: FAMILY MEDICINE CLINIC | Facility: CLINIC | Age: 65
End: 2020-09-24

## 2020-09-24 VITALS
HEIGHT: 66 IN | WEIGHT: 178 LBS | SYSTOLIC BLOOD PRESSURE: 122 MMHG | OXYGEN SATURATION: 99 % | DIASTOLIC BLOOD PRESSURE: 70 MMHG | TEMPERATURE: 97.8 F | HEART RATE: 67 BPM | BODY MASS INDEX: 28.61 KG/M2

## 2020-09-24 DIAGNOSIS — I47.1 SUPRAVENTRICULAR TACHYCARDIA (HCC): ICD-10-CM

## 2020-09-24 DIAGNOSIS — J30.2 SEASONAL ALLERGIC RHINITIS: ICD-10-CM

## 2020-09-24 DIAGNOSIS — M79.672 LEFT FOOT PAIN: Primary | ICD-10-CM

## 2020-09-24 DIAGNOSIS — R09.82 PND (POST-NASAL DRIP): ICD-10-CM

## 2020-09-24 DIAGNOSIS — E03.9 ACQUIRED HYPOTHYROIDISM: ICD-10-CM

## 2020-09-24 DIAGNOSIS — I10 ESSENTIAL HYPERTENSION: ICD-10-CM

## 2020-09-24 PROCEDURE — 99214 OFFICE O/P EST MOD 30 MIN: CPT | Performed by: NURSE PRACTITIONER

## 2020-09-24 RX ORDER — CETIRIZINE HYDROCHLORIDE 10 MG/1
10 TABLET ORAL DAILY
Qty: 90 TABLET | Refills: 3 | Status: SHIPPED | OUTPATIENT
Start: 2020-09-24 | End: 2021-09-16

## 2020-09-24 RX ORDER — LEVOTHYROXINE SODIUM 137 UG/1
137 TABLET ORAL DAILY
Qty: 90 TABLET | Refills: 1 | Status: SHIPPED | OUTPATIENT
Start: 2020-09-24 | End: 2021-03-23 | Stop reason: SDUPTHER
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
## 2020-09-28 NOTE — PROGRESS NOTES
I called and spoke with patient on 9/24/2020 about her negative results.  Due to prolonged pain MRI was ordered

## 2020-09-29 ENCOUNTER — TELEPHONE (OUTPATIENT)
Dept: FAMILY MEDICINE CLINIC | Facility: CLINIC | Age: 65
End: 2020-09-29

## 2020-10-05 DIAGNOSIS — M89.9 OSTEOCHONDRAL LESION OF TALAR DOME: Primary | ICD-10-CM

## 2020-10-05 DIAGNOSIS — M94.9 OSTEOCHONDRAL LESION OF TALAR DOME: Primary | ICD-10-CM

## 2020-10-14 ENCOUNTER — OFFICE VISIT (OUTPATIENT)
Dept: ORTHOPEDIC SURGERY | Facility: CLINIC | Age: 65
End: 2020-10-14

## 2020-10-14 DIAGNOSIS — Z53.8 APPOINTMENT CANCELED BY HOSPITAL: Primary | ICD-10-CM

## 2020-10-14 NOTE — PROGRESS NOTES
Erroneous encounter. Patient left without being seen per Dr. Melgar she needed to reschedule to next week and bring her imaging with her.    KW

## 2020-10-14 NOTE — PROGRESS NOTES
NEW PATIENT    Patient: Justina Ch  : 1955    Primary Care Provider: Emma Mccann APRN    Requesting Provider: As above    Pain of the Left Foot      History    Chief Complaint: ***    History of Present Illness: ***    Current Outpatient Medications on File Prior to Visit   Medication Sig Dispense Refill   • aspirin 81 MG tablet Take 1 tablet by mouth daily.     • atenolol (TENORMIN) 25 MG tablet TAKE 1/2 TABLET BY MOUTH ONCE DAILY 45 tablet 3   • cetirizine (zyrTEC) 10 MG tablet Take 1 tablet by mouth Daily. 90 tablet 3   • flecainide (TAMBOCOR) 100 MG tablet Take 1 tablet by mouth 2 (Two) Times a Day. 60 tablet 11   • levothyroxine (SYNTHROID, LEVOTHROID) 137 MCG tablet Take 1 tablet by mouth Daily. 90 tablet 1   • lisinopril (PRINIVIL,ZESTRIL) 2.5 MG tablet TAKE 1 TABLET BY MOUTH EVERY DAY 30 tablet 6   • nitroglycerin (NITROSTAT) 0.4 MG SL tablet 1 under the tongue as needed for angina, may repeat q5mins for up three doses 30 tablet 3   • pravastatin (PRAVACHOL) 40 MG tablet TAKE 1 TABLET BY MOUTH EVERY NIGHT 30 tablet 5     No current facility-administered medications on file prior to visit.       No Known Allergies   Past Medical History:   Diagnosis Date   • Allergic    • Arrhythmia    • Enlarged heart    • Graves' disease    • Hx of mammogram 2016   • Hx of staphylococcal infection    • Hyperlipidemia    • Hypertension 2016     Past Surgical History:   Procedure Laterality Date   • ABLATION OF DYSRHYTHMIC FOCUS     • CARDIAC CATHETERIZATION      his ablation   • CATARACT EXTRACTION, BILATERAL     • COLONOSCOPY  2013   • DILATATION AND CURETTAGE      of cervical stump   • LASIK  2020    left eye    • OTHER SURGICAL HISTORY      facial surgery     Family History   Problem Relation Age of Onset   • Hypertension Mother    • Breast cancer Mother    • Bone cancer Mother    • Cancer Mother    • Heart attack Father    • Heart disease Father         pacemaker   •  "Hyperlipidemia Father    • Hypertension Father    • Diabetes Father         Passed away   • Breast cancer Sister    • Ovarian cancer Sister    • Cancer Sister         sister passed away   • Diabetes Paternal Grandmother       Social History     Socioeconomic History   • Marital status:      Spouse name: Not on file   • Number of children: Not on file   • Years of education: Not on file   • Highest education level: Not on file   Tobacco Use   • Smoking status: Former Smoker     Packs/day: 0.50     Years: 25.00     Pack years: 12.50     Types: Cigarettes     Quit date: 2001     Years since quittin.4   • Smokeless tobacco: Never Used   Substance and Sexual Activity   • Alcohol use: No   • Drug use: No   • Sexual activity: Defer        Review of Systems   Constitutional: Negative.    HENT: Negative.    Eyes: Negative.    Respiratory: Negative.    Cardiovascular: Positive for leg swelling.   Gastrointestinal: Negative.    Endocrine: Negative.    Genitourinary: Negative.    Musculoskeletal: Positive for arthralgias and joint swelling.   Skin: Negative.    Allergic/Immunologic: Positive for environmental allergies.   Neurological: Negative.    Hematological: Negative.    Psychiatric/Behavioral: Negative.        The following portions of the patient's history were reviewed and updated as appropriate: allergies, current medications, past family history, past medical history, past social history, past surgical history and problem list.    Physical Exam:   Pulse 69   Ht 167.6 cm (65.98\")   Wt 80.7 kg (177 lb 14.6 oz)   SpO2 98%   BMI 28.73 kg/m²   GENERAL: Body habitus: {body habitus:08693}    Lower extremity edema: Right: none; Left: none    Varicose veins:  Right: none; Left: none    Gait: normal     Mental Status:  {mental status:41522}    Voice:  {Voice quality:82734}  SKIN:  Lower extremity: Normal    Hair Growth(lower extremity):  Right:normal; Left:  normal  NAILS: Toenails: normal  HEENT: {Exam; " heent:08750}  PULM:  Repiratory effort normal  CV:  Dorsalis Pedis:  Right: {degnore pulses:28125}; Left:{degnore pulses:94939}    Posterior Tibial: Right:{degnore pulses:21889}; Left:{degnore pulses:24526}    Capillary Refill:  {Blank single:44238}  MSK:  Hand:{hand exam:94478}      Tibia:  Right:  {tibia exam:01823}; Left:  {tibia exam:57905}      Ankle:  Right: {foot/ankle exam:47965}; Left:  {foot/ankle exam:25420}      Foot:  Right:  {foot/ankle exam:94502}; Left:  {foot/ankle exam:31833}      NEURO: Heel Walking:  Right:  {normal and wild card:25778}; Left:  {normal and     wild card:92231}    Toe Walking:  Right:  {toe walkin}; Left:  {toe walkin}     [place] 5.07 monofilament test: {semmes-rich:74171}    Lower extremity sensation: {Sensation:04211}     Reflexes:  Biceps:  Right:  {reflexes:94961}; Left:  {reflexes:21584}           Quads:  Right:  {reflexes:85268}; Left:  {reflexes:98973}           Ankle:  Right:  {reflexes:13113}; Left:  {reflexes:20563}      Calf Atrophy:{normal and wild card:93507}    Motor Function: {motor:53640}         Medical Decision Making    Data Review:   {Data Review:43664}    Assessment and Plan/ Diagnosis/Treatment options:   There are no diagnoses linked to this encounter.    ***      Radiology Ordered []  Radiology Reports Reviewed []      Radiology Images Reviewed []   Labs Reviewed []    Labs Ordered []   PCP Records Reviewed []    Provider Records Reviewed []    ER Records Reviewed []    Hospital Records Reviewed []    History Obtained From Family []    Phone conversation with Provider []    Records Requested []

## 2020-10-19 ENCOUNTER — OFFICE VISIT (OUTPATIENT)
Dept: ORTHOPEDIC SURGERY | Facility: CLINIC | Age: 65
End: 2020-10-19

## 2020-10-19 VITALS — WEIGHT: 177.91 LBS | BODY MASS INDEX: 28.59 KG/M2 | HEIGHT: 66 IN | HEART RATE: 77 BPM | OXYGEN SATURATION: 98 %

## 2020-10-19 DIAGNOSIS — G89.29 CHRONIC PAIN OF LEFT ANKLE: Primary | ICD-10-CM

## 2020-10-19 DIAGNOSIS — S89.92XS INJURY OF LEFT LOWER EXTREMITY, SEQUELA: ICD-10-CM

## 2020-10-19 DIAGNOSIS — M25.572 CHRONIC PAIN OF LEFT ANKLE: Primary | ICD-10-CM

## 2020-10-19 PROCEDURE — 99204 OFFICE O/P NEW MOD 45 MIN: CPT | Performed by: ORTHOPAEDIC SURGERY
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
## 2020-12-19 DIAGNOSIS — E78.5 DYSLIPIDEMIA: ICD-10-CM

## 2020-12-21 DIAGNOSIS — I10 ESSENTIAL HYPERTENSION: ICD-10-CM

## 2020-12-21 RX ORDER — PRAVASTATIN SODIUM 40 MG
40 TABLET ORAL NIGHTLY
Qty: 30 TABLET | Refills: 5 | Status: SHIPPED | OUTPATIENT
Start: 2020-12-21 | End: 2021-04-14

## 2020-12-21 RX ORDER — LISINOPRIL 2.5 MG/1
2.5 TABLET ORAL DAILY
Qty: 30 TABLET | Refills: 6 | Status: SHIPPED | OUTPATIENT
Start: 2020-12-21 | End: 2021-06-17 | Stop reason: SDUPTHER

## 2021-01-04 ENCOUNTER — OFFICE VISIT (OUTPATIENT)
Dept: CARDIOLOGY | Facility: CLINIC | Age: 66
End: 2021-01-04

## 2021-01-04 VITALS
DIASTOLIC BLOOD PRESSURE: 76 MMHG | HEART RATE: 68 BPM | SYSTOLIC BLOOD PRESSURE: 124 MMHG | HEIGHT: 66 IN | BODY MASS INDEX: 29.09 KG/M2 | OXYGEN SATURATION: 95 % | WEIGHT: 181 LBS

## 2021-01-04 DIAGNOSIS — E78.5 DYSLIPIDEMIA: ICD-10-CM

## 2021-01-04 DIAGNOSIS — I49.3 PVC (PREMATURE VENTRICULAR CONTRACTION): ICD-10-CM

## 2021-01-04 DIAGNOSIS — I47.1 SUPRAVENTRICULAR TACHYCARDIA (HCC): Primary | ICD-10-CM

## 2021-01-04 DIAGNOSIS — I10 ESSENTIAL HYPERTENSION: ICD-10-CM

## 2021-01-04 DIAGNOSIS — R00.2 PALPITATIONS: ICD-10-CM

## 2021-01-04 DIAGNOSIS — I65.23 BILATERAL CAROTID ARTERY STENOSIS: ICD-10-CM

## 2021-01-04 DIAGNOSIS — R42 DIZZINESS: ICD-10-CM

## 2021-01-04 DIAGNOSIS — I49.1 PAC (PREMATURE ATRIAL CONTRACTION): ICD-10-CM

## 2021-01-04 PROCEDURE — 99214 OFFICE O/P EST MOD 30 MIN: CPT | Performed by: NURSE PRACTITIONER

## 2021-01-04 RX ORDER — FLECAINIDE ACETATE 100 MG/1
100 TABLET ORAL 2 TIMES DAILY
Qty: 180 TABLET | Refills: 3 | Status: SHIPPED | OUTPATIENT
Start: 2021-01-04 | End: 2022-01-05

## 2021-01-04 NOTE — PATIENT INSTRUCTIONS
"BMI for Adults  What is BMI?  Body mass index (BMI) is a number that is calculated from a person's weight and height. BMI can help estimate how much of a person's weight is composed of fat. BMI does not measure body fat directly. Rather, it is an alternative to procedures that directly measure body fat, which can be difficult and expensive.  BMI can help identify people who may be at higher risk for certain medical problems.  What are BMI measurements used for?  BMI is used as a screening tool to identify possible weight problems. It helps determine whether a person is obese, overweight, a healthy weight, or underweight.  BMI is useful for:  · Identifying a weight problem that may be related to a medical condition or may increase the risk for medical problems.  · Promoting changes, such as changes in diet and exercise, to help reach a healthy weight. BMI screening can be repeated to see if these changes are working.  How is BMI calculated?  BMI involves measuring your weight in relation to your height. Both height and weight are measured, and the BMI is calculated from those numbers. This can be done either in English (U[place] or metric measurements. Note that charts and online BMI calculators are available to help you find your BMI quickly and easily without having to do these calculations yourself.  To calculate your BMI in English (U.S[place] measurements:    1. Measure your weight in pounds (lb).  2. Multiply the number of pounds by 703.  ? For example, for a person who weighs 180 lb, multiply that number by 703, which equals 126,540.  3. Measure your height in inches. Then multiply that number by itself to get a measurement called \"inches squared.\"  ? For example, for a person who is 70 inches tall, the \"inches squared\" measurement is 70 inches x 70 inches, which equals 4,900 inches squared.  4. Divide the total from step 2 (number of lb x 703) by the total from step 3 (inches squared): 126,540 ÷ 4,900 = 25.8. This is " "your BMI.  To calculate your BMI in metric measurements:  1. Measure your weight in kilograms (kg).  2. Measure your height in meters (m). Then multiply that number by itself to get a measurement called \"meters squared.\"  ? For example, for a person who is 1.75 m tall, the \"meters squared\" measurement is 1.75 m x 1.75 m, which is equal to 3.1 meters squared.  3. Divide the number of kilograms (your weight) by the meters squared number. In this example: 70 ÷ 3.1 = 22.6. This is your BMI.  What do the results mean?  BMI charts are used to identify whether you are underweight, normal weight, overweight, or obese. The following guidelines will be used:  · Underweight: BMI less than 18.5.  · Normal weight: BMI between 18.5 and 24.9.  · Overweight: BMI between 25 and 29.9.  · Obese: BMI of 30 or above.  Keep these notes in mind:  · Weight includes both fat and muscle, so someone with a muscular build, such as an athlete, may have a BMI that is higher than 24.9. In cases like these, BMI is not an accurate measure of body fat.  · To determine if excess body fat is the cause of a BMI of 25 or higher, further assessments may need to be done by a health care provider.  · BMI is usually interpreted in the same way for men and women.  Where to find more information  For more information about BMI, including tools to quickly calculate your BMI, go to these websites:  · Centers for Disease Control and Prevention: www.cdc.gov  · American Heart Association: www.heart.org  · National Heart, Lung, and Blood [place]: www.nhlbi.nih.gov  Summary  · Body mass index (BMI) is a number that is calculated from a person's weight and height.  · BMI may help estimate how much of a person's weight is composed of fat. BMI can help identify those who may be at higher risk for certain medical problems.  · BMI can be measured using English measurements or metric measurements.  · BMI charts are used to identify whether you are underweight, normal " weight, overweight, or obese.  This information is not intended to replace advice given to you by your health care provider. Make sure you discuss any questions you have with your health care provider.  Document Revised: 09/09/2020 Document Reviewed: 07/17/2020  Elsevier Patient Education © 2020 [employer]  Dizziness  Dizziness is a common problem. It is a feeling of unsteadiness or light-headedness. You may feel like you are about to faint. Dizziness can lead to injury if you stumble or fall. Anyone can become dizzy, but dizziness is more common in older adults. This condition can be caused by a number of things, including medicines, dehydration, or illness.  Follow these instructions at home:  Eating and drinking  · Drink enough fluid to keep your urine clear or pale yellow. This helps to keep you from becoming dehydrated. Try to drink more clear fluids, such as water.  · Do not drink alcohol.  · Limit your caffeine intake if told to do so by your health care provider. Check ingredients and nutrition facts to see if a food or beverage contains caffeine.  · Limit your salt (sodium) intake if told to do so by your health care provider. Check ingredients and nutrition facts to see if a food or beverage contains sodium.  Activity  · Avoid making quick movements.  ? Rise slowly from chairs and steady yourself until you feel okay.  ? In the morning, first sit up on the side of the bed. When you feel okay, stand slowly while you hold onto something until you know that your balance is fine.  · If you need to  one place for a long time, move your legs often. Tighten and relax the muscles in your legs while you are standing.  · Do not drive or use heavy machinery if you feel dizzy.  · Avoid bending down if you feel dizzy. Place items in your home so that they are easy for you to reach without leaning over.  Lifestyle  · Do not use any products that contain nicotine or tobacco, such as cigarettes and e-cigarettes.  If you need help quitting, ask your health care provider.  · Try to reduce your stress level by using methods such as yoga or meditation. Talk with your health care provider if you need help to manage your stress.  General instructions  · Watch your dizziness for any changes.  · Take over-the-counter and prescription medicines only as told by your health care provider. Talk with your health care provider if you think that your dizziness is caused by a medicine that you are taking.  · Tell a friend or a family member that you are feeling dizzy. If he or she notices any changes in your behavior, have this person call your health care provider.  · Keep all follow-up visits as told by your health care provider. This is important.  Contact a health care provider if:  · Your dizziness does not go away.  · Your dizziness or light-headedness gets worse.  · You feel nauseous.  · You have reduced hearing.  · You have new symptoms.  · You are unsteady on your feet or you feel like the room is spinning.  Get help right away if:  · You vomit or have diarrhea and are unable to eat or drink anything.  · You have problems talking, walking, swallowing, or using your arms, hands, or legs.  · You feel generally weak.  · You are not thinking clearly or you have trouble forming sentences. It may take a friend or family member to notice this.  · You have chest pain, abdominal pain, shortness of breath, or sweating.  · Your vision changes.  · You have any bleeding.  · You have a severe headache.  · You have neck pain or a stiff neck.  · You have a fever.  These symptoms may represent a serious problem that is an emergency. Do not wait to see if the symptoms will go away. Get medical help right away. Call your local emergency services (911 in the [place] Do not drive yourself to the hospital.  Summary  · Dizziness is a feeling of unsteadiness or light-headedness. This condition can be caused by a number of things, including medicines,  dehydration, or illness.  · Anyone can become dizzy, but dizziness is more common in older adults.  · Drink enough fluid to keep your urine clear or pale yellow. Do not drink alcohol.  · Avoid making quick movements if you feel dizzy. Monitor your dizziness for any changes.  This information is not intended to replace advice given to you by your health care provider. Make sure you discuss any questions you have with your health care provider.  Document Revised: 12/21/2018 Document Reviewed: 01/20/2018  Elsevier Patient Education © 2020 [employer] Inc[employer]

## 2021-01-04 NOTE — PROGRESS NOTES
Akash Ch is a 65 y.o. female     Chief Complaint   Patient presents with   • Follow-up   • Hypertension       HPI    Problem list:    1. SVT - flecainide therapy  1.1 Event Monitor 5/7-5/20-16 - NSR with PACs and PVCs  1.2  event monitor 11/19-12/2/18-PACs and PVCs  2. Bradycardia  3. Left atrial tachycardia and right atrial isthmus-dependent flutter   3.1 RFA 10/14/2014  4. Grave’s disease, status post ablation with hypothyroidism.   5. Dyslipidemia.   6. Hypertension  6.1 Stress test 7/23/14 - low risk, no ischemia   6.2 Stress Test 7/12/17-no ischemia, low risk  6.3 Echo 7/21/14 - EF 55-60%; DD II; trace MR, TR  6.4 Echo 7/12/17-EF greater than 65%, diastolic dysfunction 1, trace TR, trace SD, PA 20-25  6.5 Echo 10/2/18 - EF 64%; borderline left atrial enlargement   6.6 stress test 8/5/19-no evidence of ischemia, post-rest EF 64%, occasional PAC  7. Cardiomegaly  7.1 CT Chest 8/5/15 - Cardiomegaly; suspected hepatosplenomegaly with fatty liver  8. Carotid Artery Disease   8.1 Carotid Artery US 10/2/18 - mild atherosclerotic was noted involving the carotid systems; antegrade flow both vertebral arteries.  9.  Negative for orthostatic hypotension 1/4/2021    Patient is a 65-year-old female who presents today for a follow-up.  She denies any chest pain, pressure, syncope, orthopnea, PND or edema.  She says her flutters are much better since we increased the flecainide.  Patient says she has these episodes where she feels like she is going to pass out but she does not.  She says they are very random and sporadic.  She says is like her equilibrium is off.  She says she does have to sit down and put her head between her legs and then it resolves.  Patient says she talked to Dr. Cage about them and he advised he thought they most likely could be her having episodes of her palpitations and just not feeling actual palpitation itself.  That was my thoughts but since she does have a history of some  mild carotid artery disease and its been over 2 years we will repeat a carotid artery ultrasound as well.    Current Outpatient Medications on File Prior to Visit   Medication Sig Dispense Refill   • aspirin 81 MG tablet Take 1 tablet by mouth daily.     • atenolol (TENORMIN) 25 MG tablet TAKE 1/2 TABLET BY MOUTH ONCE DAILY 45 tablet 3   • cetirizine (zyrTEC) 10 MG tablet Take 1 tablet by mouth Daily. 90 tablet 3   • levothyroxine (SYNTHROID, LEVOTHROID) 137 MCG tablet Take 1 tablet by mouth Daily. 90 tablet 1   • lisinopril (PRINIVIL,ZESTRIL) 2.5 MG tablet Take 1 tablet by mouth Daily. 30 tablet 6   • nitroglycerin (NITROSTAT) 0.4 MG SL tablet 1 under the tongue as needed for angina, may repeat q5mins for up three doses 30 tablet 3   • pravastatin (PRAVACHOL) 40 MG tablet TAKE 1 TABLET BY MOUTH EVERY NIGHT 30 tablet 5   • [DISCONTINUED] flecainide (TAMBOCOR) 100 MG tablet Take 1 tablet by mouth 2 (Two) Times a Day. 60 tablet 11     No current facility-administered medications on file prior to visit.        ALLERGIES    Patient has no known allergies.    Past Medical History:   Diagnosis Date   • Allergic    • Arrhythmia    • Closed fracture of left fibula    • Enlarged heart    • Graves' disease    • Hx of mammogram 2016   • Hx of staphylococcal infection    • Hyperlipidemia    • Hypertension 2016       Social History     Socioeconomic History   • Marital status:      Spouse name: Not on file   • Number of children: Not on file   • Years of education: Not on file   • Highest education level: Not on file   Tobacco Use   • Smoking status: Former Smoker     Packs/day: 0.50     Years: 25.00     Pack years: 12.50     Types: Cigarettes     Quit date: 2001     Years since quittin.6   • Smokeless tobacco: Never Used   Substance and Sexual Activity   • Alcohol use: No   • Drug use: No   • Sexual activity: Defer       Family History   Problem Relation Age of Onset   • Hypertension Mother      • Breast cancer Mother    • Bone cancer Mother    • Cancer Mother    • Heart attack Father    • Heart disease Father         pacemaker   • Hyperlipidemia Father    • Hypertension Father    • Diabetes Father         Passed away   • Breast cancer Sister    • Ovarian cancer Sister    • Cancer Sister         sister passed away   • Diabetes Paternal Grandmother        Review of Systems   Constitutional: Negative for chills, fatigue and fever.   HENT: Negative for congestion, rhinorrhea and sore throat.    Eyes: Positive for visual disturbance (glasses).   Respiratory: Negative for cough, chest tightness, shortness of breath and wheezing.    Cardiovascular: Positive for palpitations (flutters and they its better due to increase of meds ). Negative for chest pain and leg swelling.   Gastrointestinal: Negative for abdominal pain, blood in stool, constipation, diarrhea, nausea and vomiting.   Endocrine: Negative for cold intolerance and heat intolerance.   Genitourinary: Negative for difficulty urinating, dysuria, frequency, hematuria and urgency.   Musculoskeletal: Positive for back pain (pt states L5 lays on the nerve and if she sits for long periods of times it takes her a few mins to get up). Negative for arthralgias, joint swelling, neck pain and neck stiffness.   Skin: Negative for rash and wound.   Allergic/Immunologic: Negative for environmental allergies and food allergies.   Neurological: Positive for dizziness (p(t has some and at times she felt like she would pass out , but she denies of passing out and if she has that feeling she sits down and puts her head between her legs and it comes on her quickly; like equalibrium is off; out of the blue, sporadic ). Negative for weakness, light-headedness, numbness and headaches.   Hematological: Bruises/bleeds easily (pt states she has always brisued easy and she does bleed easily).   Psychiatric/Behavioral: Positive for sleep disturbance (pt denies being able to sleep  "all night but pt also staes she has alot of things going on ans she it taking care of her mother in law ).       Objective   /76 (BP Location: Right arm, Patient Position: Standing)   Pulse 68   Ht 167.6 cm (66\")   Wt 82.1 kg (181 lb)   SpO2 95%   BMI 29.21 kg/m²   Vitals:    01/04/21 0903 01/04/21 0940 01/04/21 0941 01/04/21 0942   BP: 133/77 130/85 125/72 124/76   BP Location:  Right arm Right arm Right arm   Patient Position:  Lying Sitting Standing   Pulse: 75 67 67 68   SpO2: 95%      Weight: 82.1 kg (181 lb)      Height: 167.6 cm (66\")         Lab Results (most recent)     None        Physical Exam  Vitals signs reviewed.   Constitutional:       General: She is awake.      Appearance: Normal appearance. She is well-developed, well-groomed and overweight.   HENT:      Head: Normocephalic.   Eyes:      General: Lids are normal.      Comments: Wears glasses    Neck:      Vascular: No carotid bruit, hepatojugular reflux or JVD.   Cardiovascular:      Rate and Rhythm: Normal rate and regular rhythm.      Pulses:           Radial pulses are 2+ on the right side and 2+ on the left side.        Dorsalis pedis pulses are 2+ on the right side and 2+ on the left side.        Posterior tibial pulses are 2+ on the right side and 2+ on the left side.      Heart sounds: Normal heart sounds.   Pulmonary:      Effort: Pulmonary effort is normal.      Breath sounds: Normal breath sounds and air entry.   Abdominal:      General: Bowel sounds are normal.      Palpations: Abdomen is soft.   Musculoskeletal:      Right lower leg: No edema.      Left lower leg: No edema.   Skin:     General: Skin is warm and dry.   Neurological:      Mental Status: She is alert and oriented to person, place, and time.   Psychiatric:         Attention and Perception: Attention and perception normal.         Mood and Affect: Mood and affect normal.         Speech: Speech normal.         Behavior: Behavior normal. Behavior is cooperative.    "      Thought Content: Thought content normal.         Cognition and Memory: Cognition and memory normal.         Judgment: Judgment normal.         Procedure   Procedures         Assessment/Plan      Diagnosis Plan   1. Supraventricular tachycardia (CMS/HCC)     2. Palpitations     3. Dyslipidemia     4. Essential hypertension     5. PAC (premature atrial contraction)     6. Dizziness  Duplex Carotid Ultrasound CAR   7. PVC (premature ventricular contraction)  flecainide (TAMBOCOR) 100 MG tablet   8. Bilateral carotid artery stenosis         Return in about 12 weeks (around 3/29/2021).    SVT/palpitations/PACs/PVCs-patient is on atenolol and flecainide.  Dyslipidemia-patient is on pravastatin and doing well.  Hypertension-patient is on lisinopril and atenolol and doing well.  Dizziness/carotid artery disease-patient have carotid artery ultrasound.  Patient is on aspirin and statin.  She will continue her medication regimen.  She will follow-up in 12 weeks or sooner if any changes or abnormalities with testing.       Justina Ch                Patient's Body mass index is 29.21 kg/m². BMI is above normal parameters. Recommendations include: educational material.      Electronically signed by:

## 2021-01-06 ENCOUNTER — APPOINTMENT (OUTPATIENT)
Dept: CARDIOLOGY | Facility: HOSPITAL | Age: 66
End: 2021-01-06

## 2021-01-12 ENCOUNTER — HOSPITAL ENCOUNTER (OUTPATIENT)
Dept: CARDIOLOGY | Facility: HOSPITAL | Age: 66
Discharge: HOME | End: 2021-01-12
Admitting: NURSE PRACTITIONER

## 2021-01-12 DIAGNOSIS — R42 DIZZINESS: ICD-10-CM

## 2021-01-12 PROCEDURE — 93880 EXTRACRANIAL BILAT STUDY: CPT

## 2021-01-12 PROCEDURE — 93880 EXTRACRANIAL BILAT STUDY: CPT | Performed by: INTERNAL MEDICINE

## 2021-01-18 ENCOUNTER — TELEPHONE (OUTPATIENT)
Dept: CARDIOLOGY | Facility: CLINIC | Age: 66
End: 2021-01-18

## 2021-01-18 LAB
BH CV ECHO MEAS - BSA(HAYCOCK): 2 M^2
BH CV ECHO MEAS - BSA: 1.9 M^2
BH CV ECHO MEAS - BZI_BMI: 29.2 KILOGRAMS/M^2
BH CV ECHO MEAS - BZI_METRIC_HEIGHT: 167.6 CM
BH CV ECHO MEAS - BZI_METRIC_WEIGHT: 82.1 KG
BH CV XLRA MEAS LEFT BULB EDV: -16.2 CM/SEC
BH CV XLRA MEAS LEFT BULB PSV: -61.9 CM/SEC
BH CV XLRA MEAS LEFT CCA RATIO VEL: -78.6 CM/SEC
BH CV XLRA MEAS LEFT DIST CCA EDV: -25.9 CM/SEC
BH CV XLRA MEAS LEFT DIST CCA PSV: -79.4 CM/SEC
BH CV XLRA MEAS LEFT DIST ICA EDV: -43.2 CM/SEC
BH CV XLRA MEAS LEFT DIST ICA PSV: -113.9 CM/SEC
BH CV XLRA MEAS LEFT ICA RATIO VEL: -116 CM/SEC
BH CV XLRA MEAS LEFT ICA/CCA RATIO: 1.5
BH CV XLRA MEAS LEFT MID ICA EDV: -33 CM/SEC
BH CV XLRA MEAS LEFT MID ICA PSV: -116.3 CM/SEC
BH CV XLRA MEAS LEFT PROX CCA EDV: 24.4 CM/SEC
BH CV XLRA MEAS LEFT PROX CCA PSV: 98.2 CM/SEC
BH CV XLRA MEAS LEFT PROX ECA EDV: -11.8 CM/SEC
BH CV XLRA MEAS LEFT PROX ECA PSV: -73.2 CM/SEC
BH CV XLRA MEAS LEFT PROX ICA EDV: -18.2 CM/SEC
BH CV XLRA MEAS LEFT PROX ICA PSV: -64.3 CM/SEC
BH CV XLRA MEAS LEFT VERTEBRAL A EDV: 14.7 CM/SEC
BH CV XLRA MEAS LEFT VERTEBRAL A PSV: 47.1 CM/SEC
BH CV XLRA MEAS RIGHT BULB EDV: -19.3 CM/SEC
BH CV XLRA MEAS RIGHT BULB PSV: -59.7 CM/SEC
BH CV XLRA MEAS RIGHT CCA RATIO VEL: 62.5 CM/SEC
BH CV XLRA MEAS RIGHT DIST CCA EDV: 18.5 CM/SEC
BH CV XLRA MEAS RIGHT DIST CCA PSV: 62.9 CM/SEC
BH CV XLRA MEAS RIGHT DIST ICA EDV: -46.4 CM/SEC
BH CV XLRA MEAS RIGHT DIST ICA PSV: -125.7 CM/SEC
BH CV XLRA MEAS RIGHT ICA RATIO VEL: -125 CM/SEC
BH CV XLRA MEAS RIGHT ICA/CCA RATIO: -2
BH CV XLRA MEAS RIGHT MID ICA EDV: -23.6 CM/SEC
BH CV XLRA MEAS RIGHT MID ICA PSV: -79.1 CM/SEC
BH CV XLRA MEAS RIGHT PROX CCA EDV: 19.9 CM/SEC
BH CV XLRA MEAS RIGHT PROX CCA PSV: 87.7 CM/SEC
BH CV XLRA MEAS RIGHT PROX ECA EDV: -28.6 CM/SEC
BH CV XLRA MEAS RIGHT PROX ECA PSV: -135.5 CM/SEC
BH CV XLRA MEAS RIGHT PROX ICA EDV: -23.1 CM/SEC
BH CV XLRA MEAS RIGHT PROX ICA PSV: -79.6 CM/SEC
BH CV XLRA MEAS RIGHT VERTEBRAL A EDV: 18.9 CM/SEC
BH CV XLRA MEAS RIGHT VERTEBRAL A PSV: 80.9 CM/SEC

## 2021-01-18 NOTE — TELEPHONE ENCOUNTER
Pt advised of her Duplex Carotid U/S and Pt advised to keep taking her ASA and Statins .    Ade Avitia / ABHINAV        ----- Message from JOSE Michael sent at 1/18/2021  2:00 PM EST -----  Please advise patient. On ASA and statin.

## 2021-02-22 ENCOUNTER — TELEPHONE (OUTPATIENT)
Dept: CARDIOLOGY | Facility: CLINIC | Age: 66
End: 2021-02-22

## 2021-02-22 ENCOUNTER — TELEPHONE (OUTPATIENT)
Dept: FAMILY MEDICINE CLINIC | Facility: CLINIC | Age: 66
End: 2021-02-22

## 2021-02-22 NOTE — TELEPHONE ENCOUNTER
"PT CALLED STATING SHE TESTED POSITIVE FOR COVID TODAY    PT IS EXPERIENCING CONGESTION AND A \"CHEST COLD\"     PT IS REQUESTING SOMETHING BE CALLED IN FOR HER    PLEASE ADVISE AT: [phone]     [facility] #[id number] - [place], [place] - [address] - [phone]  - [phone] FX     "

## 2021-02-22 NOTE — TELEPHONE ENCOUNTER
Pt called to let us know she has been diagnosed with COVID-19. I told her as long as the health department clears her to keep her apt for April to discuss everything afterwards.

## 2021-02-24 ENCOUNTER — TELEPHONE (OUTPATIENT)
Dept: FAMILY MEDICINE CLINIC | Facility: CLINIC | Age: 66
End: 2021-02-24

## 2021-02-24 ENCOUNTER — OFFICE VISIT (OUTPATIENT)
Dept: FAMILY MEDICINE CLINIC | Facility: CLINIC | Age: 66
End: 2021-02-24

## 2021-02-24 DIAGNOSIS — J20.8 ACUTE BRONCHITIS DUE TO COVID-19 VIRUS: Primary | ICD-10-CM

## 2021-02-24 DIAGNOSIS — U07.1 ACUTE BRONCHITIS DUE TO COVID-19 VIRUS: Primary | ICD-10-CM

## 2021-02-24 PROCEDURE — 99442 PR PHYS/QHP TELEPHONE EVALUATION 11-20 MIN: CPT | Performed by: NURSE PRACTITIONER

## 2021-02-24 RX ORDER — BENZONATATE 100 MG/1
100 CAPSULE ORAL 3 TIMES DAILY PRN
Qty: 20 CAPSULE | Refills: 0 | Status: SHIPPED | OUTPATIENT
Start: 2021-02-24 | End: 2021-04-14

## 2021-02-24 RX ORDER — DOXYCYCLINE HYCLATE 100 MG/1
100 CAPSULE ORAL 2 TIMES DAILY
Qty: 14 CAPSULE | Refills: 0 | Status: SHIPPED | OUTPATIENT
Start: 2021-02-24 | End: 2021-03-03

## 2021-02-24 RX ORDER — AZITHROMYCIN 250 MG/1
TABLET, FILM COATED ORAL
Qty: 6 TABLET | Refills: 0 | Status: SHIPPED | OUTPATIENT
Start: 2021-02-24 | End: 2021-02-24

## 2021-02-24 NOTE — TELEPHONE ENCOUNTER
PT CALLED STATING SHE HAS TESTED POSITIVE FOR COVID    PT IS REQUESTING SOMETHING BE CALLED IN FOR CHEST CONGESTION AND COUGH    PT IS ALSO REQUESTING A CALL BACK     PLEASE ADVISE AT: [phone]     [id number] - [place], [place] - [address] - [phone]  - [phone] FX

## 2021-02-24 NOTE — PROGRESS NOTES
Subjective     Chief Complaint:    Chief Complaint   Patient presents with   • Covid-19 Home Monitoring Phone Call     You have chosen to receive care through a telephone visit. Do you consent to use a telephone visit for your medical care today? Yes    History of Present Illness:   She started getting sick last Thursday. She notes cough, runny nose, back pain, headache. It is settling in her chest.   She tested positive for covid on Monday 2/22.   She started ivermectin, mucinex, tylenol, vitamin d as recommended by her other doctor  Cough will not away and is strong. No wheezing. No soa. Is able to be up and active.     Review of Systems  Gen- No fevers, chills  CV- No chest pain, palpitations  Resp- No cough, dyspnea  GI- No N/V/D, abd pain  Neuro-No dizziness, headaches      I have reviewed and/or updated the patient's past medical, surgical, family, social history and problem list as appropriate.     Medications:    Current Outpatient Medications:   •  aspirin 81 MG tablet, Take 1 tablet by mouth daily., Disp: , Rfl:   •  atenolol (TENORMIN) 25 MG tablet, TAKE 1/2 TABLET BY MOUTH ONCE DAILY, Disp: 45 tablet, Rfl: 3  •  cetirizine (zyrTEC) 10 MG tablet, Take 1 tablet by mouth Daily., Disp: 90 tablet, Rfl: 3  •  flecainide (TAMBOCOR) 100 MG tablet, Take 1 tablet by mouth 2 (Two) Times a Day., Disp: 180 tablet, Rfl: 3  •  levothyroxine (SYNTHROID, LEVOTHROID) 137 MCG tablet, Take 1 tablet by mouth Daily., Disp: 90 tablet, Rfl: 1  •  lisinopril (PRINIVIL,ZESTRIL) 2.5 MG tablet, Take 1 tablet by mouth Daily., Disp: 30 tablet, Rfl: 6  •  nitroglycerin (NITROSTAT) 0.4 MG SL tablet, 1 under the tongue as needed for angina, may repeat q5mins for up three doses, Disp: 30 tablet, Rfl: 3  •  pravastatin (PRAVACHOL) 40 MG tablet, TAKE 1 TABLET BY MOUTH EVERY NIGHT, Disp: 30 tablet, Rfl: 5  •  azithromycin (Zithromax) 250 MG tablet, TAD, Disp: 6 tablet, Rfl: 0  •  benzonatate (Tessalon Perles) 100 MG capsule, Take 1  capsule by mouth 3 (Three) Times a Day As Needed for Cough., Disp: 20 capsule, Rfl: 0    Allergies:  No Known Allergies    Objective     Vital Signs: There were no vitals filed for this visit.    Physical Exam:  Gen-no acute distress, telephone exam  Resp- normal WOB, able to speak in full sentences without distress  Neuro-A&Ox3, speech clear  Psych-appropriate mood, cooperative         Assessment / Plan     Assessment/Plan:   Problem List Items Addressed This Visit     None      Visit Diagnoses     Acute bronchitis due to COVID-19 virus    -  Primary    Relevant Medications    azithromycin (Zithromax) 250 MG tablet    benzonatate (Tessalon Perles) 100 MG capsule        -- start azith, continue other treatment  -- discussed supportive care  -- discussed s/s that warrant emergent eval    Follow up:  As needed    Total Time of Encounter 20 minutes    Electronically signed by JOSE Baez   02/24/2021 10:37 EST      Please note that portions of this note may have been completed with a voice recognition program. Efforts were made to edit the dictations, but occasionally words are mistranscribed.

## 2021-02-24 NOTE — TELEPHONE ENCOUNTER
Pharmacy Name: [facility] #[id number] - [place], [place] - [address] - [phone]  - [phone]      Pharmacy representative name: OMAR    Pharmacy representative phone number: [phone]    What medication are you calling in regards to: azithromycin (Zithromax) 250 MG tablet    What question does the pharmacy have: DRUG INTERACTION     Who is the provider that prescribed the medication: YUMIKO KEITH / DIPAK JONES    Additional notes:

## 2021-02-24 NOTE — TELEPHONE ENCOUNTER
I called and spoke with the pharmacy.    They have already sold the patient the medication.    They said that there could be an interaction between the azithromycin and flecanide.    Could cause QT prolongation.

## 2021-03-08 ENCOUNTER — TELEPHONE (OUTPATIENT)
Dept: FAMILY MEDICINE CLINIC | Facility: CLINIC | Age: 66
End: 2021-03-08

## 2021-03-08 RX ORDER — DEXTROMETHORPHAN HYDROBROMIDE AND PROMETHAZINE HYDROCHLORIDE 15; 6.25 MG/5ML; MG/5ML
5 SYRUP ORAL 4 TIMES DAILY PRN
Qty: 120 ML | Refills: 0 | Status: SHIPPED | OUTPATIENT
Start: 2021-03-08 | End: 2021-04-14

## 2021-03-08 NOTE — TELEPHONE ENCOUNTER
PATIENT HAS SEEN BY DOCTOR NURY, SEE DOCTOR'S NOTES Probably needs to be seen. Post viral cough can last for weeks. Loss of smell and taste has been reported to last a while as well. No predictors for when that will return. Its different in everyone

## 2021-03-08 NOTE — TELEPHONE ENCOUNTER
Caller: Justina Ch    Relationship to patient: Self    Best call back number: [phone] (H)    Concerns or Questions if Applicable: patient called and stated she tested positive with covid on feb 21. Patient stated she still has a cough, no tastes or smell, cough and stomach pains. Patient wants to know what she needs to do. She is still not feeling well

## 2021-03-08 NOTE — TELEPHONE ENCOUNTER
Called pt and she voiced understanding,  She was wondering if some cough syrup would be beneficial?

## 2021-03-19 RX ORDER — ATENOLOL 25 MG/1
TABLET ORAL
Qty: 45 TABLET | Refills: 3 | Status: SHIPPED | OUTPATIENT
Start: 2021-03-19 | End: 2022-02-03

## 2021-03-23 ENCOUNTER — TELEPHONE (OUTPATIENT)
Dept: FAMILY MEDICINE CLINIC | Facility: CLINIC | Age: 66
End: 2021-03-23

## 2021-03-23 DIAGNOSIS — E03.9 ACQUIRED HYPOTHYROIDISM: ICD-10-CM

## 2021-03-23 DIAGNOSIS — E03.9 ACQUIRED HYPOTHYROIDISM: Primary | ICD-10-CM

## 2021-03-23 DIAGNOSIS — E78.5 DYSLIPIDEMIA: ICD-10-CM

## 2021-03-23 RX ORDER — LEVOTHYROXINE SODIUM 137 UG/1
137 TABLET ORAL DAILY
Qty: 90 TABLET | Refills: 1 | Status: CANCELLED | OUTPATIENT
Start: 2021-03-23

## 2021-03-23 RX ORDER — LEVOTHYROXINE SODIUM 137 UG/1
137 TABLET ORAL DAILY
Qty: 30 TABLET | Refills: 0 | Status: SHIPPED | OUTPATIENT
Start: 2021-03-23 | End: 2021-03-29 | Stop reason: SDUPTHER

## 2021-03-23 NOTE — TELEPHONE ENCOUNTER
PATIENT IS CALLING AFTER BEING TOLD BY HER PHARMACY THAT HER PRESCRIPTION FOR LEVOTHYROXINE WAS DENIED. SHE WOULD LIKE TO TO KNOW WHY AND STATES THAT IF THERE IS AN ISSUE WITH FILLING HER MEDICATION THEN SHE NEEDS TO BE NOTIFIED.     PATIENT: [phone]

## 2021-03-23 NOTE — TELEPHONE ENCOUNTER
I did not deny her med to my knowledge. 1 month sent to pharmacy. She has not had labs since Feb 2020. Please have her come in for lab draw.  did you deny her refill?

## 2021-03-26 ENCOUNTER — LAB (OUTPATIENT)
Dept: FAMILY MEDICINE CLINIC | Facility: CLINIC | Age: 66
End: 2021-03-26

## 2021-03-29 DIAGNOSIS — E03.9 ACQUIRED HYPOTHYROIDISM: ICD-10-CM

## 2021-03-29 RX ORDER — LEVOTHYROXINE SODIUM 137 UG/1
137 TABLET ORAL DAILY
Qty: 90 TABLET | Refills: 3 | Status: SHIPPED | OUTPATIENT
Start: 2021-03-29 | End: 2021-05-11 | Stop reason: SDUPTHER

## 2021-04-14 ENCOUNTER — OFFICE VISIT (OUTPATIENT)
Dept: CARDIOLOGY | Facility: CLINIC | Age: 66
End: 2021-04-14

## 2021-04-14 VITALS
WEIGHT: 178 LBS | BODY MASS INDEX: 28.61 KG/M2 | OXYGEN SATURATION: 96 % | HEIGHT: 66 IN | DIASTOLIC BLOOD PRESSURE: 80 MMHG | TEMPERATURE: 97.3 F | HEART RATE: 76 BPM | SYSTOLIC BLOOD PRESSURE: 122 MMHG

## 2021-04-14 DIAGNOSIS — I47.1 SUPRAVENTRICULAR TACHYCARDIA (HCC): ICD-10-CM

## 2021-04-14 DIAGNOSIS — R06.02 SHORTNESS OF BREATH: ICD-10-CM

## 2021-04-14 DIAGNOSIS — I65.23 BILATERAL CAROTID ARTERY STENOSIS: Primary | ICD-10-CM

## 2021-04-14 DIAGNOSIS — E78.5 DYSLIPIDEMIA: ICD-10-CM

## 2021-04-14 DIAGNOSIS — I10 ESSENTIAL HYPERTENSION: ICD-10-CM

## 2021-04-14 DIAGNOSIS — R00.2 PALPITATIONS: ICD-10-CM

## 2021-04-14 DIAGNOSIS — I49.1 PAC (PREMATURE ATRIAL CONTRACTION): ICD-10-CM

## 2021-04-14 DIAGNOSIS — Z86.16 HISTORY OF COVID-19: ICD-10-CM

## 2021-04-14 DIAGNOSIS — I49.3 PVC (PREMATURE VENTRICULAR CONTRACTION): ICD-10-CM

## 2021-04-14 PROCEDURE — 99214 OFFICE O/P EST MOD 30 MIN: CPT | Performed by: NURSE PRACTITIONER

## 2021-04-14 PROCEDURE — 93000 ELECTROCARDIOGRAM COMPLETE: CPT | Performed by: NURSE PRACTITIONER

## 2021-04-14 RX ORDER — ATORVASTATIN CALCIUM 40 MG/1
40 TABLET, FILM COATED ORAL DAILY
Qty: 90 TABLET | Refills: 3 | Status: SHIPPED | OUTPATIENT
Start: 2021-04-14 | End: 2022-03-02

## 2021-04-14 NOTE — PATIENT INSTRUCTIONS
"BMI for Adults  What is BMI?  Body mass index (BMI) is a number that is calculated from a person's weight and height. BMI can help estimate how much of a person's weight is composed of fat. BMI does not measure body fat directly. Rather, it is an alternative to procedures that directly measure body fat, which can be difficult and expensive.  BMI can help identify people who may be at higher risk for certain medical problems.  What are BMI measurements used for?  BMI is used as a screening tool to identify possible weight problems. It helps determine whether a person is obese, overweight, a healthy weight, or underweight.  BMI is useful for:  · Identifying a weight problem that may be related to a medical condition or may increase the risk for medical problems.  · Promoting changes, such as changes in diet and exercise, to help reach a healthy weight. BMI screening can be repeated to see if these changes are working.  How is BMI calculated?  BMI involves measuring your weight in relation to your height. Both height and weight are measured, and the BMI is calculated from those numbers. This can be done either in English ([place] or metric measurements. Note that charts and online BMI calculators are available to help you find your BMI quickly and easily without having to do these calculations yourself.  To calculate your BMI in English ([place] measurements:    1. Measure your weight in pounds (lb).  2. Multiply the number of pounds by 703.  ? For example, for a person who weighs 180 lb, multiply that number by 703, which equals 126,540.  3. Measure your height in inches. Then multiply that number by itself to get a measurement called \"inches squared.\"  ? For example, for a person who is 70 inches tall, the \"inches squared\" measurement is 70 inches x 70 inches, which equals 4,900 inches squared.  4. Divide the total from step 2 (number of lb x 703) by the total from step 3 (inches squared): 126,540 ÷ 4,900 = 25.8. This is " "your BMI.  To calculate your BMI in metric measurements:  1. Measure your weight in kilograms (kg).  2. Measure your height in meters (m). Then multiply that number by itself to get a measurement called \"meters squared.\"  ? For example, for a person who is 1.75 m tall, the \"meters squared\" measurement is 1.75 m x 1.75 m, which is equal to 3.1 meters squared.  3. Divide the number of kilograms (your weight) by the meters squared number. In this example: 70 ÷ 3.1 = 22.6. This is your BMI.  What do the results mean?  BMI charts are used to identify whether you are underweight, normal weight, overweight, or obese. The following guidelines will be used:  · Underweight: BMI less than 18.5.  · Normal weight: BMI between 18.5 and 24.9.  · Overweight: BMI between 25 and 29.9.  · Obese: BMI of 30 or above.  Keep these notes in mind:  · Weight includes both fat and muscle, so someone with a muscular build, such as an athlete, may have a BMI that is higher than 24.9. In cases like these, BMI is not an accurate measure of body fat.  · To determine if excess body fat is the cause of a BMI of 25 or higher, further assessments may need to be done by a health care provider.  · BMI is usually interpreted in the same way for men and women.  Where to find more information  For more information about BMI, including tools to quickly calculate your BMI, go to these websites:  · Centers for Disease Control and Prevention: www.cdc.gov  · American Heart Association: www.heart.org  · National Heart, Lung, and Blood [place]: www.nhlbi.nih.gov  Summary  · Body mass index (BMI) is a number that is calculated from a person's weight and height.  · BMI may help estimate how much of a person's weight is composed of fat. BMI can help identify those who may be at higher risk for certain medical problems.  · BMI can be measured using English measurements or metric measurements.  · BMI charts are used to identify whether you are underweight, normal " weight, overweight, or obese.  This information is not intended to replace advice given to you by your health care provider. Make sure you discuss any questions you have with your health care provider.  Document Revised: 09/09/2020 Document Reviewed: 07/17/2020  Elsevier Patient Education © 2021 [employer]

## 2021-04-14 NOTE — PROGRESS NOTES
Akash Ch is a 66 y.o. female     Chief Complaint   Patient presents with   • Follow-up   • supraventricular tachycardia       HPI    Problem list:    1. SVT - flecainide therapy  1.1 Event Monitor 5/7-5/20-16 - NSR with PACs and PVCs  1.2  event monitor 11/19-12/2/18-PACs and PVCs  2. Bradycardia  3. Left atrial tachycardia and right atrial isthmus-dependent flutter   3.1 RFA 10/14/2014  4. Grave’s disease, status post ablation with hypothyroidism.   5. Dyslipidemia.   6. Hypertension  6.1 Stress test 7/23/14 - low risk, no ischemia   6.2 Stress Test 7/12/17-no ischemia, low risk  6.3 Echo 7/21/14 - EF 55-60%; DD II; trace MR, TR  6.4 Echo 7/12/17-EF greater than 65%, diastolic dysfunction 1, trace TR, trace [place], PA 20-25  6.5 Echo 10/2/18 - EF 64%; borderline left atrial enlargement   6.6 stress test 8/5/19-no evidence of ischemia, post-rest EF 64%, occasional PAC  7. Cardiomegaly  7.1 CT Chest 8/5/15 - Cardiomegaly; suspected hepatosplenomegaly with fatty liver  8. Carotid Artery Disease   8.1 Carotid Artery US 10/2/18 - mild atherosclerotic was noted involving the carotid systems; antegrade flow both vertebral arteries.  8.2 carotid artery ultrasound 1/12/2021-16 to 49% stenosis distal right internal carotid artery, 16 to 49% stenosis mid and distal left internal carotid artery, antegrade flow both vertebral arteries      9.  Negative for orthostatic hypotension 1/4/2021      10.  History of COVID-19 2/22/2021    Patient is a 66-year-old female who presents today for follow-up on testing.  She denies any chest pain or pressure.  She says she has had some fluttering which is increasing to having Covid.  She denies any dizziness, presyncope, syncope, orthopnea, PND or edema.  She says she has had increase in shortness of breath and fatigue since having Covid.  She is still having her cough as well.    We went over carotid artery ultrasound.    Current Outpatient Medications on File Prior to Visit    Medication Sig Dispense Refill   • aspirin 81 MG tablet Take 1 tablet by mouth daily.     • atenolol (TENORMIN) 25 MG tablet TAKE 1/2 TABLET BY MOUTH ONCE EVERY DAY 45 tablet 3   • cetirizine (zyrTEC) 10 MG tablet Take 1 tablet by mouth Daily. 90 tablet 3   • flecainide (TAMBOCOR) 100 MG tablet Take 1 tablet by mouth 2 (Two) Times a Day. 180 tablet 3   • levothyroxine (SYNTHROID, LEVOTHROID) 137 MCG tablet Take 1 tablet by mouth Daily. 90 tablet 3   • lisinopril (PRINIVIL,ZESTRIL) 2.5 MG tablet Take 1 tablet by mouth Daily. 30 tablet 6   • nitroglycerin (NITROSTAT) 0.4 MG SL tablet 1 under the tongue as needed for angina, may repeat q5mins for up three doses 30 tablet 3   • [DISCONTINUED] pravastatin (PRAVACHOL) 40 MG tablet TAKE 1 TABLET BY MOUTH EVERY NIGHT 30 tablet 5   • [DISCONTINUED] benzonatate (Tessalon Perles) 100 MG capsule Take 1 capsule by mouth 3 (Three) Times a Day As Needed for Cough. 20 capsule 0   • [DISCONTINUED] promethazine-dextromethorphan (PROMETHAZINE-DM) 6.25-15 MG/5ML syrup Take 5 mL by mouth 4 (Four) Times a Day As Needed for Cough. 120 mL 0     No current facility-administered medications on file prior to visit.       ALLERGIES    Patient has no known allergies.    Past Medical History:   Diagnosis Date   • Allergic    • Arrhythmia    • Closed fracture of left fibula    • COVID-19 2021   • Enlarged heart    • Graves' disease    • Hx of mammogram 2016   • Hx of staphylococcal infection    • Hyperlipidemia    • Hypertension 2016       Social History     Socioeconomic History   • Marital status:      Spouse name: Not on file   • Number of children: Not on file   • Years of education: Not on file   • Highest education level: Not on file   Tobacco Use   • Smoking status: Former Smoker     Packs/day: 0.50     Years: 25.00     Pack years: 12.50     Types: Cigarettes     Quit date: 2001     Years since quittin.9   • Smokeless tobacco: Never Used    Substance and Sexual Activity   • Alcohol use: No   • Drug use: No   • Sexual activity: Defer       Family History   Problem Relation Age of Onset   • Hypertension Mother    • Breast cancer Mother    • Bone cancer Mother    • Cancer Mother    • Heart attack Father    • Heart disease Father         pacemaker   • Hyperlipidemia Father    • Hypertension Father    • Diabetes Father         Passed away   • Breast cancer Sister    • Ovarian cancer Sister    • Cancer Sister         sister passed away   • Diabetes Paternal Grandmother        Review of Systems   Constitutional: Positive for fatigue (tired from covid )02/22/2021)). Negative for appetite change, chills, diaphoresis and fever.   HENT: Negative for congestion, rhinorrhea and sore throat.    Eyes: Positive for visual disturbance (glasses).   Respiratory: Positive for cough (due to covid (02/22/2021)) and shortness of breath (walking and since Covid Dx ( 02/22/2021)). Negative for chest tightness and wheezing.    Cardiovascular: Positive for palpitations (fluttering ). Negative for chest pain and leg swelling.   Gastrointestinal: Negative for abdominal pain, blood in stool, constipation, diarrhea, nausea and vomiting.   Endocrine: Negative for cold intolerance and heat intolerance.   Genitourinary: Positive for frequency (when she drinks anything she has to go alot ). Negative for difficulty urinating, dysuria, hematuria and urgency.   Musculoskeletal: Positive for neck stiffness (both sides due to covid ). Negative for back pain, joint swelling and neck pain.   Skin: Negative for color change, pallor, rash and wound.   Allergic/Immunologic: Negative for environmental allergies and food allergies.   Neurological: Negative for dizziness, weakness, light-headedness, numbness and headaches.   Hematological: Bruises/bleeds easily (both due to asa ).   Psychiatric/Behavioral: Positive for sleep disturbance (hard to stay asleep she gets 3-4 hrs a night she tosses and  "turns all night wakes up and hard to get back to sleep ).       Objective   /80 (BP Location: Left arm)   Pulse 76   Temp 97.3 °F (36.3 °C)   Ht 167.6 cm (66\")   Wt 80.7 kg (178 lb)   SpO2 96%   BMI 28.73 kg/m²   Vitals:    04/14/21 0853   BP: 122/80   BP Location: Left arm   Pulse: 76   Temp: 97.3 °F (36.3 °C)   SpO2: 96%   Weight: 80.7 kg (178 lb)   Height: 167.6 cm (66\")      Lab Results (most recent)     None        Physical Exam  Vitals reviewed.   Constitutional:       General: She is awake.      Appearance: Normal appearance. She is well-developed, well-groomed and overweight.   HENT:      Head: Normocephalic.   Eyes:      General: Lids are normal.      Comments: Wears glasses    Neck:      Vascular: No carotid bruit, hepatojugular reflux or JVD.   Cardiovascular:      Rate and Rhythm: Normal rate and regular rhythm.      Pulses:           Radial pulses are 2+ on the right side and 2+ on the left side.        Dorsalis pedis pulses are 2+ on the right side and 2+ on the left side.        Posterior tibial pulses are 2+ on the right side and 2+ on the left side.      Heart sounds: Normal heart sounds.   Pulmonary:      Effort: Pulmonary effort is normal.      Breath sounds: Normal breath sounds and air entry.   Abdominal:      General: Bowel sounds are normal.      Palpations: Abdomen is soft.   Musculoskeletal:      Right lower leg: No edema.      Left lower leg: No edema.   Skin:     General: Skin is warm and dry.   Neurological:      Mental Status: She is alert and oriented to person, place, and time.   Psychiatric:         Attention and Perception: Attention and perception normal.         Mood and Affect: Mood and affect normal.         Speech: Speech normal.         Behavior: Behavior normal. Behavior is cooperative.         Thought Content: Thought content normal.         Cognition and Memory: Cognition and memory normal.         Judgment: Judgment normal.         Procedure     ECG 12 " Lead    Date/Time: 4/14/2021 12:43 PM  Performed by: Mary Guthrie APRN  Authorized by: Mary Guthrie APRN   Comparison: compared with previous ECG from 9/11/2020  Similar to previous ECG  Rhythm: sinus rhythm  Rate: normal  BPM: 76  Conduction: non-specific intraventricular conduction delay  QRS axis: normal  Other findings: low voltage    Clinical impression: non-specific ECG  Comments: QT/QTc 342/372                 Assessment/Plan      Diagnosis Plan   1. Bilateral carotid artery stenosis  atorvastatin (LIPITOR) 40 MG tablet    Lipid Panel    Comprehensive Metabolic Panel   2. Essential hypertension  Adult Transthoracic Echo Complete W/ Cont if Necessary Per Protocol    ECG 12 Lead   3. Dyslipidemia  atorvastatin (LIPITOR) 40 MG tablet    Lipid Panel    Comprehensive Metabolic Panel   4. PAC (premature atrial contraction)  Adult Transthoracic Echo Complete W/ Cont if Necessary Per Protocol    ECG 12 Lead   5. Palpitations  Adult Transthoracic Echo Complete W/ Cont if Necessary Per Protocol    ECG 12 Lead   6. PVC (premature ventricular contraction)  Adult Transthoracic Echo Complete W/ Cont if Necessary Per Protocol    ECG 12 Lead   7. Supraventricular tachycardia (CMS/HCC)  Adult Transthoracic Echo Complete W/ Cont if Necessary Per Protocol    ECG 12 Lead   8. History of COVID-19     9. Shortness of breath  Adult Transthoracic Echo Complete W/ Cont if Necessary Per Protocol       Return in about 6 months (around 10/14/2021).    Bilateral carotid artery disease-patient's on aspirin and statin.  I changed her statin to Lipitor.  Hypertension-patient's doing well on atenolol and lisinopril.  Dyslipidemia-patient will start Lipitor and get lipid and CMP in 6 weeks.  PACs/palpitations/PVCs/SVT-patient is on flecainide and beta-blocker.  History of COVID-19/shortness of breath/palpitations-patient will have an echocardiogram.  She will continue her medication regimen with above-noted change patient follow-up in  6 months or sooner if any changes or abnormalities with echo.       Justina Ch  reports that she quit smoking about 19 years ago. Her smoking use included cigarettes. She has a 12.50 pack-year smoking history. She has never used smokeless tobacco..  Patient's Body mass index is 28.73 kg/m². BMI is above normal parameters. Recommendations include: educational material.      Electronically signed by:

## 2021-05-11 ENCOUNTER — OFFICE VISIT (OUTPATIENT)
Dept: FAMILY MEDICINE CLINIC | Facility: CLINIC | Age: 66
End: 2021-05-11

## 2021-05-11 VITALS
WEIGHT: 175 LBS | SYSTOLIC BLOOD PRESSURE: 108 MMHG | DIASTOLIC BLOOD PRESSURE: 64 MMHG | OXYGEN SATURATION: 96 % | BODY MASS INDEX: 28.12 KG/M2 | HEART RATE: 78 BPM | TEMPERATURE: 97.5 F | HEIGHT: 66 IN | RESPIRATION RATE: 18 BRPM

## 2021-05-11 DIAGNOSIS — J45.30 MILD PERSISTENT REACTIVE AIRWAY DISEASE WITHOUT COMPLICATION: ICD-10-CM

## 2021-05-11 DIAGNOSIS — R05.3 CHRONIC COUGH: Primary | ICD-10-CM

## 2021-05-11 DIAGNOSIS — Z86.16 HISTORY OF COVID-19: ICD-10-CM

## 2021-05-11 DIAGNOSIS — E03.9 ACQUIRED HYPOTHYROIDISM: ICD-10-CM

## 2021-05-11 DIAGNOSIS — L65.9 HAIR LOSS: ICD-10-CM

## 2021-05-11 PROCEDURE — 99214 OFFICE O/P EST MOD 30 MIN: CPT | Performed by: NURSE PRACTITIONER

## 2021-05-11 RX ORDER — ALBUTEROL SULFATE 90 UG/1
2 AEROSOL, METERED RESPIRATORY (INHALATION) EVERY 4 HOURS PRN
Qty: 18 G | Refills: 0 | Status: SHIPPED | OUTPATIENT
Start: 2021-05-11 | End: 2021-12-01

## 2021-05-11 RX ORDER — LEVOTHYROXINE SODIUM 137 UG/1
137 TABLET ORAL DAILY
Qty: 90 TABLET | Refills: 3 | Status: SHIPPED | OUTPATIENT
Start: 2021-05-11 | End: 2022-03-31 | Stop reason: SDUPTHER

## 2021-05-11 RX ORDER — FLUTICASONE FUROATE 100 UG/1
1 POWDER RESPIRATORY (INHALATION) DAILY
Qty: 30 EACH | Refills: 2 | Status: SHIPPED | OUTPATIENT
Start: 2021-05-11 | End: 2021-05-17

## 2021-05-11 NOTE — PROGRESS NOTES
Subjective     Chief Complaint:    Chief Complaint   Patient presents with   • Cough     Pt states she had covid and still has a cough. Pt c/o hairloss.    • Alopecia       History of Present Illness:   She had covid in Feb. Since then has chronic cough. Will cough everyday. Coughs when she breaths in. Does have allergies and takes zyrtec. Is soa at time. Worse when she goes up stairs or long walk. Cough is dry. No wheezing. No aggravating factors. Just happens randomly.   Does have a little heartburn sometimes in the AM. Not taking anything for it. Happening once per week.   She notes her hair is falling out over the past few weeks. Has hypothyroidism and takes levothyroxine. TSH normal in March.   She takes caltrate and flaxseed.       Review of Systems  Gen- No fevers, chills  CV- No chest pain, palpitations  Resp- No cough, dyspnea  GI- No N/V/D, abd pain  Neuro-No dizziness, headaches      I have reviewed and/or updated the patient's past medical, surgical, family, social history and problem list as appropriate.     Medications:    Current Outpatient Medications:   •  aspirin 81 MG tablet, Take 1 tablet by mouth daily., Disp: , Rfl:   •  atenolol (TENORMIN) 25 MG tablet, TAKE 1/2 TABLET BY MOUTH ONCE EVERY DAY, Disp: 45 tablet, Rfl: 3  •  atorvastatin (LIPITOR) 40 MG tablet, Take 1 tablet by mouth Daily., Disp: 90 tablet, Rfl: 3  •  cetirizine (zyrTEC) 10 MG tablet, Take 1 tablet by mouth Daily., Disp: 90 tablet, Rfl: 3  •  flecainide (TAMBOCOR) 100 MG tablet, Take 1 tablet by mouth 2 (Two) Times a Day., Disp: 180 tablet, Rfl: 3  •  levothyroxine (SYNTHROID, LEVOTHROID) 137 MCG tablet, Take 1 tablet by mouth Daily., Disp: 90 tablet, Rfl: 3  •  lisinopril (PRINIVIL,ZESTRIL) 2.5 MG tablet, Take 1 tablet by mouth Daily., Disp: 30 tablet, Rfl: 6  •  nitroglycerin (NITROSTAT) 0.4 MG SL tablet, 1 under the tongue as needed for angina, may repeat q5mins for up three doses, Disp: 30 tablet, Rfl: 3  •  albuterol  "sulfate  (90 Base) MCG/ACT inhaler, Inhale 2 puffs Every 4 (Four) Hours As Needed for Wheezing., Disp: 18 g, Rfl: 0  •  Fluticasone Furoate (Arnuity Ellipta) 100 MCG/ACT aerosol powder , Inhale 1 puff Daily., Disp: 30 each, Rfl: 2    Allergies:  No Known Allergies    Objective     Vital Signs:   Vitals:    05/11/21 1310   BP: 108/64   Pulse: 78   Resp: 18   Temp: 97.5 °F (36.4 °C)   SpO2: 96%   Weight: 79.4 kg (175 lb)   Height: 167.6 cm (66\")   PainSc: 0-No pain       Physical Exam:    Physical Exam  Vitals and nursing note reviewed.   Constitutional:       Appearance: She is well-developed.   HENT:      Head: Normocephalic and atraumatic.      Right Ear: Tympanic membrane, ear canal and external ear normal.      Left Ear: Tympanic membrane, ear canal and external ear normal.      Nose: Nose normal.      Mouth/Throat:      Mouth: Mucous membranes are moist.      Pharynx: No oropharyngeal exudate or posterior oropharyngeal erythema.   Eyes:      Pupils: Pupils are equal, round, and reactive to light.   Cardiovascular:      Rate and Rhythm: Normal rate and regular rhythm.      Heart sounds: Normal heart sounds.   Pulmonary:      Effort: Pulmonary effort is normal.      Breath sounds: Normal breath sounds.      Comments: Mild referred breath sounds in upper lobes  Abdominal:      General: Bowel sounds are normal. There is no distension.      Palpations: Abdomen is soft.      Tenderness: There is no abdominal tenderness.   Musculoskeletal:      Cervical back: Neck supple.   Skin:     General: Skin is warm and dry.      Capillary Refill: Capillary refill takes less than 2 seconds.   Neurological:      General: No focal deficit present.      Mental Status: She is alert and oriented to person, place, and time.   Psychiatric:         Mood and Affect: Mood normal.         Behavior: Behavior normal.         Assessment / Plan     Assessment/Plan:   Problem List Items Addressed This Visit        Other    History of " COVID-19      Other Visit Diagnoses     Chronic cough    -  Primary    Relevant Medications    Fluticasone Furoate (Arnuity Ellipta) 100 MCG/ACT aerosol powder     albuterol sulfate  (90 Base) MCG/ACT inhaler    Other Relevant Orders    XR Chest PA & Lateral    Acquired hypothyroidism        Relevant Medications    levothyroxine (SYNTHROID, LEVOTHROID) 137 MCG tablet    Mild persistent reactive airway disease without complication        Relevant Medications    Fluticasone Furoate (Arnuity Ellipta) 100 MCG/ACT aerosol powder     albuterol sulfate  (90 Base) MCG/ACT inhaler    Hair loss        Relevant Orders    Vitamin B12    Folate        --Chronic cough likely reactive airway post Covid and exacerbated by allergies.  Trial steroid inhaler.  Eli as needed.  Continue Zyrtec.  Add Nasacort for worsening nasal symptoms.  Check chest x-ray.  We will follow up in 4 weeks and see how she is responding.  --Check B vitamins due to hair loss.  TSH normal likely not related to thyroid.  Encouraged use of collagen supplementation.  If persists can consider referral to dermatology    Follow up:  Return in about 4 weeks (around 6/8/2021).    Electronically signed by JOSE Baez   05/11/2021 13:37 EDT      Please note that portions of this note may have been completed with a voice recognition program. Efforts were made to edit the dictations, but occasionally words are mistranscribed.

## 2021-05-12 LAB
FOLATE SERPL-MCNC: 18.1 NG/ML
Lab: NORMAL
VIT B12 SERPL-MCNC: 388 PG/ML (ref 232–1245)

## 2021-05-17 ENCOUNTER — TELEPHONE (OUTPATIENT)
Dept: FAMILY MEDICINE CLINIC | Facility: CLINIC | Age: 66
End: 2021-05-17

## 2021-05-17 RX ORDER — DEXAMETHASONE 4 MG/1
1 TABLET ORAL
Qty: 12 G | Refills: 2 | Status: SHIPPED | OUTPATIENT
Start: 2021-05-17 | End: 2021-11-23

## 2021-05-17 NOTE — TELEPHONE ENCOUNTER
Caller: Justina Ch    Relationship: Self    Best call back number: [phone]    Caller requesting test results: YES    What test was performed CHEST XRAY    When was the test performed: 05/14/21    Where was the test performed: [facility]      Additional notes: PATIENT ALSO REQUESTING A CALL BACK REGARDING PRESCRIPTION FOR STEROID INHALER THAT INSURANCE WILL NOT COVERED

## 2021-05-24 ENCOUNTER — APPOINTMENT (OUTPATIENT)
Dept: CARDIOLOGY | Facility: HOSPITAL | Age: 66
End: 2021-05-24

## 2021-06-17 DIAGNOSIS — I10 ESSENTIAL HYPERTENSION: ICD-10-CM

## 2021-06-17 RX ORDER — LISINOPRIL 2.5 MG/1
2.5 TABLET ORAL DAILY
Qty: 90 TABLET | Refills: 3 | Status: SHIPPED | OUTPATIENT
Start: 2021-06-17 | End: 2021-06-18 | Stop reason: SDUPTHER

## 2021-06-18 DIAGNOSIS — I10 ESSENTIAL HYPERTENSION: ICD-10-CM

## 2021-06-18 RX ORDER — LISINOPRIL 2.5 MG/1
2.5 TABLET ORAL DAILY
Qty: 90 TABLET | Refills: 3 | Status: SHIPPED | OUTPATIENT
Start: 2021-06-18 | End: 2021-11-23

## 2021-07-09 ENCOUNTER — OFFICE VISIT (OUTPATIENT)
Dept: CARDIOLOGY | Facility: CLINIC | Age: 66
End: 2021-07-09

## 2021-07-09 VITALS
HEART RATE: 80 BPM | WEIGHT: 181 LBS | OXYGEN SATURATION: 97 % | BODY MASS INDEX: 29.09 KG/M2 | SYSTOLIC BLOOD PRESSURE: 125 MMHG | DIASTOLIC BLOOD PRESSURE: 76 MMHG | HEIGHT: 66 IN

## 2021-07-09 DIAGNOSIS — I49.1 PAC (PREMATURE ATRIAL CONTRACTION): Primary | ICD-10-CM

## 2021-07-09 DIAGNOSIS — I47.1 SUPRAVENTRICULAR TACHYCARDIA (HCC): ICD-10-CM

## 2021-07-09 DIAGNOSIS — I10 ESSENTIAL HYPERTENSION: ICD-10-CM

## 2021-07-09 PROCEDURE — 99213 OFFICE O/P EST LOW 20 MIN: CPT | Performed by: INTERNAL MEDICINE

## 2021-07-09 PROCEDURE — 93000 ELECTROCARDIOGRAM COMPLETE: CPT | Performed by: INTERNAL MEDICINE

## 2021-07-09 NOTE — PROGRESS NOTES
Justina Jing  1955  [phone]    07/09/2021    [facility] CARDIOLOGY     Referring Provider: No ref. provider found     Sonali Maldonado, APRN  [address] [place] [address]    Chief Complaint   Patient presents with   • premature atrial contraction        Problem List:  1. Supraventricular tachycardia:   a. History of tachypalpitations and emergency room visits dating back to at least 2-3 years ago.   b. Emergency room visit in July with subsequent hospitalization for supraventricular tachycardia at [facility] for initiation of flecainide.   c. Event recorder, July 2014, demonstrating runs of narrow QRS tachycardia, as fast as 190-200 beats per minute.   d. Echocardiogram, 07/21/2014: Normal left ventricular size and function with ejection fraction of 55%. No significant valvular insufficiency.   e. GXT Cardiolite, database normal, per patient, database incomplete.  f. Status post EP study and RFA of left atrial tachycardia and right atrial isthmus-dependent flutter on 10/04/2014.  g. Recurrent tachypalpitations with event monitor, 01/23/2015 thru 02/21/2015, showing sinus rhythm with occasional PAC and sinus arrhythmia and no SVT or atrial fibrillation.   h. Initiation of Tambocor therapy for suppression of PACs, 02/27/2015.   i. Echo 7/12/17 EF 65%, trace TR  j. Nuclear stress test 7/12/17 EF 75%, no ischemia  k. Echocardiogram 10/2/18: EF 60%, no significant valvular disease  l. Event monitor 11/19/18: Predominately NSR, <1% PAC's/PVC's  2. Grave’s disease, status post ablation with hypothyroidism.   3. Dyslipidemia.   4. Hypertension.   5. Skin cancer.   6. Surgical history:  a. D and C.   b. Skin excision       Allergies  No Known Allergies    Current Medications    Current Outpatient Medications:   •  albuterol sulfate  (90 Base) MCG/ACT inhaler, Inhale 2 puffs Every 4 (Four) Hours As Needed for Wheezing., Disp: 18 g, Rfl: 0  •  aspirin  "81 MG tablet, Take 1 tablet by mouth daily., Disp: , Rfl:   •  atenolol (TENORMIN) 25 MG tablet, TAKE 1/2 TABLET BY MOUTH ONCE EVERY DAY, Disp: 45 tablet, Rfl: 3  •  atorvastatin (LIPITOR) 40 MG tablet, Take 1 tablet by mouth Daily., Disp: 90 tablet, Rfl: 3  •  cetirizine (zyrTEC) 10 MG tablet, Take 1 tablet by mouth Daily., Disp: 90 tablet, Rfl: 3  •  flecainide (TAMBOCOR) 100 MG tablet, Take 1 tablet by mouth 2 (Two) Times a Day., Disp: 180 tablet, Rfl: 3  •  fluticasone (Flovent HFA) 110 MCG/ACT inhaler, Inhale 1 puff 2 (Two) Times a Day., Disp: 12 g, Rfl: 2  •  levothyroxine (SYNTHROID, LEVOTHROID) 137 MCG tablet, Take 1 tablet by mouth Daily., Disp: 90 tablet, Rfl: 3  •  lisinopril (PRINIVIL,ZESTRIL) 2.5 MG tablet, Take 1 tablet by mouth Daily., Disp: 90 tablet, Rfl: 3  •  nitroglycerin (NITROSTAT) 0.4 MG SL tablet, 1 under the tongue as needed for angina, may repeat q5mins for up three doses, Disp: 30 tablet, Rfl: 3    History of Present Illness     Pt presents for follow up of SVT/HTN/PAC. Since we last saw the pt, pt denies any AF episodes, SOB, CP, LH, and dizziness. Denies any hospitalizations, ER visits, bleeding, or TIA/CVA symptoms. Overall feels well. + COVID in January. BP stable at home    ROS:  General:  Denies fatigue, weight gain or loss  Cardiovascular:  Denies CP, PND, syncope, near syncope, edema or palpitations.  Pulmonary:  Denies KIRKPATRICK, cough, or wheezing      Vitals:    07/09/21 1052   BP: 125/76   BP Location: Right arm   Patient Position: Sitting   Pulse: 80   SpO2: 97%   Weight: 82.1 kg (181 lb)   Height: 167.6 cm (66\")     Body mass index is 29.21 kg/m².  PE:  General: NAD  Neck: no JVD, no carotid bruits, no TM  Heart RRR, NL S1, S2, S4 present, no rubs, murmurs  Lungs: CTA, no wheezes, rhonchi, or rales  Abd: soft, non-tender, NL BS  Ext: No musculoskeletal deformities, no edema, cyanosis, or clubbing  Psych: normal mood and affect    Diagnostic Data:        ECG 12 Lead    Date/Time: " 7/9/2021 11:01 AM  Performed by: Jose Cage MD  Authorized by: Jose Cage MD   Comparison: compared with previous ECG from 4/14/2021  Similar to previous ECG  Rhythm: sinus rhythm  BPM: 68              1. PAC (premature atrial contraction)    2. Supraventricular tachycardia (CMS/HCC)    3. Essential hypertension          Plan:  1) PAC's: much improved on BBL/flecainide  2) AT/AFL:  - S/p RFA Doing well, No recurrence.    3) Anticoagulation:  - Continue ASA  4) HTN:  - Well controlled on meds  - Wt loss, exercise, salt reduction    F/up in 8 months

## 2021-09-16 DIAGNOSIS — R09.82 PND (POST-NASAL DRIP): ICD-10-CM

## 2021-09-16 DIAGNOSIS — J30.2 SEASONAL ALLERGIC RHINITIS: ICD-10-CM

## 2021-09-16 RX ORDER — CETIRIZINE HYDROCHLORIDE 10 MG/1
TABLET ORAL
Qty: 90 TABLET | Refills: 3 | Status: SHIPPED | OUTPATIENT
Start: 2021-09-16 | End: 2022-12-05

## 2021-11-23 ENCOUNTER — OFFICE VISIT (OUTPATIENT)
Dept: FAMILY MEDICINE CLINIC | Facility: CLINIC | Age: 66
End: 2021-11-23

## 2021-11-23 VITALS
HEIGHT: 66 IN | HEART RATE: 73 BPM | DIASTOLIC BLOOD PRESSURE: 79 MMHG | TEMPERATURE: 98.3 F | WEIGHT: 177.4 LBS | BODY MASS INDEX: 28.51 KG/M2 | OXYGEN SATURATION: 95 % | SYSTOLIC BLOOD PRESSURE: 101 MMHG

## 2021-11-23 DIAGNOSIS — J45.30 MILD PERSISTENT REACTIVE AIRWAY DISEASE WITHOUT COMPLICATION: ICD-10-CM

## 2021-11-23 DIAGNOSIS — R05.3 CHRONIC COUGH: Primary | ICD-10-CM

## 2021-11-23 DIAGNOSIS — I47.1 SUPRAVENTRICULAR TACHYCARDIA (HCC): ICD-10-CM

## 2021-11-23 DIAGNOSIS — I10 ESSENTIAL HYPERTENSION: ICD-10-CM

## 2021-11-23 DIAGNOSIS — J30.2 SEASONAL ALLERGIC RHINITIS, UNSPECIFIED TRIGGER: ICD-10-CM

## 2021-11-23 DIAGNOSIS — E03.9 ACQUIRED HYPOTHYROIDISM: ICD-10-CM

## 2021-11-23 DIAGNOSIS — R12 HEARTBURN: ICD-10-CM

## 2021-11-23 PROCEDURE — 99214 OFFICE O/P EST MOD 30 MIN: CPT | Performed by: NURSE PRACTITIONER

## 2021-11-23 NOTE — PROGRESS NOTES
Subjective     Chief Complaint:    Chief Complaint   Patient presents with   • Cough     Pt states she has had since 02/21 when she had covid.   • Shortness of Breath       History of Present Illness:   Here for f/u on cough. She has had cough since covid in 2/2021. She is using flovent 2 puffs twice. She still has albuterol inhaler but does not need very often. She is still soa and had cough. If she walks up stairs she will feel soa. She will cough some through the day. No cough fits. She denies any nasal drainage. Does take zyrtec. She has been having heartburn. Normally doesn't but has the last several weeks. She is taking tums every few days. No abdominal pain. She does cough at night.   She follows with cards for management of HTN and arrhythmia. Tolerating meds.       Review of Systems  Gen- No fevers, chills  CV- No chest pain, palpitations  GI- No N/V/D, abd pain  Neuro-No dizziness, headaches      I have reviewed and/or updated the patient's past medical, surgical, family, social history and problem list as appropriate.     Medications:    Current Outpatient Medications:   •  albuterol sulfate  (90 Base) MCG/ACT inhaler, Inhale 2 puffs Every 4 (Four) Hours As Needed for Wheezing., Disp: 18 g, Rfl: 0  •  aspirin 81 MG tablet, Take 1 tablet by mouth daily., Disp: , Rfl:   •  atenolol (TENORMIN) 25 MG tablet, TAKE 1/2 TABLET BY MOUTH ONCE EVERY DAY, Disp: 45 tablet, Rfl: 3  •  atorvastatin (LIPITOR) 40 MG tablet, Take 1 tablet by mouth Daily., Disp: 90 tablet, Rfl: 3  •  cetirizine (zyrTEC) 10 MG tablet, TAKE 1 TABLET BY MOUTH EVERY DAY, Disp: 90 tablet, Rfl: 3  •  flecainide (TAMBOCOR) 100 MG tablet, Take 1 tablet by mouth 2 (Two) Times a Day., Disp: 180 tablet, Rfl: 3  •  levothyroxine (SYNTHROID, LEVOTHROID) 137 MCG tablet, Take 1 tablet by mouth Daily., Disp: 90 tablet, Rfl: 3  •  nitroglycerin (NITROSTAT) 0.4 MG SL tablet, 1 under the tongue as needed for angina, may repeat q5mins for up  "three doses, Disp: 30 tablet, Rfl: 3  •  Fluticasone Furoate-Vilanterol (Breo Ellipta) 200-25 MCG/INH inhaler, Inhale 1 puff Daily., Disp: 60 each, Rfl: 5    Allergies:  No Known Allergies    Objective     Vital Signs:   Vitals:    11/23/21 1307   BP: 101/79   Pulse: 73   Temp: 98.3 °F (36.8 °C)   SpO2: 95%   Weight: 80.5 kg (177 lb 6.4 oz)   Height: 167.6 cm (66\")   PainSc: 0-No pain     Body mass index is 28.63 kg/m².    Physical Exam:    Physical Exam  Constitutional:       Appearance: She is well-developed.   HENT:      Head: Normocephalic and atraumatic.      Right Ear: Tympanic membrane, ear canal and external ear normal.      Left Ear: Tympanic membrane, ear canal and external ear normal.      Nose: Nose normal.      Mouth/Throat:      Pharynx: Uvula midline.   Eyes:      Pupils: Pupils are equal, round, and reactive to light.   Cardiovascular:      Rate and Rhythm: Normal rate and regular rhythm.      Heart sounds: Normal heart sounds. No murmur heard.  No friction rub. No gallop.    Pulmonary:      Effort: Pulmonary effort is normal.      Breath sounds: Normal breath sounds.      Comments: Frequent cough that is dry and hacky   Abdominal:      General: Bowel sounds are normal.      Palpations: Abdomen is soft.      Tenderness: There is no abdominal tenderness.   Musculoskeletal:      Cervical back: Neck supple.   Lymphadenopathy:      Head:      Right side of head: No submental, submandibular, tonsillar, preauricular or posterior auricular adenopathy.      Left side of head: No submental, submandibular, tonsillar, preauricular or posterior auricular adenopathy.      Cervical: No cervical adenopathy.   Skin:     General: Skin is warm and dry.   Neurological:      Mental Status: She is alert and oriented to person, place, and time.   Psychiatric:         Behavior: Behavior normal.         Assessment / Plan     Assessment/Plan:   Problem List Items Addressed This Visit        Allergies and Adverse Reactions    " Seasonal allergic rhinitis    Relevant Medications    cetirizine (zyrTEC) 10 MG tablet       Cardiac and Vasculature    Supraventricular tachycardia (HCC)    Relevant Medications    nitroglycerin (NITROSTAT) 0.4 MG SL tablet    atenolol (TENORMIN) 25 MG tablet      Other Visit Diagnoses     Chronic cough    -  Primary    Relevant Medications    Fluticasone Furoate-Vilanterol (Breo Ellipta) 200-25 MCG/INH inhaler    Other Relevant Orders    CT Chest Without Contrast    Mild persistent reactive airway disease without complication        Relevant Medications    Fluticasone Furoate-Vilanterol (Breo Ellipta) 200-25 MCG/INH inhaler    Acquired hypothyroidism        Essential hypertension        Heartburn            -- stop flovent, start breo, check CT chest to rule out fibrosis or other. Consider PFT +/- pulm consult. She declines both today. Continue albuterol  -- stop lisinopril in case that is contributing to cough. BP on lower end. Continue other cardiac meds  -- she wishes to continue the use of tums for heartburn  -- continue levothyroxine. TSH appropriate    Follow up:  3 months    Electronically signed by JOSE Baez   11/23/2021 13:57 EST      Please note that portions of this note may have been completed with a voice recognition program. Efforts were made to edit the dictations, but occasionally words are mistranscribed.

## 2021-11-30 DIAGNOSIS — R05.3 CHRONIC COUGH: ICD-10-CM

## 2021-11-30 DIAGNOSIS — J45.30 MILD PERSISTENT REACTIVE AIRWAY DISEASE WITHOUT COMPLICATION: ICD-10-CM

## 2021-12-01 RX ORDER — ALBUTEROL SULFATE 90 UG/1
AEROSOL, METERED RESPIRATORY (INHALATION)
Qty: 8.5 G | Refills: 1 | Status: SHIPPED | OUTPATIENT
Start: 2021-12-01 | End: 2022-11-22 | Stop reason: SDUPTHER

## 2021-12-02 ENCOUNTER — HOSPITAL ENCOUNTER (OUTPATIENT)
Dept: CT IMAGING | Facility: HOSPITAL | Age: 66
Discharge: HOME | End: 2021-12-02
Admitting: NURSE PRACTITIONER

## 2021-12-02 DIAGNOSIS — R05.3 CHRONIC COUGH: ICD-10-CM

## 2021-12-02 PROCEDURE — 71250 CT THORAX DX C-: CPT

## 2021-12-06 NOTE — PROGRESS NOTES
CT chest shows no chronic lung disease.  Atelectasis was present at the lung bases.  Work on taking good deep breaths.  Liver showed hepatic cyst that are incidental.  Okay to monitor for now.   How are you doing with the Breo?  Do you want to proceed with any testing or pulmonary referral

## 2022-01-05 DIAGNOSIS — I49.3 PVC (PREMATURE VENTRICULAR CONTRACTION): ICD-10-CM

## 2022-01-05 RX ORDER — FLECAINIDE ACETATE 100 MG/1
TABLET ORAL
Qty: 180 TABLET | Refills: 3 | Status: SHIPPED | OUTPATIENT
Start: 2022-01-05 | End: 2022-12-20 | Stop reason: SDUPTHER

## 2022-02-03 ENCOUNTER — OFFICE VISIT (OUTPATIENT)
Dept: CARDIOLOGY | Facility: CLINIC | Age: 67
End: 2022-02-03

## 2022-02-03 VITALS
DIASTOLIC BLOOD PRESSURE: 79 MMHG | OXYGEN SATURATION: 98 % | SYSTOLIC BLOOD PRESSURE: 133 MMHG | TEMPERATURE: 97.1 F | BODY MASS INDEX: 29.25 KG/M2 | WEIGHT: 182 LBS | HEIGHT: 66 IN | HEART RATE: 66 BPM

## 2022-02-03 DIAGNOSIS — I10 PRIMARY HYPERTENSION: Primary | ICD-10-CM

## 2022-02-03 DIAGNOSIS — E78.5 DYSLIPIDEMIA: ICD-10-CM

## 2022-02-03 DIAGNOSIS — I49.3 PVC (PREMATURE VENTRICULAR CONTRACTION): ICD-10-CM

## 2022-02-03 DIAGNOSIS — I49.1 PAC (PREMATURE ATRIAL CONTRACTION): ICD-10-CM

## 2022-02-03 DIAGNOSIS — I47.1 SUPRAVENTRICULAR TACHYCARDIA: ICD-10-CM

## 2022-02-03 DIAGNOSIS — Z86.16 HISTORY OF COVID-19: ICD-10-CM

## 2022-02-03 DIAGNOSIS — I65.23 BILATERAL CAROTID ARTERY STENOSIS: ICD-10-CM

## 2022-02-03 DIAGNOSIS — R00.2 PALPITATIONS: ICD-10-CM

## 2022-02-03 PROCEDURE — 99214 OFFICE O/P EST MOD 30 MIN: CPT | Performed by: NURSE PRACTITIONER

## 2022-02-03 PROCEDURE — 93000 ELECTROCARDIOGRAM COMPLETE: CPT | Performed by: NURSE PRACTITIONER

## 2022-02-03 RX ORDER — ATENOLOL 25 MG/1
12.5 TABLET ORAL DAILY
Qty: 45 TABLET | Refills: 3
Start: 2022-02-03 | End: 2022-06-06

## 2022-02-03 NOTE — PROGRESS NOTES
Akash Ch is a 66 y.o. female     Chief Complaint   Patient presents with   • Follow-up   • Bilateral carotid artery stenosis       HPI    Problem list:    1. SVT - flecainide therapy  1.1 Event Monitor 5/7-5/20-16 - NSR with PACs and PVCs  1.2  event monitor 11/19-12/2/18-PACs and PVCs  2. Bradycardia  3. Left atrial tachycardia and right atrial isthmus-dependent flutter   3.1 RFA 10/14/2014  4. Grave’s disease, status post ablation with hypothyroidism.   5. Dyslipidemia.   6. Hypertension  6.1 Stress test 7/23/14 - low risk, no ischemia   6.2 Stress Test 7/12/17-no ischemia, low risk  6.3 Echo 7/21/14 - EF 55-60%; DD II; trace MR, TR  6.4 Echo 7/12/17-EF greater than 65%, diastolic dysfunction 1, trace TR, trace SC, PA 20-25  6.5 Echo 10/2/18 - EF 64%; borderline left atrial enlargement   6.6 stress test 8/5/19-no evidence of ischemia, post-rest EF 64%, occasional PAC  7. Cardiomegaly  7.1 CT Chest 8/5/15 - Cardiomegaly; suspected hepatosplenomegaly with fatty liver  8. Carotid Artery Disease   8.1 Carotid Artery US 10/2/18 - mild atherosclerotic was noted involving the carotid systems; antegrade flow both vertebral arteries.  8.2 carotid artery ultrasound 1/12/2021-16 to 49% stenosis distal right internal carotid artery, 16 to 49% stenosis mid and distal left internal carotid artery, antegrade flow both vertebral arteries      9.  Negative for orthostatic hypotension 1/4/2021      10.  History of COVID-19 2/22/2021    Patient is a 66-year-old female who presents today for follow-up.  She denies any chest pain or pressure.  She says she does have palpitations and they just happen.  However she has stopped her atenolol.  She will go back on the atenolol.  She denies any dizziness, presyncope, syncope, orthopnea, PND or edema.  She denies any shortness of breath with activity.  She has not been sleeping well as her  take his own life on January 6 and she found him.  Her mother-in-law that  they also cared for possibly last September.    Current Outpatient Medications on File Prior to Visit   Medication Sig Dispense Refill   • albuterol sulfate  (90 Base) MCG/ACT inhaler INHALE 2 PUFFS BY MOUTH EVERY 4 HOURS AS NEEDED FOR WHEEZING 8.5 g 1   • aspirin 81 MG tablet Take 1 tablet by mouth daily.     • atorvastatin (LIPITOR) 40 MG tablet Take 1 tablet by mouth Daily. 90 tablet 3   • cetirizine (zyrTEC) 10 MG tablet TAKE 1 TABLET BY MOUTH EVERY DAY 90 tablet 3   • flecainide (TAMBOCOR) 100 MG tablet TAKE 1 TABLET BY MOUTH TWICE DAILY 180 tablet 3   • Fluticasone Furoate-Vilanterol (Breo Ellipta) 200-25 MCG/INH inhaler Inhale 1 puff Daily. 60 each 5   • levothyroxine (SYNTHROID, LEVOTHROID) 137 MCG tablet Take 1 tablet by mouth Daily. 90 tablet 3   • nitroglycerin (NITROSTAT) 0.4 MG SL tablet 1 under the tongue as needed for angina, may repeat q5mins for up three doses 30 tablet 3   • [DISCONTINUED] atenolol (TENORMIN) 25 MG tablet TAKE 1/2 TABLET BY MOUTH ONCE EVERY DAY 45 tablet 3     No current facility-administered medications on file prior to visit.       ALLERGIES    Patient has no known allergies.    Past Medical History:   Diagnosis Date   • Allergic    • Arrhythmia    • Closed fracture of left fibula    • COVID-19 2021   • Enlarged heart    • Graves' disease    • Hx of mammogram 2016   • Hx of staphylococcal infection    • Hyperlipidemia    • Hypertension 2016       Social History     Socioeconomic History   • Marital status:    Tobacco Use   • Smoking status: Former Smoker     Packs/day: 0.50     Years: 25.00     Pack years: 12.50     Types: Cigarettes     Quit date: 2001     Years since quittin.7   • Smokeless tobacco: Never Used   Substance and Sexual Activity   • Alcohol use: No   • Drug use: No   • Sexual activity: Defer       Family History   Problem Relation Age of Onset   • Hypertension Mother    • Breast cancer Mother    • Bone cancer Mother   "  • Cancer Mother    • Heart attack Father    • Heart disease Father         pacemaker   • Hyperlipidemia Father    • Hypertension Father    • Diabetes Father         Passed away   • Breast cancer Sister    • Ovarian cancer Sister    • Cancer Sister         sister passed away   • Diabetes Paternal Grandmother        Review of Systems   Constitutional: Positive for fatigue (tired at time lack of not sleeping due to her  passed away 4 weeks ago ). Negative for appetite change, chills, diaphoresis and fever.   HENT: Negative for congestion, rhinorrhea and sore throat.    Eyes: Positive for visual disturbance (glasses).   Respiratory: Negative for cough, chest tightness, shortness of breath and wheezing.    Cardiovascular: Positive for palpitations (fluttering ( anxiety can cause it and other times it just happens) ). Negative for chest pain and leg swelling.   Gastrointestinal: Negative for abdominal pain, blood in stool, constipation, diarrhea, nausea and vomiting.   Endocrine: Negative for cold intolerance and heat intolerance.   Genitourinary: Positive for frequency (worse in the am she goes for the 1st 2hrs from drinking coffee ). Negative for difficulty urinating, dysuria, hematuria and urgency.   Musculoskeletal: Negative for arthralgias, back pain, joint swelling, neck pain and neck stiffness.   Skin: Negative for color change, pallor, rash and wound.   Allergic/Immunologic: Negative for environmental allergies and food allergies.   Neurological: Negative for dizziness, weakness, light-headedness, numbness and headaches.   Hematological: Bruises/bleeds easily (Bruises and bleeds ).   Psychiatric/Behavioral: Positive for sleep disturbance (hard to stay asleep , worse since she found her  dead 4 weeks ago he had taken his own life ).       Objective   /79 (BP Location: Left arm, Patient Position: Sitting)   Pulse 66   Temp 97.1 °F (36.2 °C)   Ht 167.6 cm (66\")   Wt 82.6 kg (182 lb)   LMP  " "(LMP Unknown)   SpO2 98%   BMI 29.38 kg/m²   Vitals:    02/03/22 0959   BP: 133/79   BP Location: Left arm   Patient Position: Sitting   Pulse: 66   Temp: 97.1 °F (36.2 °C)   SpO2: 98%   Weight: 82.6 kg (182 lb)   Height: 167.6 cm (66\")      Lab Results (most recent)     None        Physical Exam  Vitals reviewed.   Constitutional:       General: She is awake.      Appearance: Normal appearance. She is well-developed, well-groomed and overweight.   HENT:      Head: Normocephalic.   Eyes:      General: Lids are normal.      Comments: Wears glasses    Neck:      Vascular: No carotid bruit, hepatojugular reflux or JVD.   Cardiovascular:      Rate and Rhythm: Normal rate and regular rhythm.      Pulses:           Radial pulses are 2+ on the right side and 2+ on the left side.        Dorsalis pedis pulses are 2+ on the right side and 2+ on the left side.        Posterior tibial pulses are 2+ on the right side and 2+ on the left side.      Heart sounds: Normal heart sounds.   Pulmonary:      Effort: Pulmonary effort is normal.      Breath sounds: Normal breath sounds and air entry.   Abdominal:      General: Bowel sounds are normal.      Palpations: Abdomen is soft.   Musculoskeletal:      Right lower leg: No edema.      Left lower leg: No edema.   Skin:     General: Skin is warm and dry.   Neurological:      Mental Status: She is alert and oriented to person, place, and time.   Psychiatric:         Attention and Perception: Attention and perception normal.         Mood and Affect: Mood and affect normal.         Speech: Speech normal.         Behavior: Behavior normal. Behavior is cooperative.         Thought Content: Thought content normal.         Cognition and Memory: Cognition and memory normal.         Judgment: Judgment normal.         Procedure     ECG 12 Lead    Date/Time: 2/3/2022 10:18 AM  Performed by: Mary Guthrie APRN  Authorized by: Mary Guthrie APRN   Comparison: compared with previous ECG from " 7/9/2021  Comparison to previous ECG: Nonspecific t wave changes today  Rhythm: sinus rhythm  Rate: normal  BPM: 63  QRS axis: normal  Other findings: low voltage and T wave abnormality    Clinical impression: non-specific ECG  Comments: QT/QTc 428/435                 Assessment/Plan      Diagnosis Plan   1. Primary hypertension  ECG 12 Lead    atenolol (TENORMIN) 25 MG tablet   2. PAC (premature atrial contraction)  ECG 12 Lead    atenolol (TENORMIN) 25 MG tablet   3. Palpitations  ECG 12 Lead    atenolol (TENORMIN) 25 MG tablet   4. PVC (premature ventricular contraction)  ECG 12 Lead    atenolol (TENORMIN) 25 MG tablet   5. Supraventricular tachycardia (HCC)  ECG 12 Lead    atenolol (TENORMIN) 25 MG tablet   6. Dyslipidemia     7. Bilateral carotid artery stenosis     8. History of COVID-19         Return in about 6 months (around 8/3/2022).    Hypertension-patient will go back on her atenolol.  PACs/palpitations/PVCs/SVT-patient is on flecainide and will restart her atenolol.  Dyslipidemia-patient's on Lipitor and doing well.  Bilateral carotid artery disease-patient's on aspirin and statin.  History of COVID-19-no signs of failure.  Patient will continue her medication regimen with above-noted change.  She will follow-up in 6 months or sooner if any changes.  She is seeing Dr. Cage next month so he can determine if he wants her to continue the atenolol at that time or not.       Justina Swiftmarie  reports that she quit smoking about 20 years ago. Her smoking use included cigarettes. She has a 12.50 pack-year smoking history. She has never used smokeless tobacco.. Patient brought in medicine list to appointment, it's been reviewed with patient and med list was updated in the chart. .      Electronically signed by:

## 2022-03-02 DIAGNOSIS — E78.5 DYSLIPIDEMIA: ICD-10-CM

## 2022-03-02 DIAGNOSIS — I65.23 BILATERAL CAROTID ARTERY STENOSIS: ICD-10-CM

## 2022-03-02 RX ORDER — ATORVASTATIN CALCIUM 40 MG/1
40 TABLET, FILM COATED ORAL DAILY
Qty: 90 TABLET | Refills: 3 | Status: SHIPPED | OUTPATIENT
Start: 2022-03-02 | End: 2023-03-03 | Stop reason: SDUPTHER

## 2022-03-11 ENCOUNTER — OFFICE VISIT (OUTPATIENT)
Dept: CARDIOLOGY | Facility: CLINIC | Age: 67
End: 2022-03-11

## 2022-03-11 VITALS
DIASTOLIC BLOOD PRESSURE: 68 MMHG | HEART RATE: 58 BPM | HEIGHT: 66 IN | WEIGHT: 182 LBS | BODY MASS INDEX: 29.25 KG/M2 | SYSTOLIC BLOOD PRESSURE: 122 MMHG | OXYGEN SATURATION: 98 %

## 2022-03-11 DIAGNOSIS — I47.1 ATRIAL TACHYCARDIA: ICD-10-CM

## 2022-03-11 DIAGNOSIS — I10 PRIMARY HYPERTENSION: ICD-10-CM

## 2022-03-11 DIAGNOSIS — I49.1 PREMATURE ATRIAL COMPLEXES: Primary | ICD-10-CM

## 2022-03-11 DIAGNOSIS — I48.3 TYPICAL ATRIAL FLUTTER: ICD-10-CM

## 2022-03-11 PROCEDURE — 93000 ELECTROCARDIOGRAM COMPLETE: CPT | Performed by: INTERNAL MEDICINE

## 2022-03-11 PROCEDURE — 99213 OFFICE O/P EST LOW 20 MIN: CPT | Performed by: INTERNAL MEDICINE

## 2022-03-11 NOTE — PROGRESS NOTES
Justina Sams Jing  1955  [phone]    03/11/2022    [facility] CARDIOLOGY     Referring Provider: No ref. provider found     Sonali Maldonado, APRN  [address] [place] [address]    Chief Complaint   Patient presents with   • PAC       Problem List:  1. Supraventricular tachycardia:   a. History of tachypalpitations and emergency room visits dating back to at least 2-3 years ago.   b. Emergency room visit in July with subsequent hospitalization for supraventricular tachycardia at [facility] for initiation of flecainide.   c. Event recorder, July 2014, demonstrating runs of narrow QRS tachycardia, as fast as 190-200 beats per minute.   d. Echocardiogram, 07/21/2014: Normal left ventricular size and function with ejection fraction of 55%. No significant valvular insufficiency.   e. GXT Cardiolite, database normal, per patient, database incomplete.  f. Status post EP study and RFA of left atrial tachycardia and right atrial isthmus-dependent flutter on 10/04/2014.  g. Recurrent tachypalpitations with event monitor, 01/23/2015 thru 02/21/2015, showing sinus rhythm with occasional PAC and sinus arrhythmia and no SVT or atrial fibrillation.   h. Initiation of Tambocor therapy for suppression of PACs, 02/27/2015.   i. Echo 7/12/17 EF 65%, trace TR  j. Nuclear stress test 7/12/17 EF 75%, no ischemia  k. Echocardiogram 10/2/18: EF 60%, no significant valvular disease  l. Event monitor 11/19/18: Predominately NSR, <1% PAC's/PVC's  2. Grave’s disease, status post ablation with hypothyroidism.   3. Dyslipidemia.   4. Hypertension.   5. Skin cancer.   6. Surgical history:  a. D and C.   b. Skin excision       Allergies  No Known Allergies    Current Medications    Current Outpatient Medications:   •  albuterol sulfate  (90 Base) MCG/ACT inhaler, INHALE 2 PUFFS BY MOUTH EVERY 4 HOURS AS NEEDED FOR WHEEZING, Disp: 8.5 g, Rfl: 1  •  aspirin 81 MG tablet, Take  "1 tablet by mouth daily., Disp: , Rfl:   •  atenolol (TENORMIN) 25 MG tablet, Take 0.5 tablets by mouth Daily., Disp: 45 tablet, Rfl: 3  •  atorvastatin (LIPITOR) 40 MG tablet, TAKE 1 TABLET BY MOUTH DAILY, Disp: 90 tablet, Rfl: 3  •  cetirizine (zyrTEC) 10 MG tablet, TAKE 1 TABLET BY MOUTH EVERY DAY, Disp: 90 tablet, Rfl: 3  •  flecainide (TAMBOCOR) 100 MG tablet, TAKE 1 TABLET BY MOUTH TWICE DAILY, Disp: 180 tablet, Rfl: 3  •  Fluticasone Furoate-Vilanterol (Breo Ellipta) 200-25 MCG/INH inhaler, Inhale 1 puff Daily., Disp: 60 each, Rfl: 5  •  levothyroxine (SYNTHROID, LEVOTHROID) 137 MCG tablet, Take 1 tablet by mouth Daily., Disp: 90 tablet, Rfl: 3  •  nitroglycerin (NITROSTAT) 0.4 MG SL tablet, 1 under the tongue as needed for angina, may repeat q5mins for up three doses, Disp: 30 tablet, Rfl: 3    History of Present Illness     Pt presents for follow up of at/afl/pac . Since we last saw the pt, pt denies any AF episodes, SOB, CP, LH, and dizziness. Denies any hospitalizations, ER visits, bleeding, or TIA/CVA symptoms. Overall feels well. BP stable. Recently loss her  to suicide.     ROS:  General:  Denies fatigue, weight gain or loss  Cardiovascular:  Denies CP, PND, syncope, near syncope, edema or palpitations.  Pulmonary:  Denies KIRKPATRICK, cough, or wheezing      Vitals:    03/11/22 1304   BP: 122/68   BP Location: Right arm   Patient Position: Sitting   Pulse: 58   SpO2: 98%   Weight: 82.6 kg (182 lb)   Height: 167.6 cm (66\")     Body mass index is 29.38 kg/m².  PE:  General: NAD  Neck: no JVD, no carotid bruits, no TM  Heart RRR, NL S1, S2, S4 present, no rubs, murmurs  Lungs: CTA, no wheezes, rhonchi, or rales  Abd: soft, non-tender, NL BS  Ext: No musculoskeletal deformities, no edema, cyanosis, or clubbing  Psych: normal mood and affect    Diagnostic Data:        ECG 12 Lead    Date/Time: 3/11/2022 1:22 PM  Performed by: Jose Cage MD  Authorized by: Jose Cage MD   Comparison: compared " with previous ECG from 2/3/2022  Similar to previous ECG  Rhythm: sinus rhythm  BPM: 60              1. Premature atrial complexes    2. Typical atrial flutter (HCC)    3. Atrial tachycardia (HCC)    4. Primary hypertension          Plan:    1) PAC's: doing very well on meds  2) AT/AFL:  - S/p RFA Doing well, No clinical recurrence.    3) Anticoagulation:  - Continue ASA  4) HTN:  - Well controlled on meds  - Wt loss, exercise, salt reduction    F/up in 6 months

## 2022-03-30 ENCOUNTER — OFFICE VISIT (OUTPATIENT)
Dept: FAMILY MEDICINE CLINIC | Facility: CLINIC | Age: 67
End: 2022-03-30

## 2022-03-30 VITALS
TEMPERATURE: 96.8 F | HEIGHT: 66 IN | WEIGHT: 187 LBS | DIASTOLIC BLOOD PRESSURE: 78 MMHG | HEART RATE: 81 BPM | OXYGEN SATURATION: 95 % | BODY MASS INDEX: 30.05 KG/M2 | SYSTOLIC BLOOD PRESSURE: 120 MMHG

## 2022-03-30 DIAGNOSIS — F41.9 ANXIETY: ICD-10-CM

## 2022-03-30 DIAGNOSIS — I10 PRIMARY HYPERTENSION: Primary | ICD-10-CM

## 2022-03-30 DIAGNOSIS — F43.21 GRIEF: ICD-10-CM

## 2022-03-30 DIAGNOSIS — E03.9 ACQUIRED HYPOTHYROIDISM: ICD-10-CM

## 2022-03-30 DIAGNOSIS — E78.5 DYSLIPIDEMIA: ICD-10-CM

## 2022-03-30 PROBLEM — Z86.16 HISTORY OF COVID-19: Status: RESOLVED | Noted: 2021-04-14 | Resolved: 2022-03-30

## 2022-03-30 PROBLEM — R42 DIZZINESS: Status: RESOLVED | Noted: 2021-01-04 | Resolved: 2022-03-30

## 2022-03-30 PROCEDURE — 99214 OFFICE O/P EST MOD 30 MIN: CPT | Performed by: NURSE PRACTITIONER

## 2022-03-30 NOTE — PROGRESS NOTES
Subjective     Chief Complaint:    Chief Complaint   Patient presents with   • Hypertension     155/83       History of Present Illness:   HTN, on atenolol, more stress due to  passing away. Committed suicide in Jan. She is eating a lot and having crying episodes. She has gained some weight. The other day she felt anxious and jittery. She checked her BP at it was elevated. She takes atenolol.   Has been going to counseling, voluteering, and doing bible study  Anxiety episodes are happening rarely  Hypothyroidism on levothyroxine, single dose in AM  Has arrhythmia and follows with cards, (note reviewed), on flecainide and ASA  HLD on statin      Review of Systems  Gen- No fevers, chills  CV- No chest pain, palpitations  Resp- No cough, dyspnea  GI- No N/V/D, abd pain  Neuro-No dizziness, headaches      I have reviewed and/or updated the patient's past medical, surgical, family, social history and problem list as appropriate.     Medications:    Current Outpatient Medications:   •  albuterol sulfate  (90 Base) MCG/ACT inhaler, INHALE 2 PUFFS BY MOUTH EVERY 4 HOURS AS NEEDED FOR WHEEZING, Disp: 8.5 g, Rfl: 1  •  aspirin 81 MG tablet, Take 1 tablet by mouth daily., Disp: , Rfl:   •  atenolol (TENORMIN) 25 MG tablet, Take 0.5 tablets by mouth Daily., Disp: 45 tablet, Rfl: 3  •  atorvastatin (LIPITOR) 40 MG tablet, TAKE 1 TABLET BY MOUTH DAILY, Disp: 90 tablet, Rfl: 3  •  cetirizine (zyrTEC) 10 MG tablet, TAKE 1 TABLET BY MOUTH EVERY DAY, Disp: 90 tablet, Rfl: 3  •  flecainide (TAMBOCOR) 100 MG tablet, TAKE 1 TABLET BY MOUTH TWICE DAILY, Disp: 180 tablet, Rfl: 3  •  Fluticasone Furoate-Vilanterol (Breo Ellipta) 200-25 MCG/INH inhaler, Inhale 1 puff Daily., Disp: 60 each, Rfl: 5  •  levothyroxine (SYNTHROID, LEVOTHROID) 137 MCG tablet, Take 1 tablet by mouth Daily., Disp: 90 tablet, Rfl: 3  •  nitroglycerin (NITROSTAT) 0.4 MG SL tablet, 1 under the tongue as needed for angina, may repeat q5mins for up  "three doses, Disp: 30 tablet, Rfl: 3    Allergies:  No Known Allergies    Objective     Vital Signs:   Vitals:    03/30/22 1446   BP: 120/78   Pulse: 81   Temp: 96.8 °F (36 °C)   SpO2: 95%   Weight: 84.8 kg (187 lb)   Height: 167.6 cm (66\")   PainSc: 0-No pain     Body mass index is 30.18 kg/m².    Physical Exam:    Physical Exam  Vitals and nursing note reviewed.   Constitutional:       Appearance: Normal appearance. She is well-developed.   HENT:      Head: Normocephalic and atraumatic.   Eyes:      Pupils: Pupils are equal, round, and reactive to light.   Cardiovascular:      Rate and Rhythm: Normal rate and regular rhythm.      Heart sounds: Normal heart sounds.   Pulmonary:      Effort: Pulmonary effort is normal.      Breath sounds: Normal breath sounds.   Abdominal:      General: Bowel sounds are normal. There is no distension.      Palpations: Abdomen is soft.      Tenderness: There is no abdominal tenderness.   Musculoskeletal:      Cervical back: Neck supple.   Skin:     General: Skin is warm and dry.      Capillary Refill: Capillary refill takes less than 2 seconds.   Neurological:      General: No focal deficit present.      Mental Status: She is alert and oriented to person, place, and time.   Psychiatric:         Mood and Affect: Mood normal.         Behavior: Behavior normal.         Assessment / Plan     Assessment/Plan:   Problem List Items Addressed This Visit        Cardiac and Vasculature    Dyslipidemia    Relevant Medications    atorvastatin (LIPITOR) 40 MG tablet    Other Relevant Orders    Lipid Panel    Hypertension - Primary    Overview     1 Stress test 7/23/14 - low risk, no ischemia   6.2 Echo 7/21/14 - EF 55-60%; DD II; trace MR, TR             Relevant Medications    atenolol (TENORMIN) 25 MG tablet    Other Relevant Orders    CBC Auto Differential    Comprehensive Metabolic Panel    Lipid Panel       Endocrine and Metabolic    Acquired hypothyroidism    Relevant Medications    " levothyroxine (SYNTHROID, LEVOTHROID) 137 MCG tablet    atenolol (TENORMIN) 25 MG tablet    Other Relevant Orders    CBC Auto Differential    Comprehensive Metabolic Panel    TSH       Mental Health    Anxiety      Other Visit Diagnoses     Grief            -- BP stable, since elevations have been isolated incidence I recommend staying on current dose of meds  -- continue f/u with cards  -- labs as above  -- continue levothyroxine and adjust as needed  -- she declines any medication for anxiety at this time    Follow up:  yearly    Electronically signed by JOSE Baez   03/30/2022 15:17 EDT      Please note that portions of this note may have been completed with a voice recognition program. Efforts were made to edit the dictations, but occasionally words are mistranscribed.

## 2022-03-31 DIAGNOSIS — E03.9 ACQUIRED HYPOTHYROIDISM: ICD-10-CM

## 2022-03-31 LAB
ALBUMIN SERPL-MCNC: 4.3 G/DL (ref 3.8–4.8)
ALBUMIN/GLOB SERPL: 1.7 {RATIO} (ref 1.2–2.2)
ALP SERPL-CCNC: 189 IU/L (ref 44–121)
ALT SERPL-CCNC: 36 IU/L (ref 0–32)
AST SERPL-CCNC: 37 IU/L (ref 0–40)
BASOPHILS # BLD AUTO: 0.1 X10E3/UL (ref 0–0.2)
BASOPHILS NFR BLD AUTO: 1 %
BILIRUB SERPL-MCNC: 0.4 MG/DL (ref 0–1.2)
BUN SERPL-MCNC: 13 MG/DL (ref 8–27)
BUN/CREAT SERPL: 16 (ref 12–28)
CALCIUM SERPL-MCNC: 9.1 MG/DL (ref 8.7–10.3)
CHLORIDE SERPL-SCNC: 102 MMOL/L (ref 96–106)
CHOLEST SERPL-MCNC: 106 MG/DL (ref 100–199)
CO2 SERPL-SCNC: 25 MMOL/L (ref 20–29)
CREAT SERPL-MCNC: 0.83 MG/DL (ref 0.57–1)
EGFRCR SERPLBLD CKD-EPI 2021: 78 ML/MIN/1.73
EOSINOPHIL # BLD AUTO: 0.1 X10E3/UL (ref 0–0.4)
EOSINOPHIL NFR BLD AUTO: 1 %
ERYTHROCYTE [DISTWIDTH] IN BLOOD BY AUTOMATED COUNT: 12.6 % (ref 11.7–15.4)
GLOBULIN SER CALC-MCNC: 2.5 G/DL (ref 1.5–4.5)
GLUCOSE SERPL-MCNC: 70 MG/DL (ref 65–99)
HCT VFR BLD AUTO: 43.1 % (ref 34–46.6)
HDLC SERPL-MCNC: 41 MG/DL
HGB BLD-MCNC: 14.8 G/DL (ref 11.1–15.9)
IMM GRANULOCYTES # BLD AUTO: 0 X10E3/UL (ref 0–0.1)
IMM GRANULOCYTES NFR BLD AUTO: 0 %
LDLC SERPL CALC-MCNC: 41 MG/DL (ref 0–99)
LYMPHOCYTES # BLD AUTO: 2.2 X10E3/UL (ref 0.7–3.1)
LYMPHOCYTES NFR BLD AUTO: 23 %
MCH RBC QN AUTO: 31.6 PG (ref 26.6–33)
MCHC RBC AUTO-ENTMCNC: 34.3 G/DL (ref 31.5–35.7)
MCV RBC AUTO: 92 FL (ref 79–97)
MONOCYTES # BLD AUTO: 0.8 X10E3/UL (ref 0.1–0.9)
MONOCYTES NFR BLD AUTO: 9 %
NEUTROPHILS # BLD AUTO: 6.4 X10E3/UL (ref 1.4–7)
NEUTROPHILS NFR BLD AUTO: 66 %
PLATELET # BLD AUTO: 230 X10E3/UL (ref 150–450)
POTASSIUM SERPL-SCNC: 3.6 MMOL/L (ref 3.5–5.2)
PROT SERPL-MCNC: 6.8 G/DL (ref 6–8.5)
RBC # BLD AUTO: 4.68 X10E6/UL (ref 3.77–5.28)
SODIUM SERPL-SCNC: 142 MMOL/L (ref 134–144)
TRIGL SERPL-MCNC: 139 MG/DL (ref 0–149)
TSH SERPL DL<=0.005 MIU/L-ACNC: 2.25 UIU/ML (ref 0.45–4.5)
VLDLC SERPL CALC-MCNC: 24 MG/DL (ref 5–40)
WBC # BLD AUTO: 9.7 X10E3/UL (ref 3.4–10.8)

## 2022-03-31 RX ORDER — LEVOTHYROXINE SODIUM 137 UG/1
137 TABLET ORAL DAILY
Qty: 90 TABLET | Refills: 3 | Status: SHIPPED | OUTPATIENT
Start: 2022-03-31 | End: 2022-06-01 | Stop reason: SDUPTHER

## 2022-05-24 ENCOUNTER — OFFICE VISIT (OUTPATIENT)
Dept: FAMILY MEDICINE CLINIC | Facility: CLINIC | Age: 67
End: 2022-05-24

## 2022-05-24 VITALS
HEART RATE: 64 BPM | HEIGHT: 66 IN | WEIGHT: 189 LBS | OXYGEN SATURATION: 98 % | DIASTOLIC BLOOD PRESSURE: 80 MMHG | TEMPERATURE: 97.1 F | BODY MASS INDEX: 30.37 KG/M2 | SYSTOLIC BLOOD PRESSURE: 115 MMHG

## 2022-05-24 DIAGNOSIS — M25.562 ACUTE PAIN OF LEFT KNEE: ICD-10-CM

## 2022-05-24 DIAGNOSIS — M79.605 PAIN AND SWELLING OF LOWER EXTREMITY, LEFT: Primary | ICD-10-CM

## 2022-05-24 DIAGNOSIS — M79.89 PAIN AND SWELLING OF LOWER EXTREMITY, LEFT: Primary | ICD-10-CM

## 2022-05-24 PROCEDURE — 99214 OFFICE O/P EST MOD 30 MIN: CPT | Performed by: NURSE PRACTITIONER

## 2022-05-25 ENCOUNTER — HOSPITAL ENCOUNTER (OUTPATIENT)
Dept: GENERAL RADIOLOGY | Facility: HOSPITAL | Age: 67
Discharge: HOME | End: 2022-05-25

## 2022-05-25 ENCOUNTER — HOSPITAL ENCOUNTER (OUTPATIENT)
Dept: ULTRASOUND IMAGING | Facility: HOSPITAL | Age: 67
Discharge: HOME | End: 2022-05-25

## 2022-05-25 DIAGNOSIS — M79.605 PAIN AND SWELLING OF LOWER EXTREMITY, LEFT: ICD-10-CM

## 2022-05-25 DIAGNOSIS — M79.89 PAIN AND SWELLING OF LOWER EXTREMITY, LEFT: ICD-10-CM

## 2022-05-25 DIAGNOSIS — M25.562 ACUTE PAIN OF LEFT KNEE: ICD-10-CM

## 2022-05-25 PROCEDURE — 73562 X-RAY EXAM OF KNEE 3: CPT

## 2022-05-25 PROCEDURE — 93971 EXTREMITY STUDY: CPT

## 2022-05-25 NOTE — PROGRESS NOTES
Doppler negative, options discussed for arthritits, she wishes to use OTC meds and supportive care at this time. Can consider PT, ortho, rx meds in the future if needed

## 2022-06-01 DIAGNOSIS — E03.9 ACQUIRED HYPOTHYROIDISM: ICD-10-CM

## 2022-06-01 RX ORDER — LEVOTHYROXINE SODIUM 137 UG/1
137 TABLET ORAL DAILY
Qty: 90 TABLET | Refills: 3 | Status: SHIPPED | OUTPATIENT
Start: 2022-06-01

## 2022-06-06 DIAGNOSIS — I10 PRIMARY HYPERTENSION: ICD-10-CM

## 2022-06-06 DIAGNOSIS — R00.2 PALPITATIONS: ICD-10-CM

## 2022-06-06 DIAGNOSIS — I49.1 PAC (PREMATURE ATRIAL CONTRACTION): ICD-10-CM

## 2022-06-06 DIAGNOSIS — I49.3 PVC (PREMATURE VENTRICULAR CONTRACTION): ICD-10-CM

## 2022-06-06 DIAGNOSIS — I47.1 SUPRAVENTRICULAR TACHYCARDIA: ICD-10-CM

## 2022-06-06 RX ORDER — ATENOLOL 25 MG/1
TABLET ORAL
Qty: 45 TABLET | Refills: 3 | Status: SHIPPED | OUTPATIENT
Start: 2022-06-06

## 2022-08-03 ENCOUNTER — OFFICE VISIT (OUTPATIENT)
Dept: CARDIOLOGY | Facility: CLINIC | Age: 67
End: 2022-08-03

## 2022-08-03 VITALS
TEMPERATURE: 97.2 F | HEART RATE: 62 BPM | SYSTOLIC BLOOD PRESSURE: 113 MMHG | OXYGEN SATURATION: 95 % | DIASTOLIC BLOOD PRESSURE: 69 MMHG | WEIGHT: 187 LBS | HEIGHT: 66 IN | BODY MASS INDEX: 30.05 KG/M2

## 2022-08-03 DIAGNOSIS — I65.23 BILATERAL CAROTID ARTERY STENOSIS: ICD-10-CM

## 2022-08-03 DIAGNOSIS — I49.3 PVC (PREMATURE VENTRICULAR CONTRACTION): ICD-10-CM

## 2022-08-03 DIAGNOSIS — I47.1 SUPRAVENTRICULAR TACHYCARDIA: ICD-10-CM

## 2022-08-03 DIAGNOSIS — I49.1 PAC (PREMATURE ATRIAL CONTRACTION): ICD-10-CM

## 2022-08-03 DIAGNOSIS — I51.7 CARDIOMEGALY: ICD-10-CM

## 2022-08-03 DIAGNOSIS — R00.2 PALPITATIONS: ICD-10-CM

## 2022-08-03 DIAGNOSIS — R07.89 OTHER CHEST PAIN: ICD-10-CM

## 2022-08-03 DIAGNOSIS — I10 PRIMARY HYPERTENSION: Primary | ICD-10-CM

## 2022-08-03 DIAGNOSIS — R60.9 PERIPHERAL EDEMA: ICD-10-CM

## 2022-08-03 DIAGNOSIS — E78.5 DYSLIPIDEMIA: ICD-10-CM

## 2022-08-03 DIAGNOSIS — I51.89 DIASTOLIC DYSFUNCTION: ICD-10-CM

## 2022-08-03 PROBLEM — R60.0 PERIPHERAL EDEMA: Status: ACTIVE | Noted: 2022-08-03

## 2022-08-03 PROCEDURE — 99214 OFFICE O/P EST MOD 30 MIN: CPT | Performed by: NURSE PRACTITIONER

## 2022-08-03 RX ORDER — NITROGLYCERIN 0.4 MG/1
TABLET SUBLINGUAL
Qty: 30 TABLET | Refills: 3 | Status: SHIPPED | OUTPATIENT
Start: 2022-08-03
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
## 2022-09-28 ENCOUNTER — OFFICE VISIT (OUTPATIENT)
Dept: FAMILY MEDICINE CLINIC | Facility: CLINIC | Age: 67
End: 2022-09-28

## 2022-09-28 VITALS
SYSTOLIC BLOOD PRESSURE: 108 MMHG | WEIGHT: 190.6 LBS | OXYGEN SATURATION: 96 % | HEART RATE: 65 BPM | TEMPERATURE: 97.6 F | BODY MASS INDEX: 30.63 KG/M2 | DIASTOLIC BLOOD PRESSURE: 68 MMHG | HEIGHT: 66 IN

## 2022-09-28 DIAGNOSIS — M54.2 NECK PAIN: Primary | ICD-10-CM

## 2022-09-28 DIAGNOSIS — M25.512 CHRONIC LEFT SHOULDER PAIN: ICD-10-CM

## 2022-09-28 DIAGNOSIS — R29.898 DECREASED ROM OF NECK: ICD-10-CM

## 2022-09-28 DIAGNOSIS — G89.29 CHRONIC LEFT SHOULDER PAIN: ICD-10-CM

## 2022-09-28 PROCEDURE — 99213 OFFICE O/P EST LOW 20 MIN: CPT | Performed by: NURSE PRACTITIONER

## 2022-09-28 RX ORDER — METHYLPREDNISOLONE 4 MG/1
TABLET ORAL
Qty: 21 TABLET | Refills: 0 | Status: SHIPPED | OUTPATIENT
Start: 2022-09-28 | End: 2022-11-21

## 2022-09-28 RX ORDER — MELOXICAM 15 MG/1
15 TABLET ORAL DAILY
COMMUNITY
Start: 2022-08-15

## 2022-09-28 NOTE — PROGRESS NOTES
Subjective     Chief Complaint:    Chief Complaint   Patient presents with   • Neck Pain     pT STS HER NECK HURTS ALL THE TIME DOESN'T KNOW  IF IT IS THE WAY SHE IS SLEEPIN.   • Shoulder Pain       History of Present Illness:   Left neck pain, bilateral shoulder pain x several months. Has arthritis in her neck.   She wakes up with neck and shoulder pain, pain is constant. Nothing makes it worse or better. She does have some tingling in her hands and arms. Some weakness. No trouble with buttons. No trouble with walking or gait.   She has been taking mobic and it is helping some, just helping her knees and hands.   Getting up from chair takes a little longer,       Review of Systems  Gen- No fevers, chills  CV- No chest pain, palpitations  Resp- No cough, dyspnea  GI- No N/V/D, abd pain  Neuro-No dizziness, headaches      I have reviewed and/or updated the patient's past medical, surgical, family, social history and problem list as appropriate.     Medications:    Current Outpatient Medications:   •  albuterol sulfate  (90 Base) MCG/ACT inhaler, INHALE 2 PUFFS BY MOUTH EVERY 4 HOURS AS NEEDED FOR WHEEZING, Disp: 8.5 g, Rfl: 1  •  aspirin 81 MG tablet, Take 1 tablet by mouth daily., Disp: , Rfl:   •  atenolol (TENORMIN) 25 MG tablet, TAKE 1/2 TABLET BY MOUTH ONCE EVERY DAY, Disp: 45 tablet, Rfl: 3  •  atorvastatin (LIPITOR) 40 MG tablet, TAKE 1 TABLET BY MOUTH DAILY, Disp: 90 tablet, Rfl: 3  •  cetirizine (zyrTEC) 10 MG tablet, TAKE 1 TABLET BY MOUTH EVERY DAY, Disp: 90 tablet, Rfl: 3  •  flecainide (TAMBOCOR) 100 MG tablet, TAKE 1 TABLET BY MOUTH TWICE DAILY, Disp: 180 tablet, Rfl: 3  •  Fluticasone Furoate-Vilanterol (Breo Ellipta) 200-25 MCG/INH inhaler, Inhale 1 puff Daily., Disp: 60 each, Rfl: 5  •  levothyroxine (SYNTHROID, LEVOTHROID) 137 MCG tablet, Take 1 tablet by mouth Daily., Disp: 90 tablet, Rfl: 3  •  meloxicam (MOBIC) 15 MG tablet, Take 15 mg by mouth Daily., Disp: , Rfl:   •  nitroglycerin  "(NITROSTAT) 0.4 MG SL tablet, 1 under the tongue as needed for angina, may repeat q5mins for up three doses, Disp: 30 tablet, Rfl: 3  •  methylPREDNISolone (MEDROL) 4 MG dose pack, Take as directed on package instructions., Disp: 21 tablet, Rfl: 0    Allergies:  No Known Allergies    Objective     Vital Signs:   Vitals:    09/28/22 1413   BP: 108/68   Pulse: 65   Temp: 97.6 °F (36.4 °C)   SpO2: 96%   Weight: 86.5 kg (190 lb 9.6 oz)   Height: 167.6 cm (65.98\")   PainSc:   8     Body mass index is 30.78 kg/m².    Physical Exam:    Physical Exam  Vitals and nursing note reviewed.   Constitutional:       Appearance: She is well-developed.   HENT:      Head: Normocephalic and atraumatic.   Eyes:      Pupils: Pupils are equal, round, and reactive to light.   Cardiovascular:      Rate and Rhythm: Normal rate and regular rhythm.      Heart sounds: Normal heart sounds.   Pulmonary:      Effort: Pulmonary effort is normal.      Breath sounds: Normal breath sounds.   Abdominal:      General: Bowel sounds are normal. There is no distension.      Palpations: Abdomen is soft.      Tenderness: There is no abdominal tenderness.   Musculoskeletal:      Right shoulder: Tenderness present. Normal range of motion.      Left shoulder: Tenderness present. Decreased range of motion.      Cervical back: Neck supple. Tenderness present. Pain with movement present. Decreased range of motion.   Skin:     General: Skin is warm and dry.   Neurological:      Mental Status: She is alert and oriented to person, place, and time.   Psychiatric:         Behavior: Behavior normal.         Assessment / Plan     Assessment/Plan:   Problem List Items Addressed This Visit    None     Visit Diagnoses     Neck pain    -  Primary    Relevant Medications    methylPREDNISolone (MEDROL) 4 MG dose pack    Other Relevant Orders    XR Spine Cervical 2 or 3 View    Decreased ROM of neck        Relevant Medications    methylPREDNISolone (MEDROL) 4 MG dose pack    " Other Relevant Orders    XR Spine Cervical 2 or 3 View    Chronic left shoulder pain        Relevant Medications    methylPREDNISolone (MEDROL) 4 MG dose pack    Other Relevant Orders    XR Shoulder 2+ View Left        -- start home exercise program as discussed, she is traveling and cannot do formal PT, medrol DP, check xray, f/u in 6 weeks, if no improvement will consider MRI  -- ddx cervical disc disease, rotator cuff arthropathy, PMR    Follow up:  As needed    Electronically signed by JOSE Baez   09/28/2022 14:26 EDT      Please note that portions of this note may have been completed with a voice recognition program. Efforts were made to edit the dictations, but occasionally words are mistranscribed.

## 2022-09-29 ENCOUNTER — HOSPITAL ENCOUNTER (OUTPATIENT)
Dept: GENERAL RADIOLOGY | Facility: HOSPITAL | Age: 67
Discharge: HOME | End: 2022-09-29
Admitting: NURSE PRACTITIONER

## 2022-09-29 DIAGNOSIS — M54.2 NECK PAIN: ICD-10-CM

## 2022-09-29 DIAGNOSIS — G89.29 CHRONIC LEFT SHOULDER PAIN: ICD-10-CM

## 2022-09-29 DIAGNOSIS — R29.898 DECREASED ROM OF NECK: ICD-10-CM

## 2022-09-29 DIAGNOSIS — M25.512 CHRONIC LEFT SHOULDER PAIN: ICD-10-CM

## 2022-09-29 PROCEDURE — 72040 X-RAY EXAM NECK SPINE 2-3 VW: CPT

## 2022-09-29 PROCEDURE — 73030 X-RAY EXAM OF SHOULDER: CPT

## 2022-09-30 NOTE — PROGRESS NOTES
Neck shows moderate degenerative changes. Continue with current plan. May need MRI neck but will discuss at f/u visit

## 2022-11-21 ENCOUNTER — OFFICE VISIT (OUTPATIENT)
Dept: FAMILY MEDICINE CLINIC | Facility: CLINIC | Age: 67
End: 2022-11-21

## 2022-11-21 VITALS
HEIGHT: 66 IN | OXYGEN SATURATION: 95 % | HEART RATE: 58 BPM | DIASTOLIC BLOOD PRESSURE: 70 MMHG | WEIGHT: 193.2 LBS | BODY MASS INDEX: 31.05 KG/M2 | TEMPERATURE: 97.2 F | SYSTOLIC BLOOD PRESSURE: 105 MMHG

## 2022-11-21 DIAGNOSIS — Z78.0 POST-MENOPAUSAL: ICD-10-CM

## 2022-11-21 DIAGNOSIS — J45.30 MILD PERSISTENT REACTIVE AIRWAY DISEASE WITHOUT COMPLICATION: Primary | ICD-10-CM

## 2022-11-21 DIAGNOSIS — I10 PRIMARY HYPERTENSION: ICD-10-CM

## 2022-11-21 DIAGNOSIS — I47.1 SUPRAVENTRICULAR TACHYCARDIA: ICD-10-CM

## 2022-11-21 DIAGNOSIS — E03.9 ACQUIRED HYPOTHYROIDISM: ICD-10-CM

## 2022-11-21 DIAGNOSIS — G89.29 CHRONIC LEFT SHOULDER PAIN: ICD-10-CM

## 2022-11-21 DIAGNOSIS — M25.512 CHRONIC LEFT SHOULDER PAIN: ICD-10-CM

## 2022-11-21 DIAGNOSIS — M54.2 NECK PAIN: ICD-10-CM

## 2022-11-21 PROBLEM — J45.909 REACTIVE AIRWAY DISEASE: Status: ACTIVE | Noted: 2022-11-21

## 2022-11-21 PROCEDURE — 99214 OFFICE O/P EST MOD 30 MIN: CPT | Performed by: NURSE PRACTITIONER

## 2022-11-21 NOTE — PROGRESS NOTES
Subjective     Chief Complaint:    Chief Complaint   Patient presents with   • Neck Pain     Pt sts FU        History of Present Illness:   Here for f/u on neck and shoulder pain. She is not having any pain anymore. She is exercising as directed. Steroids helped some.   She continues to have knee pain, bilateral. Stairs make it worse. Avoiding stairs helps.   She continues mobic  Arrhythmia, follows with cardiology, on tambaocor, atenolol  HLD on statin  Hypothyroidism on replacement  Asthma on breo, controlled, uses rafaela      Review of Systems  Gen- No fevers, chills  CV- No chest pain, palpitations  Resp- No cough, dyspnea  GI- No N/V/D, abd pain  Neuro-No dizziness, headaches      I have reviewed and/or updated the patient's past medical, surgical, family, social history and problem list as appropriate.     Medications:    Current Outpatient Medications:   •  albuterol sulfate  (90 Base) MCG/ACT inhaler, INHALE 2 PUFFS BY MOUTH EVERY 4 HOURS AS NEEDED FOR WHEEZING, Disp: 8.5 g, Rfl: 1  •  aspirin 81 MG tablet, Take 1 tablet by mouth daily., Disp: , Rfl:   •  atenolol (TENORMIN) 25 MG tablet, TAKE 1/2 TABLET BY MOUTH ONCE EVERY DAY, Disp: 45 tablet, Rfl: 3  •  atorvastatin (LIPITOR) 40 MG tablet, TAKE 1 TABLET BY MOUTH DAILY, Disp: 90 tablet, Rfl: 3  •  cetirizine (zyrTEC) 10 MG tablet, TAKE 1 TABLET BY MOUTH EVERY DAY, Disp: 90 tablet, Rfl: 3  •  flecainide (TAMBOCOR) 100 MG tablet, TAKE 1 TABLET BY MOUTH TWICE DAILY, Disp: 180 tablet, Rfl: 3  •  Fluticasone Furoate-Vilanterol (Breo Ellipta) 200-25 MCG/INH inhaler, Inhale 1 puff Daily., Disp: 60 each, Rfl: 5  •  levothyroxine (SYNTHROID, LEVOTHROID) 137 MCG tablet, Take 1 tablet by mouth Daily., Disp: 90 tablet, Rfl: 3  •  meloxicam (MOBIC) 15 MG tablet, Take 15 mg by mouth Daily., Disp: , Rfl:   •  nitroglycerin (NITROSTAT) 0.4 MG SL tablet, 1 under the tongue as needed for angina, may repeat q5mins for up three doses, Disp: 30 tablet, Rfl:  "3    Allergies:  No Known Allergies    Objective     Vital Signs:   Vitals:    11/21/22 1606   BP: 105/70   Pulse: 58   Temp: 97.2 °F (36.2 °C)   SpO2: 95%   Weight: 87.6 kg (193 lb 3.2 oz)   Height: 167.6 cm (65.98\")   PainSc: 0-No pain     Body mass index is 31.2 kg/m².    Physical Exam:    Physical Exam  Vitals and nursing note reviewed.   Constitutional:       Appearance: She is well-developed.   HENT:      Head: Normocephalic and atraumatic.   Eyes:      Pupils: Pupils are equal, round, and reactive to light.   Cardiovascular:      Rate and Rhythm: Normal rate and regular rhythm.      Heart sounds: Normal heart sounds.   Pulmonary:      Effort: Pulmonary effort is normal.      Breath sounds: Normal breath sounds.   Abdominal:      General: Bowel sounds are normal. There is no distension.      Palpations: Abdomen is soft.      Tenderness: There is no abdominal tenderness.   Musculoskeletal:      Cervical back: Normal range of motion and neck supple.   Skin:     General: Skin is warm and dry.      Capillary Refill: Capillary refill takes less than 2 seconds.   Neurological:      General: No focal deficit present.      Mental Status: She is alert and oriented to person, place, and time.   Psychiatric:         Mood and Affect: Mood normal.         Behavior: Behavior normal.         Assessment / Plan     Assessment/Plan:   Problem List Items Addressed This Visit        Cardiac and Vasculature    Supraventricular tachycardia (HCC)    Relevant Medications    atenolol (TENORMIN) 25 MG tablet    nitroglycerin (NITROSTAT) 0.4 MG SL tablet    Hypertension    Overview     1 Stress test 7/23/14 - low risk, no ischemia   6.2 Echo 7/21/14 - EF 55-60%; DD II; trace MR, TR           Relevant Medications    atenolol (TENORMIN) 25 MG tablet       Endocrine and Metabolic    Acquired hypothyroidism    Relevant Medications    levothyroxine (SYNTHROID, LEVOTHROID) 137 MCG tablet    atenolol (TENORMIN) 25 MG tablet       Pulmonary and " Pneumonias    Reactive airway disease - Primary    Relevant Medications    Fluticasone Furoate-Vilanterol (Breo Ellipta) 200-25 MCG/INH inhaler    albuterol sulfate  (90 Base) MCG/ACT inhaler   Other Visit Diagnoses     Neck pain        Chronic left shoulder pain        Post-menopausal        Relevant Orders    DEXA Bone Density Axial        -- neck and shoulder pain have resolved, encouraged her to continue PT at home, continue mobic  -- can refer to ortho if she wishes for her knees  -- continue breo, ashtma stable  -- keep f/u with cards  -- continue levothyroxine, TSH appropriate      Follow up:  Return in about 4 months (around 3/21/2023) for Medicare Wellness.      Electronically signed by JOSE Baez   11/21/2022 16:14 EST      Please note that portions of this note were completed with a voice recognition program.

## 2022-11-22 DIAGNOSIS — J45.30 MILD PERSISTENT REACTIVE AIRWAY DISEASE WITHOUT COMPLICATION: ICD-10-CM

## 2022-11-22 DIAGNOSIS — R05.3 CHRONIC COUGH: ICD-10-CM

## 2022-11-22 RX ORDER — FLUTICASONE FUROATE AND VILANTEROL 200; 25 UG/1; UG/1
1 POWDER RESPIRATORY (INHALATION) DAILY
Qty: 60 EACH | Refills: 5 | Status: SHIPPED | OUTPATIENT
Start: 2022-11-22

## 2022-11-22 RX ORDER — ALBUTEROL SULFATE 90 UG/1
2 AEROSOL, METERED RESPIRATORY (INHALATION) EVERY 4 HOURS PRN
Qty: 8.5 G | Refills: 1 | Status: SHIPPED | OUTPATIENT
Start: 2022-11-22

## 2022-11-23 NOTE — TELEPHONE ENCOUNTER
Called patient to notify her that refills had been sent in. She states that the pharmacy told her that one of the medications needed an insurance PA, but she didn't know which one. I am assuming it is the Breo. I did advise her that med PA's can take several days and with the holiday weekend, it may be a bit longer than usual. She said she was fine with that since she can at least get one of the medications picked up. Asked if someone could call her on an update for the PA when it is being processed.

## 2022-12-03 DIAGNOSIS — J30.2 SEASONAL ALLERGIC RHINITIS: ICD-10-CM

## 2022-12-03 DIAGNOSIS — R09.82 PND (POST-NASAL DRIP): ICD-10-CM

## 2022-12-05 RX ORDER — CETIRIZINE HYDROCHLORIDE 10 MG/1
TABLET ORAL
Qty: 90 TABLET | Refills: 3 | Status: SHIPPED | OUTPATIENT
Start: 2022-12-05

## 2022-12-20 DIAGNOSIS — I49.3 PVC (PREMATURE VENTRICULAR CONTRACTION): ICD-10-CM

## 2022-12-20 RX ORDER — FLECAINIDE ACETATE 100 MG/1
100 TABLET ORAL 2 TIMES DAILY
Qty: 180 TABLET | Refills: 3 | Status: SHIPPED | OUTPATIENT
Start: 2022-12-20

## 2023-01-10 NOTE — TELEPHONE ENCOUNTER
PATIENT NOTIFIED.    SHE HAS SPOKE WITH THE PHARMACY AND THEY ARE GOING TO EXCHANGE HER MEDICATIONS.   Stable

## 2023-03-03 DIAGNOSIS — E78.5 DYSLIPIDEMIA: ICD-10-CM

## 2023-03-03 DIAGNOSIS — I65.23 BILATERAL CAROTID ARTERY STENOSIS: ICD-10-CM

## 2023-03-03 RX ORDER — ATORVASTATIN CALCIUM 40 MG/1
40 TABLET, FILM COATED ORAL DAILY
Qty: 90 TABLET | Refills: 3 | Status: SHIPPED | OUTPATIENT
Start: 2023-03-03

## 2023-04-18 ENCOUNTER — TELEPHONE (OUTPATIENT)
Dept: CARDIOLOGY | Facility: CLINIC | Age: 68
End: 2023-04-18
Payer: OTHER GOVERNMENT

## 2023-04-18 NOTE — TELEPHONE ENCOUNTER
For the HUB to read to pt:       LVM informing patient that Mary has relocated out of state. If patient calls back, please schedule with the provider of their choosing. Thank you

## 2023-05-12 ENCOUNTER — OFFICE VISIT (OUTPATIENT)
Dept: CARDIOLOGY | Facility: CLINIC | Age: 68
End: 2023-05-12
Payer: MEDICARE

## 2023-05-12 VITALS
HEIGHT: 66 IN | HEART RATE: 61 BPM | WEIGHT: 185 LBS | SYSTOLIC BLOOD PRESSURE: 126 MMHG | BODY MASS INDEX: 29.73 KG/M2 | DIASTOLIC BLOOD PRESSURE: 72 MMHG | OXYGEN SATURATION: 95 %

## 2023-05-12 DIAGNOSIS — I49.1 PAC (PREMATURE ATRIAL CONTRACTION): Primary | ICD-10-CM

## 2023-05-12 DIAGNOSIS — I47.1 ATRIAL TACHYCARDIA: ICD-10-CM

## 2023-05-12 DIAGNOSIS — I10 PRIMARY HYPERTENSION: ICD-10-CM

## 2023-05-12 NOTE — PROGRESS NOTES
Justina Swiftmarie  1955  [phone]    05/12/2023    [facility]     Referring Provider: No ref. provider found     Sonali Maldonado, APRN  [address] [place] [address]    Chief Complaint   Patient presents with   • premature atrial complexes        Problem List:     1. Supraventricular tachycardia:   a. History of tachypalpitations and emergency room visits dating back to at least 2-3 years ago.   b. Emergency room visit in July with subsequent hospitalization for supraventricular tachycardia at [facility] for initiation of flecainide.   c. Event recorder, July 2014, demonstrating runs of narrow QRS tachycardia, as fast as 190-200 beats per minute.   d. Echocardiogram, 07/21/2014: Normal left ventricular size and function with ejection fraction of 55%. No significant valvular insufficiency.   e. GXT Cardiolite, database normal, per patient, database incomplete.  f. Status post EP study and RFA of left atrial tachycardia and right atrial isthmus-dependent flutter on 10/04/2014.  g. Recurrent tachypalpitations with event monitor, 01/23/2015 thru 02/21/2015, showing sinus rhythm with occasional PAC and sinus arrhythmia and no SVT or atrial fibrillation.   h. Initiation of Tambocor therapy for suppression of PACs, 02/27/2015.   i. Echo 7/12/17 EF 65%, trace TR  j. Nuclear stress test 7/12/17 EF 75%, no ischemia  k. Echocardiogram 10/2/18: EF 60%, no significant valvular disease  l. Event monitor 11/19/18: Predominately NSR, <1% PAC's/PVC's  2. Grave’s disease, status post ablation with hypothyroidism.   3. Dyslipidemia.   4. Hypertension.   5. Skin cancer.   6. Surgical history:  a. D and C.   b. Skin excision     Allergies  Allergies   Allergen Reactions   • Latex Rash       Current Medications    Current Outpatient Medications:   •  albuterol sulfate  (90 Base) MCG/ACT inhaler, Inhale 2 puffs Every 4 (Four) Hours As Needed for  "Wheezing., Disp: 8.5 g, Rfl: 1  •  aspirin 81 MG tablet, Take 1 tablet by mouth Daily., Disp: , Rfl:   •  atenolol (TENORMIN) 25 MG tablet, TAKE 1/2 TABLET BY MOUTH ONCE EVERY DAY, Disp: 45 tablet, Rfl: 3  •  atorvastatin (LIPITOR) 40 MG tablet, Take 1 tablet by mouth Daily., Disp: 90 tablet, Rfl: 3  •  cetirizine (zyrTEC) 10 MG tablet, TAKE 1 TABLET BY MOUTH EVERY DAY, Disp: 90 tablet, Rfl: 3  •  flecainide (TAMBOCOR) 100 MG tablet, Take 1 tablet by mouth 2 (Two) Times a Day., Disp: 180 tablet, Rfl: 3  •  Fluticasone Furoate-Vilanterol (Breo Ellipta) 200-25 MCG/ACT inhaler, Inhale 1 puff Daily., Disp: 60 each, Rfl: 5  •  levothyroxine (SYNTHROID, LEVOTHROID) 137 MCG tablet, Take 1 tablet by mouth Daily., Disp: 90 tablet, Rfl: 3  •  meloxicam (MOBIC) 15 MG tablet, Take 1 tablet by mouth Daily., Disp: , Rfl:   •  nitroglycerin (NITROSTAT) 0.4 MG SL tablet, 1 under the tongue as needed for angina, may repeat q5mins for up three doses, Disp: 30 tablet, Rfl: 3    History of Present Illness:      Pt presents for follow up of PACs, atrial flutter, atrial tachycardia, HTN. Since we last saw the pt, pt denies any AF/palpitations episodes, SOB, CP, LH, and dizziness, syncope.  Denies any hospitalizations, ER visits, bleeding, or TIA/CVA symptoms. Overall feels well.    ROS:  General:  Denies fatigue, weight gain or loss  Cardiovascular:  Denies CP, PND, syncope, near syncope, edema or palpitations.  Pulmonary:  Denies KIRKPATRICK, cough, or wheezing      Vitals:    05/12/23 1229   BP: 126/72   BP Location: Left arm   Patient Position: Sitting   Pulse: 61   SpO2: 95%   Weight: 83.9 kg (185 lb)   Height: 167.6 cm (66\")     Body mass index is 29.86 kg/m².  PE:  General: NAD. A & O x 3   Neck: no JVD, no carotid bruits, no TM  Heart RRR, NL S1, S2, S4 present, no rubs, murmurs  Lungs: CTA, no wheezes, rhonchi, or rales  Abd: soft, non-tender, NL BS  Ext: No musculoskeletal deformities, no edema, cyanosis, or clubbing  Psych: normal mood " and affect    Diagnostic Data:        ECG 12 Lead    Date/Time: 5/12/2023 12:56 PM  Performed by: Zo Schuler PA  Authorized by: Zo Schuler PA   Comparison: compared with previous ECG from 3/11/2022  Similar to previous ECG  Rhythm: sinus rhythm  BPM: 58                     1. PAC (premature atrial contraction)    2. Left atrial tachycardia and right atrial isthmus-dependent flutter     3. Primary hypertension          Plan:  1) PAC's: doing very well on meds  2) AT/AFL:  - S/p RFA Doing well, No clinical recurrence.    3) Anticoagulation:  - Continue ASA  4) HTN:  - Well controlled on meds  - Wt loss, exercise, salt reduction      F/up in 9 months    Electronically signed by VICENTE Gardner, 05/12/23, 12:56 PM EDT.

## 2023-05-31 ENCOUNTER — OFFICE VISIT (OUTPATIENT)
Dept: CARDIOLOGY | Facility: CLINIC | Age: 68
End: 2023-05-31

## 2023-05-31 VITALS
HEART RATE: 93 BPM | SYSTOLIC BLOOD PRESSURE: 133 MMHG | OXYGEN SATURATION: 93 % | WEIGHT: 192.6 LBS | BODY MASS INDEX: 30.95 KG/M2 | DIASTOLIC BLOOD PRESSURE: 78 MMHG | HEIGHT: 66 IN

## 2023-05-31 DIAGNOSIS — F17.211 CIGARETTE NICOTINE DEPENDENCE IN REMISSION: ICD-10-CM

## 2023-05-31 DIAGNOSIS — I47.1 SUPRAVENTRICULAR TACHYCARDIA: Primary | ICD-10-CM

## 2023-05-31 DIAGNOSIS — R07.89 OTHER CHEST PAIN: ICD-10-CM

## 2023-05-31 DIAGNOSIS — E78.5 DYSLIPIDEMIA: ICD-10-CM

## 2023-05-31 DIAGNOSIS — I65.23 BILATERAL CAROTID ARTERY STENOSIS: ICD-10-CM

## 2023-05-31 DIAGNOSIS — I47.1 ATRIAL TACHYCARDIA: ICD-10-CM

## 2023-05-31 DIAGNOSIS — I10 PRIMARY HYPERTENSION: ICD-10-CM

## 2023-05-31 DIAGNOSIS — I49.1 PAC (PREMATURE ATRIAL CONTRACTION): ICD-10-CM

## 2023-05-31 DIAGNOSIS — R00.2 PALPITATIONS: ICD-10-CM

## 2023-05-31 DIAGNOSIS — I49.3 PVC (PREMATURE VENTRICULAR CONTRACTION): ICD-10-CM

## 2023-05-31 DIAGNOSIS — Z98.890 HISTORY OF RADIOFREQUENCY ABLATION (RFA) PROCEDURE FOR CARDIAC ARRHYTHMIA: ICD-10-CM

## 2023-05-31 PROCEDURE — 3075F SYST BP GE 130 - 139MM HG: CPT | Performed by: SPECIALIST

## 2023-05-31 PROCEDURE — 99214 OFFICE O/P EST MOD 30 MIN: CPT | Performed by: SPECIALIST

## 2023-05-31 PROCEDURE — 3078F DIAST BP <80 MM HG: CPT | Performed by: SPECIALIST

## 2023-05-31 RX ORDER — FLECAINIDE ACETATE 100 MG/1
100 TABLET ORAL 2 TIMES DAILY
Qty: 180 TABLET | Refills: 3 | Status: SHIPPED | OUTPATIENT
Start: 2023-05-31

## 2023-05-31 RX ORDER — NITROGLYCERIN 0.4 MG/1
TABLET SUBLINGUAL
Qty: 30 TABLET | Refills: 3 | Status: SHIPPED | OUTPATIENT
Start: 2023-05-31

## 2023-05-31 RX ORDER — ATORVASTATIN CALCIUM 40 MG/1
40 TABLET, FILM COATED ORAL DAILY
Qty: 90 TABLET | Refills: 3 | Status: SHIPPED | OUTPATIENT
Start: 2023-05-31

## 2023-05-31 RX ORDER — ATENOLOL 25 MG/1
12.5 TABLET ORAL DAILY
Qty: 45 TABLET | Refills: 3 | Status: SHIPPED | OUTPATIENT
Start: 2023-05-31

## 2023-05-31 NOTE — PROGRESS NOTES
Subjective   Follow up, atrial flutter post ablation  Justina Ch is a 68 y.o. female who presents to day for Follow-up (6mth).    CHIEF COMPLIANT  Chief Complaint   Patient presents with   • Follow-up     6mth       Active Problems:  Problem List Items Addressed This Visit        Cardiac and Vasculature    Supraventricular tachycardia - Primary    Relevant Medications    atenolol (TENORMIN) 25 MG tablet    nitroglycerin (NITROSTAT) 0.4 MG SL tablet    Palpitations    Relevant Medications    atenolol (TENORMIN) 25 MG tablet    Left atrial tachycardia and right atrial isthmus-dependent flutter     Overview     1 RFA 10/14/2014           Relevant Medications    atenolol (TENORMIN) 25 MG tablet    nitroglycerin (NITROSTAT) 0.4 MG SL tablet    Dyslipidemia    Relevant Medications    atorvastatin (LIPITOR) 40 MG tablet    Other Relevant Orders    Lipid Panel    Comprehensive Metabolic Panel    Hypertension    Overview     1 Stress test 7/23/14 - low risk, no ischemia   6.2 Echo 7/21/14 - EF 55-60%; DD II; trace MR, TR           Relevant Medications    atenolol (TENORMIN) 25 MG tablet    PAC (premature atrial contraction)    Relevant Medications    atenolol (TENORMIN) 25 MG tablet    nitroglycerin (NITROSTAT) 0.4 MG SL tablet    Bilateral carotid artery stenosis    Relevant Medications    atorvastatin (LIPITOR) 40 MG tablet    PVC (premature ventricular contraction)    Relevant Medications    atenolol (TENORMIN) 25 MG tablet    flecainide (TAMBOCOR) 100 MG tablet    nitroglycerin (NITROSTAT) 0.4 MG SL tablet    History of radiofrequency ablation (RFA) procedure for cardiac arrhythmia       Tobacco    Cigarette nicotine dependence in remission   Other Visit Diagnoses     Other chest pain        Relevant Medications    nitroglycerin (NITROSTAT) 0.4 MG SL tablet          HPI  HPI  She has been doing very well she barely has any flutter palpitations maybe once a month for few seconds since the flecainide was increased  to 100 mg twice daily she has been doing great she is no chest pain or told no dizziness no edema occasionally she gets shortness of breath especially with the change of weather with allergies otherwise she has been doing great  PRIOR MEDS  Current Outpatient Medications on File Prior to Visit   Medication Sig Dispense Refill   • albuterol sulfate  (90 Base) MCG/ACT inhaler Inhale 2 puffs Every 4 (Four) Hours As Needed for Wheezing. 8.5 g 1   • aspirin 81 MG tablet Take 1 tablet by mouth Daily.     • cetirizine (zyrTEC) 10 MG tablet TAKE 1 TABLET BY MOUTH EVERY DAY 90 tablet 3   • Fluticasone Furoate-Vilanterol (Breo Ellipta) 200-25 MCG/ACT inhaler Inhale 1 puff Daily. 60 each 5   • levothyroxine (SYNTHROID, LEVOTHROID) 137 MCG tablet Take 1 tablet by mouth Daily. 90 tablet 3   • meloxicam (MOBIC) 15 MG tablet Take 1 tablet by mouth Daily.     • [DISCONTINUED] atenolol (TENORMIN) 25 MG tablet TAKE 1/2 TABLET BY MOUTH ONCE EVERY DAY 45 tablet 3   • [DISCONTINUED] atorvastatin (LIPITOR) 40 MG tablet Take 1 tablet by mouth Daily. 90 tablet 3   • [DISCONTINUED] flecainide (TAMBOCOR) 100 MG tablet Take 1 tablet by mouth 2 (Two) Times a Day. 180 tablet 3   • [DISCONTINUED] nitroglycerin (NITROSTAT) 0.4 MG SL tablet 1 under the tongue as needed for angina, may repeat q5mins for up three doses 30 tablet 3     No current facility-administered medications on file prior to visit.       ALLERGIES  Latex    HISTORY  1. Supraventricular tachycardia:   a. History of tachypalpitations and emergency room visits dating back to at least 2-3 years ago.   b. Emergency room visit in July with subsequent hospitalization for supraventricular tachycardia at [facility] for initiation of flecainide.   c. Event recorder, July 2014, demonstrating runs of narrow QRS tachycardia, as fast as 190-200 beats per minute.   d. Echocardiogram, 07/21/2014: Normal left ventricular size and function with ejection  fraction of 55%. No significant valvular insufficiency.   e. GXT Cardiolite, database normal, per patient, database incomplete.  f. Status post EP study and RFA of left atrial tachycardia and right atrial isthmus-dependent flutter on 10/04/2014.  g. Recurrent tachypalpitations with event monitor, 2015 thru 2015, showing sinus rhythm with occasional PAC and sinus arrhythmia and no SVT or atrial fibrillation.   h. Initiation of Tambocor therapy for suppression of PACs, 2015.   i. Echo 17 EF 65%, trace TR  j. Nuclear stress test 17 EF 75%, no ischemia  k. Echocardiogram 10/2/18: EF 60%, no significant valvular disease  l. Event monitor 18: Predominately NSR, <1% PAC's/PVC's  2. Grave’s disease, status post ablation with hypothyroidism.   3. Dyslipidemia.   4. Hypertension.   5. Skin cancer.   6. Surgical history:  a. D and C.   b. Skin excision      Past Medical History:   Diagnosis Date   • Abnormal cardiovascular stress test 2016   • Allergic    • Arrhythmia    • Cancer    • Closed fracture of left fibula    • COVID-19 2021   • Dizziness 2021   • Enlarged heart    • Graves disease 2016    status post ablation with hypothyroidism.     • Graves' disease    • History of COVID-19 2021   • Hx of mammogram 2016   • Hx of staphylococcal infection    • Hyperlipidemia    • Hypertension 2016   • Pre-syncope 2016   • Snoring 2016       Social History     Socioeconomic History   • Marital status:    Tobacco Use   • Smoking status: Former     Packs/day: 0.50     Years: 25.00     Pack years: 12.50     Types: Cigarettes     Start date: 1972     Quit date: 2001     Years since quittin.0   • Smokeless tobacco: Never   Vaping Use   • Vaping Use: Never used   Substance and Sexual Activity   • Alcohol use: No   • Drug use: No   • Sexual activity: Not Currently       Family History   Problem Relation Age of Onset   •  "Hypertension Mother    • Breast cancer Mother    • Bone cancer Mother    • Cancer Mother    • Heart attack Father    • Heart disease Father         pacemaker   • Hyperlipidemia Father    • Hypertension Father    • Diabetes Father         Passed away   • Breast cancer Sister    • Ovarian cancer Sister    • Cancer Sister         sister passed away   • Diabetes Paternal Grandmother        Review of Systems   Constitutional: Positive for fatigue.   HENT: Positive for rhinorrhea. Negative for congestion and sore throat.    Eyes: Positive for visual disturbance (Glasses daily ).   Respiratory: Positive for shortness of breath (thinks it may be allergy related). Negative for apnea, cough, choking, chest tightness, wheezing and stridor.    Cardiovascular: Positive for palpitations (\"very little\" ). Negative for chest pain and leg swelling (LLE edema, doesn't go down overnight. Normal x-ray on that leg. ).   Gastrointestinal: Negative.    Genitourinary: Negative.    Musculoskeletal: Positive for arthralgias.   Allergic/Immunologic: Positive for environmental allergies.   Neurological: Negative for dizziness, syncope, weakness, light-headedness, numbness and headaches.   Hematological: Bruises/bleeds easily (Bruises- believes related to ASA).   Psychiatric/Behavioral: Positive for sleep disturbance (Off and on sleep isues, denies SoB at HS ).       Objective     VITALS: /78   Pulse 93   Ht 167.6 cm (66\")   Wt 87.4 kg (192 lb 9.6 oz)   LMP  (LMP Unknown)   SpO2 93%   BMI 31.09 kg/m²     LABS:   Lab Results (most recent)     None          IMAGING:   No Images in the past 120 days found..    EXAM:  Physical Exam  Vitals reviewed.   Constitutional:       Appearance: She is well-developed.   HENT:      Head: Normocephalic and atraumatic.   Eyes:      Pupils: Pupils are equal, round, and reactive to light.   Neck:      Thyroid: No thyromegaly.      Vascular: No JVD.   Cardiovascular:      Rate and Rhythm: Normal rate " and regular rhythm.      Heart sounds: Normal heart sounds. No murmur heard.    No friction rub. No gallop.   Pulmonary:      Effort: Pulmonary effort is normal. No respiratory distress.      Breath sounds: Normal breath sounds. No stridor. No wheezing or rales.   Chest:      Chest wall: No tenderness.   Musculoskeletal:         General: No tenderness or deformity.      Cervical back: Neck supple.   Skin:     General: Skin is warm and dry.   Neurological:      Mental Status: She is alert and oriented to person, place, and time.      Cranial Nerves: No cranial nerve deficit.      Coordination: Coordination normal.         Procedure   Procedures       Assessment & Plan     Diagnoses and all orders for this visit:    1. Supraventricular tachycardia (Primary)  -     atenolol (TENORMIN) 25 MG tablet; Take 0.5 tablets by mouth Daily.  Dispense: 45 tablet; Refill: 3    2. Palpitations  -     atenolol (TENORMIN) 25 MG tablet; Take 0.5 tablets by mouth Daily.  Dispense: 45 tablet; Refill: 3    3. Left atrial tachycardia and right atrial isthmus-dependent flutter     4. History of radiofrequency ablation (RFA) procedure for cardiac arrhythmia    5. Primary hypertension  -     atenolol (TENORMIN) 25 MG tablet; Take 0.5 tablets by mouth Daily.  Dispense: 45 tablet; Refill: 3    6. Dyslipidemia  -     atorvastatin (LIPITOR) 40 MG tablet; Take 1 tablet by mouth Daily.  Dispense: 90 tablet; Refill: 3  -     Lipid Panel; Future  -     Comprehensive Metabolic Panel; Future    7. Cigarette nicotine dependence in remission    8. PAC (premature atrial contraction)  -     atenolol (TENORMIN) 25 MG tablet; Take 0.5 tablets by mouth Daily.  Dispense: 45 tablet; Refill: 3    9. PVC (premature ventricular contraction)  -     atenolol (TENORMIN) 25 MG tablet; Take 0.5 tablets by mouth Daily.  Dispense: 45 tablet; Refill: 3  -     flecainide (TAMBOCOR) 100 MG tablet; Take 1 tablet by mouth 2 (Two) Times a Day.  Dispense: 180 tablet; Refill:  3    10. Bilateral carotid artery stenosis  -     atorvastatin (LIPITOR) 40 MG tablet; Take 1 tablet by mouth Daily.  Dispense: 90 tablet; Refill: 3    11. Other chest pain  -     nitroglycerin (NITROSTAT) 0.4 MG SL tablet; 1 under the tongue as needed for angina, may repeat q5mins for up three doses  Dispense: 30 tablet; Refill: 3    1.  Since the radiofrequency ablation for her SVT and atrial flutter she has been doing really well now the flecainide was increased to 100 mg twice daily and this has been controlling her symptoms pretty well so we will continue current management  2.  She is taking daily aspirin  3.  Her blood pressure is controlled we will continue current management 4.  I do not have any recent lipid profile but her lipid profile back a year ago on 2022 was excellent she is going to have recent lipid by her primary care physician  4.  She quit the smoking 22 years ago  Electronically signed by Andrew Milner MD, 23, 11:22 AM EDT.      Return in about 6 months (around 2023).      Advance Care Planning   ACP discussion was declined by the patient. Patient has an advance directive (not in EMR), copy requested.             MEDS ORDERED DURING VISIT:  New Medications Ordered This Visit   Medications   • atenolol (TENORMIN) 25 MG tablet     Sig: Take 0.5 tablets by mouth Daily.     Dispense:  45 tablet     Refill:  3   • atorvastatin (LIPITOR) 40 MG tablet     Sig: Take 1 tablet by mouth Daily.     Dispense:  90 tablet     Refill:  3   • flecainide (TAMBOCOR) 100 MG tablet     Sig: Take 1 tablet by mouth 2 (Two) Times a Day.     Dispense:  180 tablet     Refill:  3   • nitroglycerin (NITROSTAT) 0.4 MG SL tablet     Si under the tongue as needed for angina, may repeat q5mins for up three doses     Dispense:  30 tablet     Refill:  3       As always, Sonali Maldonado APRN  I appreciate very much the opportunity to participate in the cardiovascular care of your patients. Please do not  hesitate to call me with any questions with regards to Justina Ch evaluation and management.         This document has been electronically signed by Andrew Milner MD  May 31, 2023 11:25 EDT    This note is dictated utilizing voice recognition software.

## 2023-08-08 DIAGNOSIS — R05.3 CHRONIC COUGH: ICD-10-CM

## 2023-08-08 DIAGNOSIS — J45.30 MILD PERSISTENT REACTIVE AIRWAY DISEASE WITHOUT COMPLICATION: ICD-10-CM

## 2023-08-09 RX ORDER — ALBUTEROL SULFATE 90 UG/1
AEROSOL, METERED RESPIRATORY (INHALATION)
Qty: 8.5 G | Refills: 1 | Status: SHIPPED | OUTPATIENT
Start: 2023-08-09 | End: 2023-08-14 | Stop reason: SDUPTHER

## 2023-08-14 ENCOUNTER — OFFICE VISIT (OUTPATIENT)
Dept: FAMILY MEDICINE CLINIC | Facility: CLINIC | Age: 68
End: 2023-08-14
Payer: MEDICARE

## 2023-08-14 VITALS
DIASTOLIC BLOOD PRESSURE: 80 MMHG | HEART RATE: 64 BPM | SYSTOLIC BLOOD PRESSURE: 130 MMHG | BODY MASS INDEX: 31.52 KG/M2 | OXYGEN SATURATION: 97 % | HEIGHT: 66 IN | WEIGHT: 196.13 LBS

## 2023-08-14 DIAGNOSIS — J45.30 MILD PERSISTENT REACTIVE AIRWAY DISEASE WITHOUT COMPLICATION: ICD-10-CM

## 2023-08-14 DIAGNOSIS — M25.542 JOINT PAIN IN BOTH HANDS: ICD-10-CM

## 2023-08-14 DIAGNOSIS — E55.9 VITAMIN D DEFICIENCY: ICD-10-CM

## 2023-08-14 DIAGNOSIS — I10 PRIMARY HYPERTENSION: ICD-10-CM

## 2023-08-14 DIAGNOSIS — Z00.00 MEDICARE ANNUAL WELLNESS VISIT, SUBSEQUENT: Primary | ICD-10-CM

## 2023-08-14 DIAGNOSIS — Z12.11 SCREEN FOR COLON CANCER: ICD-10-CM

## 2023-08-14 DIAGNOSIS — I47.1 SUPRAVENTRICULAR TACHYCARDIA: ICD-10-CM

## 2023-08-14 DIAGNOSIS — E03.9 ACQUIRED HYPOTHYROIDISM: ICD-10-CM

## 2023-08-14 DIAGNOSIS — R05.3 CHRONIC COUGH: ICD-10-CM

## 2023-08-14 DIAGNOSIS — M25.541 JOINT PAIN IN BOTH HANDS: ICD-10-CM

## 2023-08-14 DIAGNOSIS — E78.5 DYSLIPIDEMIA: ICD-10-CM

## 2023-08-14 PROCEDURE — 3079F DIAST BP 80-89 MM HG: CPT | Performed by: NURSE PRACTITIONER

## 2023-08-14 PROCEDURE — 1170F FXNL STATUS ASSESSED: CPT | Performed by: NURSE PRACTITIONER

## 2023-08-14 PROCEDURE — G0439 PPPS, SUBSEQ VISIT: HCPCS | Performed by: NURSE PRACTITIONER

## 2023-08-14 PROCEDURE — 99214 OFFICE O/P EST MOD 30 MIN: CPT | Performed by: NURSE PRACTITIONER

## 2023-08-14 PROCEDURE — 1160F RVW MEDS BY RX/DR IN RCRD: CPT | Performed by: NURSE PRACTITIONER

## 2023-08-14 PROCEDURE — 3075F SYST BP GE 130 - 139MM HG: CPT | Performed by: NURSE PRACTITIONER

## 2023-08-14 PROCEDURE — 1159F MED LIST DOCD IN RCRD: CPT | Performed by: NURSE PRACTITIONER

## 2023-08-14 RX ORDER — ALBUTEROL SULFATE 90 UG/1
2 AEROSOL, METERED RESPIRATORY (INHALATION) EVERY 4 HOURS PRN
Qty: 8.5 G | Refills: 1 | Status: SHIPPED | OUTPATIENT
Start: 2023-08-14

## 2023-08-14 NOTE — PROGRESS NOTES
The ABCs of the Annual Wellness Visit  Subsequent Medicare Wellness Visit    Subjective    Justina Ch is a 68 y.o. female who presents for a Subsequent Medicare Wellness Visit.    The following portions of the patient's history were reviewed and   updated as appropriate: allergies, current medications, past family history, past medical history, past social history, past surgical history, and problem list.    Compared to one year ago, the patient feels her physical   health is better.    Compared to one year ago, the patient feels her mental   health is better.    Recent Hospitalizations:  She was not admitted to the hospital during the last year.       Current Medical Providers:  Patient Care Team:  Sonali Maldonado APRN as PCP - General (Nurse Practitioner)  Jose Cage MD as Consulting Physician (Cardiac Electrophysiology)    Outpatient Medications Prior to Visit   Medication Sig Dispense Refill    aspirin 81 MG tablet Take 1 tablet by mouth Daily.      atenolol (TENORMIN) 25 MG tablet Take 0.5 tablets by mouth Daily. 45 tablet 3    atorvastatin (LIPITOR) 40 MG tablet Take 1 tablet by mouth Daily. 90 tablet 3    cetirizine (zyrTEC) 10 MG tablet TAKE 1 TABLET BY MOUTH EVERY DAY 90 tablet 3    flecainide (TAMBOCOR) 100 MG tablet Take 1 tablet by mouth 2 (Two) Times a Day. 180 tablet 3    Fluticasone Furoate-Vilanterol (Breo Ellipta) 200-25 MCG/ACT inhaler Inhale 1 puff Daily. 60 each 5    levothyroxine (SYNTHROID, LEVOTHROID) 137 MCG tablet Take 1 tablet by mouth Daily. 90 tablet 1    meloxicam (MOBIC) 15 MG tablet Take 1 tablet by mouth Daily.      nitroglycerin (NITROSTAT) 0.4 MG SL tablet 1 under the tongue as needed for angina, may repeat q5mins for up three doses 30 tablet 3    albuterol sulfate  (90 Base) MCG/ACT inhaler INHALE 2 PUFFS BY MOUTH EVERY 4 HOURS AS NEEDED FOR WHEEZING 8.5 g 1     No facility-administered medications prior to visit.       No opioid medication identified on  "active medication list. I have reviewed chart for other potential  high risk medication/s and harmful drug interactions in the elderly.        Aspirin is on active medication list. Aspirin use is indicated based on review of current medical condition/s. Pros and cons of this therapy have been discussed today. Benefits of this medication outweigh potential harm.  Patient has been encouraged to continue taking this medication.  .      Patient Active Problem List   Diagnosis    Anxiety    Supraventricular tachycardia    Palpitations    Acquired hypothyroidism    Cardiomegaly    Left atrial tachycardia and right atrial isthmus-dependent flutter     Dyslipidemia    Hypertension    Vitamin D deficiency    PAC (premature atrial contraction)    Seasonal allergic rhinitis    Herpes simplex    Bilateral carotid artery stenosis    PVC (premature ventricular contraction)    Peripheral edema    Diastolic dysfunction    Reactive airway disease    History of radiofrequency ablation (RFA) procedure for cardiac arrhythmia    Cigarette nicotine dependence in remission     Advance Care Planning   Advance Care Planning     Advance Directive is not on file.  ACP discussion was held with the patient during this visit. Patient has an advance directive (not in EMR), copy requested.     Objective    Vitals:    08/14/23 0947   BP: 130/80   Pulse: 64   SpO2: 97%   Weight: 89 kg (196 lb 2 oz)   Height: 167.6 cm (65.98\")   PainSc: 0-No pain     Estimated body mass index is 31.67 kg/mý as calculated from the following:    Height as of this encounter: 167.6 cm (65.98\").    Weight as of this encounter: 89 kg (196 lb 2 oz).    BMI is >= 30 and <35. (Class 1 Obesity). The following options were offered after discussion;: weight loss educational material (shared in after visit summary)      Does the patient have evidence of cognitive impairment? No          HEALTH RISK ASSESSMENT    Smoking Status:  Social History     Tobacco Use   Smoking Status " Former    Packs/day: 0.50    Years: 25.00    Pack years: 12.50    Types: Cigarettes    Start date: 1972    Quit date: 2001    Years since quittin.2    Passive exposure: Never   Smokeless Tobacco Never     Alcohol Consumption:  Social History     Substance and Sexual Activity   Alcohol Use No     Fall Risk Screen:    ROOPA Fall Risk Assessment was completed, and patient is at LOW risk for falls.Assessment completed on:2023    Depression Screenin/14/2023     9:57 AM   PHQ-2/PHQ-9 Depression Screening   Little Interest or Pleasure in Doing Things 0-->not at all   Feeling Down, Depressed or Hopeless 0-->not at all   PHQ-9: Brief Depression Severity Measure Score 0       Health Habits and Functional and Cognitive Screenin/14/2023     9:55 AM   Functional & Cognitive Status   Do you have difficulty preparing food and eating? No   Do you have difficulty bathing yourself, getting dressed or grooming yourself? No   Do you have difficulty using the toilet? No   Do you have difficulty moving around from place to place? No   Do you have trouble with steps or getting out of a bed or a chair? No   Current Diet Well Balanced Diet        Current Diet Comment WITH LIMITED JUNK FOOD.   Dental Exam Up to date   Eye Exam Up to date   Exercise (times per week) 7 times per week   Current Exercises Include Yard Work;Walking   Do you need help using the phone?  No   Are you deaf or do you have serious difficulty hearing?  No   Do you need help to go to places out of walking distance? No   Do you need help shopping? No   Do you need help preparing meals?  No   Do you need help with housework?  No   Do you need help with laundry? No   Do you need help taking your medications? No   Do you need help managing money? No   Do you ever drive or ride in a car without wearing a seat belt? No   Have you felt unusual stress, anger or loneliness in the last month? No   Who do you live with? Alone   If you need  help, do you have trouble finding someone available to you? Yes   Have you been bothered in the last four weeks by sexual problems? No   Do you have difficulty concentrating, remembering or making decisions? No       Age-appropriate Screening Schedule:  Refer to the list below for future screening recommendations based on patient's age, sex and/or medical conditions. Orders for these recommended tests are listed in the plan section. The patient has been provided with a written plan.    Health Maintenance   Topic Date Due    COLORECTAL CANCER SCREENING  07/07/2022    LIPID PANEL  03/30/2023    COVID-19 Vaccine (1) 08/16/2023 (Originally 1955)    Pneumococcal Vaccine 65+ (1 - PCV) 08/14/2024 (Originally 3/21/2022)    INFLUENZA VACCINE  10/01/2023    ANNUAL WELLNESS VISIT  08/14/2024    MAMMOGRAM  12/02/2024    DXA SCAN  08/08/2025    TDAP/TD VACCINES (2 - Td or Tdap) 02/17/2027    HEPATITIS C SCREENING  Completed    PAP SMEAR  Discontinued    ZOSTER VACCINE  Discontinued                  CMS Preventative Services Quick Reference  Risk Factors Identified During Encounter  None Identified  The above risks/problems have been discussed with the patient.  Pertinent information has been shared with the patient in the After Visit Summary.  An After Visit Summary and PPPS were made available to the patient.    Follow Up:   Next Medicare Wellness visit to be scheduled in 1 year.       Additional E&M Note during same encounter follows:  Patient has multiple medical problems which are significant and separately identifiable that require additional work above and beyond the Medicare Wellness Visit.      Chief Complaint  Medicare Wellness-subsequent (SUBSEQUENT MEDICARE WELLNESS VISIT.) and Labs Only (WOULD LIKE TO DISCUSS HAVING BLOOD WORK COMPLETED. DOES HAVE LABS FROM CARDIOLOGIST FROM [facility].)    Subjective        HPI  Justina Ch is also being seen today  Knee pain with stairs, knee stiff in the morning  Has pain in  "her hands with some changes of joints, not much AM pain or stiffness  She continues mobic  Arrhythmia, follows with cardiology and EP, on tambaocor, atenolol, Note reviewed, no tachy palps  HLD on statin, cards obtaining FLP  Hypothyroidism on replacement  Asthma on breo, controlled, uses rafaela, had some issus with elevation changes when she visited [place] recently          Objective   Vital Signs:  /80   Pulse 64   Ht 167.6 cm (65.98\")   Wt 89 kg (196 lb 2 oz)   SpO2 97%   BMI 31.67 kg/mý     Physical Exam  Constitutional:       Appearance: Normal appearance. She is well-developed.   HENT:      Head: Normocephalic and atraumatic.      Right Ear: Tympanic membrane, ear canal and external ear normal.      Left Ear: Tympanic membrane, ear canal and external ear normal.      Nose: Nose normal.      Mouth/Throat:      Pharynx: Uvula midline.   Eyes:      Pupils: Pupils are equal, round, and reactive to light.   Cardiovascular:      Rate and Rhythm: Normal rate and regular rhythm.      Heart sounds: Normal heart sounds. No murmur heard.    No friction rub. No gallop.   Pulmonary:      Effort: Pulmonary effort is normal.      Breath sounds: Normal breath sounds.   Abdominal:      General: Bowel sounds are normal.      Palpations: Abdomen is soft.      Tenderness: There is no abdominal tenderness.   Musculoskeletal:      Right hand: Deformity and bony tenderness present. Normal pulse.      Left hand: Deformity and bony tenderness present. Normal pulse.      Cervical back: Neck supple.   Lymphadenopathy:      Head:      Right side of head: No submental, submandibular, tonsillar, preauricular or posterior auricular adenopathy.      Left side of head: No submental, submandibular, tonsillar, preauricular or posterior auricular adenopathy.      Cervical: No cervical adenopathy.   Skin:     General: Skin is warm and dry.   Neurological:      General: No focal deficit present.      Mental Status: She is alert and " oriented to person, place, and time.   Psychiatric:         Mood and Affect: Mood normal.         Behavior: Behavior normal.                       Assessment and Plan   Diagnoses and all orders for this visit:    1. Medicare annual wellness visit, subsequent (Primary)    2. Chronic cough  -     albuterol sulfate  (90 Base) MCG/ACT inhaler; Inhale 2 puffs Every 4 (Four) Hours As Needed for Wheezing.  Dispense: 8.5 g; Refill: 1    3. Mild persistent reactive airway disease without complication  -     albuterol sulfate  (90 Base) MCG/ACT inhaler; Inhale 2 puffs Every 4 (Four) Hours As Needed for Wheezing.  Dispense: 8.5 g; Refill: 1    4. Screen for colon cancer  -     Cologuard - Stool, Per Rectum; Future    5. Supraventricular tachycardia    6. Primary hypertension    7. Acquired hypothyroidism  -     TSH    8. Joint pain in both hands  -     PREET With / DsDNA, RNP, Sjogrens A / B, Franz  -     CBC Auto Differential  -     Comprehensive Metabolic Panel  -     C-reactive Protein  -     Cyclic Citrul Peptide Antibody, IgG / IgA  -     Rheumatoid Factor  -     Sedimentation Rate  -     Vitamin D,25-Hydroxy  -     XR Hand 2 View Bilateral; Future    9. Vitamin D deficiency  -     Vitamin D,25-Hydroxy    10. Dyslipidemia  -     Lipid Panel    -- wellness visit performed, avoid prolonged fasting periods, ensure good hydration, stay active yearly eye exam  -- labs as above  -- continue f/u cards  -- asthma controlled  --Continue levothyroxine, single dose check TSH         Follow Up   No follow-ups on file.  Patient was given instructions and counseling regarding her condition or for health maintenance advice. Please see specific information pulled into the AVS if appropriate.

## 2023-08-15 LAB
25(OH)D3+25(OH)D2 SERPL-MCNC: 47.1 NG/ML (ref 30–100)
ALBUMIN SERPL-MCNC: 4.3 G/DL (ref 3.5–5.2)
ALBUMIN/GLOB SERPL: 2 G/DL
ALP SERPL-CCNC: 148 U/L (ref 39–117)
ALT SERPL-CCNC: 34 U/L (ref 1–33)
ANA SER QL: NEGATIVE
AST SERPL-CCNC: 34 U/L (ref 1–32)
BASOPHILS # BLD AUTO: 0.04 10*3/MM3 (ref 0–0.2)
BASOPHILS NFR BLD AUTO: 0.5 % (ref 0–1.5)
BILIRUB SERPL-MCNC: 0.6 MG/DL (ref 0–1.2)
BUN SERPL-MCNC: 19 MG/DL (ref 8–23)
BUN/CREAT SERPL: 24.4 (ref 7–25)
CALCIUM SERPL-MCNC: 9.2 MG/DL (ref 8.6–10.5)
CCP IGA+IGG SERPL IA-ACNC: 2 UNITS (ref 0–19)
CHLORIDE SERPL-SCNC: 105 MMOL/L (ref 98–107)
CHOLEST SERPL-MCNC: 106 MG/DL (ref 0–200)
CO2 SERPL-SCNC: 26.4 MMOL/L (ref 22–29)
CREAT SERPL-MCNC: 0.78 MG/DL (ref 0.57–1)
CRP SERPL-MCNC: <0.3 MG/DL (ref 0–0.5)
EGFRCR SERPLBLD CKD-EPI 2021: 83.9 ML/MIN/1.73
EOSINOPHIL # BLD AUTO: 0.15 10*3/MM3 (ref 0–0.4)
EOSINOPHIL NFR BLD AUTO: 1.9 % (ref 0.3–6.2)
ERYTHROCYTE [DISTWIDTH] IN BLOOD BY AUTOMATED COUNT: 13.1 % (ref 12.3–15.4)
ERYTHROCYTE [SEDIMENTATION RATE] IN BLOOD BY WESTERGREN METHOD: 2 MM/HR (ref 0–30)
GLOBULIN SER CALC-MCNC: 2.1 GM/DL
GLUCOSE SERPL-MCNC: 93 MG/DL (ref 65–99)
HCT VFR BLD AUTO: 41.7 % (ref 34–46.6)
HDLC SERPL-MCNC: 32 MG/DL (ref 40–60)
HGB BLD-MCNC: 14.5 G/DL (ref 12–15.9)
IMM GRANULOCYTES # BLD AUTO: 0.04 10*3/MM3 (ref 0–0.05)
IMM GRANULOCYTES NFR BLD AUTO: 0.5 % (ref 0–0.5)
LDLC SERPL CALC-MCNC: 39 MG/DL (ref 0–100)
LYMPHOCYTES # BLD AUTO: 2.03 10*3/MM3 (ref 0.7–3.1)
LYMPHOCYTES NFR BLD AUTO: 26.1 % (ref 19.6–45.3)
MCH RBC QN AUTO: 33 PG (ref 26.6–33)
MCHC RBC AUTO-ENTMCNC: 34.8 G/DL (ref 31.5–35.7)
MCV RBC AUTO: 95 FL (ref 79–97)
MONOCYTES # BLD AUTO: 0.59 10*3/MM3 (ref 0.1–0.9)
MONOCYTES NFR BLD AUTO: 7.6 % (ref 5–12)
NEUTROPHILS # BLD AUTO: 4.94 10*3/MM3 (ref 1.7–7)
NEUTROPHILS NFR BLD AUTO: 63.4 % (ref 42.7–76)
NRBC BLD AUTO-RTO: 0.1 /100 WBC (ref 0–0.2)
PLATELET # BLD AUTO: 187 10*3/MM3 (ref 140–450)
POTASSIUM SERPL-SCNC: 4.3 MMOL/L (ref 3.5–5.2)
PROT SERPL-MCNC: 6.4 G/DL (ref 6–8.5)
RBC # BLD AUTO: 4.39 10*6/MM3 (ref 3.77–5.28)
RHEUMATOID FACT SERPL-ACNC: <10 IU/ML
SODIUM SERPL-SCNC: 141 MMOL/L (ref 136–145)
TRIGL SERPL-MCNC: 221 MG/DL (ref 0–150)
TSH SERPL DL<=0.005 MIU/L-ACNC: 6.18 UIU/ML (ref 0.27–4.2)
VLDLC SERPL CALC-MCNC: 35 MG/DL (ref 5–40)
WBC # BLD AUTO: 7.79 10*3/MM3 (ref 3.4–10.8)

## 2023-08-16 DIAGNOSIS — E03.9 ACQUIRED HYPOTHYROIDISM: ICD-10-CM

## 2023-08-16 RX ORDER — LEVOTHYROXINE SODIUM 0.15 MG/1
150 TABLET ORAL
Qty: 30 TABLET | Refills: 2 | Status: SHIPPED | OUTPATIENT
Start: 2023-08-16

## 2023-08-16 NOTE — PROGRESS NOTES
Labs show that we need to increase your thyroid medicine.  I bumped it up to 150 mcg.  We need to repeat labs in 3 months.  Nurse visit okay.  All other labs demonstrate stability.  No sign of rheumatoid arthritis on your blood work.

## 2023-08-25 ENCOUNTER — TELEPHONE (OUTPATIENT)
Dept: FAMILY MEDICINE CLINIC | Facility: CLINIC | Age: 68
End: 2023-08-25
Payer: MEDICARE

## 2023-08-25 NOTE — TELEPHONE ENCOUNTER
Caller: Justina Ch    Relationship: Self    Best call back number: [phone]     What is the best time to reach you: ANY    Who are you requesting to speak with (clinical staff, provider,  specific staff member): CLINICAL    Do you know the name of the person who called: JUSTINA    What was the call regarding: PATIENT WOULD LIKE THE RESULTS OF HER XRAY OF HER HANDS TAKEN AT [facility]. PLEASE CALL BACK WITH RESULTS.

## 2023-08-31 ENCOUNTER — TELEPHONE (OUTPATIENT)
Dept: FAMILY MEDICINE CLINIC | Facility: CLINIC | Age: 68
End: 2023-08-31

## 2023-08-31 NOTE — TELEPHONE ENCOUNTER
PATIENT MADE CONTACT TO CHECK THE STATUS OF HER REQUEST FOR A CALL BACK WITH THE RESULTS FROM X-RAY OF HER HANDS    PLEASE CONTACT PATIENT WHEN RESULTS HAVE ARRIVED AND DIPAK JONES HAS A CHANCE TO REVIEW

## 2023-08-31 NOTE — TELEPHONE ENCOUNTER
Caller: Justina Ch    Relationship: Self    Best call back number:     [phone] (Mobile)     Caller requesting test results:     PATIENT    What test was performed:     AT HOME COLOGUARD TEST    When was the test performed:     LAST WEEK    PATIENT STATED SHE RECEIVED A POSITIVE RESULT FROM LAST WEEK'S COLOGUARD TEST    PATIENT REQUESTED A CALL BACK FROM DIPAK JONES TO DISCUSS ANY RECOMMENDATIONS FOR NEXT STEPS, REFERRAL

## 2023-09-02 DIAGNOSIS — R19.5 POSITIVE COLORECTAL CANCER SCREENING USING COLOGUARD TEST: Primary | ICD-10-CM

## 2023-09-11 ENCOUNTER — TELEPHONE (OUTPATIENT)
Dept: FAMILY MEDICINE CLINIC | Facility: CLINIC | Age: 68
End: 2023-09-11

## 2023-09-11 NOTE — TELEPHONE ENCOUNTER
PATIENT WANTING RESULTS OF HAND XRAY FROM [facility].      PLEASE CALL [phone]

## 2023-09-12 NOTE — TELEPHONE ENCOUNTER
RESULT HAS CROSSED OVER FROM [facility].    RESULT HAS BEEN PRINTED AND GIVEN TO [facility] FOR SCANNING.

## 2023-09-15 NOTE — TELEPHONE ENCOUNTER
Xray shows arthritis but not rheumatoid, just regular arthritis   (Distal joints affects, proximal spared)

## 2023-09-19 ENCOUNTER — TELEPHONE (OUTPATIENT)
Dept: GASTROENTEROLOGY | Facility: CLINIC | Age: 68
End: 2023-09-19
Payer: MEDICARE

## 2023-09-19 ENCOUNTER — OFFICE VISIT (OUTPATIENT)
Dept: GASTROENTEROLOGY | Facility: CLINIC | Age: 68
End: 2023-09-19
Payer: MEDICARE

## 2023-09-19 VITALS
DIASTOLIC BLOOD PRESSURE: 78 MMHG | BODY MASS INDEX: 32.46 KG/M2 | HEART RATE: 75 BPM | OXYGEN SATURATION: 98 % | WEIGHT: 201 LBS | SYSTOLIC BLOOD PRESSURE: 140 MMHG

## 2023-09-19 DIAGNOSIS — R19.5 POSITIVE COLORECTAL CANCER SCREENING USING COLOGUARD TEST: Primary | ICD-10-CM

## 2023-09-19 RX ORDER — POLYETHYLENE GLYCOL 3350, SODIUM SULFATE, SODIUM CHLORIDE, POTASSIUM CHLORIDE, ASCORBIC ACID, SODIUM ASCORBATE 140-9-5.2G
1 KIT ORAL ONCE
Qty: 1 EACH | Refills: 0 | Status: SHIPPED | OUTPATIENT
Start: 2023-09-19 | End: 2023-09-19

## 2023-09-19 NOTE — TELEPHONE ENCOUNTER
Patient was seen in office today by Dr. Pam Lema, Gastroenterology. Colonoscopy has been scheduled for 10/10/23 for further evaluation of positive cologuard. Requesting clearance for patient to hold Aspirin for 7 days prior to procedure or let us know if you have any other recommendations.    Thank you,    EMILY Thompson

## 2023-09-19 NOTE — PROGRESS NOTES
New Patient Consult      Date: 2023   Patient Name: Justina Ch  MRN: [id number]  : 1955     Referring Physician: Sonali Maldonado APRN    Chief Complaint   Patient presents with    Positive Cologuard       History of Present Illness: Justina Ch is a 68 y.o. female who is here today to establish care with Gastroenterology.    She had a colonoscopy approximately 9 years ago which was unremarkable.  Approximately 4 years ago she had a Cologuard which was negative and just recently had a follow-up Cologuard which is positive.  Denies lower GI symptoms.  No blood per rectum.    She will occasionally deal with constipation which is usually diet related.  Responds to over-the-counter laxatives.    Subjective      Past Medical History:   Diagnosis Date    Abnormal cardiovascular stress test 2016    Allergic     Arrhythmia     Cancer     Closed fracture of left fibula     COVID-19 2021    Dizziness 2021    Enlarged heart     Graves disease 2016    status post ablation with hypothyroidism.      Graves' disease     History of COVID-19 2021    Hx of mammogram 2016    Hx of staphylococcal infection     Hyperlipidemia     Hypertension 2016    Pre-syncope 2016    Snoring 2016       Past Surgical History:   Procedure Laterality Date    ABLATION OF DYSRHYTHMIC FOCUS      CARDIAC CATHETERIZATION      his ablation    CATARACT EXTRACTION, BILATERAL      COLONOSCOPY  2013    DILATATION AND CURETTAGE      of cervical stump    LASIK  2020    left eye     OTHER SURGICAL HISTORY      facial surgery       Family History   Problem Relation Age of Onset    Hypertension Mother     Breast cancer Mother     Bone cancer Mother     Cancer Mother     Heart attack Father     Heart disease Father         pacemaker    Hyperlipidemia Father     Hypertension Father     Diabetes Father         Passed away    Breast cancer Sister     Ovarian cancer  Sister     Cancer Sister         sister passed away    Diabetes Paternal Grandmother        Social History     Socioeconomic History    Marital status:    Tobacco Use    Smoking status: Former     Packs/day: 0.50     Years: 25.00     Pack years: 12.50     Types: Cigarettes     Start date: 1972     Quit date: 2001     Years since quittin.3     Passive exposure: Never    Smokeless tobacco: Never   Vaping Use    Vaping Use: Never used   Substance and Sexual Activity    Alcohol use: No    Drug use: No    Sexual activity: Not Currently         Current Outpatient Medications:     aspirin 81 MG tablet, Take 1 tablet by mouth Daily., Disp: , Rfl:     atenolol (TENORMIN) 25 MG tablet, Take 0.5 tablets by mouth Daily., Disp: 45 tablet, Rfl: 3    atorvastatin (LIPITOR) 40 MG tablet, Take 1 tablet by mouth Daily., Disp: 90 tablet, Rfl: 3    cetirizine (zyrTEC) 10 MG tablet, TAKE 1 TABLET BY MOUTH EVERY DAY, Disp: 90 tablet, Rfl: 3    flecainide (TAMBOCOR) 100 MG tablet, Take 1 tablet by mouth 2 (Two) Times a Day., Disp: 180 tablet, Rfl: 3    levothyroxine (SYNTHROID, LEVOTHROID) 150 MCG tablet, Take 1 tablet by mouth Every Morning., Disp: 30 tablet, Rfl: 2    meloxicam (MOBIC) 15 MG tablet, Take 1 tablet by mouth Daily., Disp: , Rfl:     nitroglycerin (NITROSTAT) 0.4 MG SL tablet, 1 under the tongue as needed for angina, may repeat q5mins for up three doses, Disp: 30 tablet, Rfl: 3    albuterol sulfate  (90 Base) MCG/ACT inhaler, Inhale 2 puffs Every 4 (Four) Hours As Needed for Wheezing. (Patient not taking: Reported on 2023), Disp: 8.5 g, Rfl: 1    Fluticasone Furoate-Vilanterol (Breo Ellipta) 200-25 MCG/ACT inhaler, Inhale 1 puff Daily. (Patient not taking: Reported on 2023), Disp: 60 each, Rfl: 5    Allergies   Allergen Reactions    Latex Rash       Review of Systems   Constitutional:  Negative for unexpected weight loss.   HENT:  Negative for trouble swallowing.    Gastrointestinal:   Positive for GERD and indigestion. Negative for abdominal distention, abdominal pain, anal bleeding, blood in stool, constipation, diarrhea, nausea, rectal pain and vomiting.     The following portions of the patient's history were reviewed and updated as appropriate: allergies, current medications, past family history, past medical history, past social history, past surgical history and problem list.    Objective     Physical Exam:  Vitals:    09/19/23 1402   BP: 140/78   Pulse: 75   SpO2: 98%   Weight: 91.2 kg (201 lb)       Physical Exam  Constitutional:       General: She is not in acute distress.     Appearance: Normal appearance. She is not ill-appearing.   HENT:      Head: Normocephalic and atraumatic.      Mouth/Throat:      Mouth: Mucous membranes are moist.   Eyes:      General: No scleral icterus.     Conjunctiva/sclera: Conjunctivae normal.   Cardiovascular:      Rate and Rhythm: Normal rate.      Heart sounds: Normal heart sounds.   Pulmonary:      Effort: Pulmonary effort is normal. No respiratory distress.      Breath sounds: Normal breath sounds.   Abdominal:      General: There is no distension.      Palpations: Abdomen is soft.      Tenderness: There is no abdominal tenderness. There is no guarding.   Musculoskeletal:         General: No swelling or tenderness.      Cervical back: Neck supple. No rigidity.   Skin:     General: Skin is warm and dry.      Capillary Refill: Capillary refill takes less than 2 seconds.      Coloration: Skin is not jaundiced or pale.   Neurological:      General: No focal deficit present.      Mental Status: She is alert and oriented to person, place, and time. Mental status is at baseline.   Psychiatric:         Mood and Affect: Mood normal.         Behavior: Behavior normal.       Results Review:   I have reviewed the patient's new clinical and imaging results and agree with the interpretation.     Results Encounter on 08/14/2023   Component Date Value Ref Range Status     Cologuard 08/22/2023 Positive (A)  Negative Final    Comment:   POSITIVE TEST RESULT. A positive Cologuard result should be followed with a colonoscopy or visual examination of the colon. The normal value (reference range) for this assay is negative.    TEST DESCRIPTION: Composite algorithmic analysis of stool DNA-biomarkers with hemoglobin immunoassay.   Quantitative values of individual biomarkers are not reportable and are not associated with individual biomarker result reference ranges. Cologuard is intended for colorectal cancer screening of adults of either sex, 45 years or older, who are at average-risk for colorectal cancer (CRC). Cologuard has been approved for use by the U.S. FDA. The performance of Cologuard was established in a cross sectional study of average-risk adults aged 50-84. Cologuard performance in patients ages 45 to 49 years was estimated by sub-group analysis of near-age groups. Colonoscopies performed for a positive result may find as the most clinically significant lesion: colorectal cancer [4.0%], advanced adenoma                            (including sessile serrated polyps greater than or equal to 1cm diameter) [20%] or non- advanced adenoma [31%]; or no colorectal neoplasia [45%]. These estimates are derived from a prospective cross-sectional screening study of 10,000 individuals at average risk for colorectal cancer who were screened with both Cologuard and colonoscopy. (Nicanor Barreto al, N Engl J Med 2014;370(14):2391-7717.) Cologuard may produce a false negative or false positive result (no colorectal cancer or precancerous polyp present at colonoscopy follow up). A negative Cologuard test result does not guarantee the absence of CRC or advanced adenoma (pre-cancer). The current Cologuard screening interval is every 3 years. (American Cancer Society and U.S. Multi-Society Task Force). Cologuard performance data in a 10,000 patient pivotal study using colonoscopy as the  reference method can be accessed at the following location: [email or web address]. Additional description of the Cologuard test process,                            warnings and precautions can be found at [email or web address].     Office Visit on 08/14/2023   Component Date Value Ref Range Status    PREET Direct 08/14/2023 Negative  Negative Final    Glucose 08/14/2023 93  65 - 99 mg/dL Final    BUN 08/14/2023 19  8 - 23 mg/dL Final    Creatinine 08/14/2023 0.78  0.57 - 1.00 mg/dL Final    EGFR Result 08/14/2023 83.9  >60.0 mL/min/1.73 Final    Comment: GFR Normal >60  Chronic Kidney Disease <60  Kidney Failure <15      BUN/Creatinine Ratio 08/14/2023 24.4  7.0 - 25.0 Final    Sodium 08/14/2023 141  136 - 145 mmol/L Final    Potassium 08/14/2023 4.3  3.5 - 5.2 mmol/L Final    Chloride 08/14/2023 105  98 - 107 mmol/L Final    Total CO2 08/14/2023 26.4  22.0 - 29.0 mmol/L Final    Calcium 08/14/2023 9.2  8.6 - 10.5 mg/dL Final    Total Protein 08/14/2023 6.4  6.0 - 8.5 g/dL Final    Albumin 08/14/2023 4.3  3.5 - 5.2 g/dL Final    Globulin 08/14/2023 2.1  gm/dL Final    A/G Ratio 08/14/2023 2.0  g/dL Final    Total Bilirubin 08/14/2023 0.6  0.0 - 1.2 mg/dL Final    Alkaline Phosphatase 08/14/2023 148 (H)  39 - 117 U/L Final    AST (SGOT) 08/14/2023 34 (H)  1 - 32 U/L Final    ALT (SGPT) 08/14/2023 34 (H)  1 - 33 U/L Final    C-Reactive Protein 08/14/2023 <0.30  0.00 - 0.50 mg/dL Final    CCP Antibodies IgG/IgA 08/14/2023 2  0 - 19 units Final    Comment:                           Negative               <20                            Weak positive      20 - 39                            Moderate positive  40 - 59                            Strong positive        >59      RA Latex Turbid 08/14/2023 <10.0  <14.0 IU/mL Final    Sed Rate 08/14/2023 2  0 - 30 mm/hr Final    25 Hydroxy, Vitamin D 08/14/2023 47.1  30.0 - 100.0 ng/mL Final    Comment: Vitamin D deficiency has been defined by the [place] of  Medicine and an  Endocrine Society practice guideline as a  level of serum 25-OH vitamin D less than 20 ng/mL (1,2).  The Endocrine Society went on to further define vitamin D  insufficiency as a level between 21 and 29 ng/mL (2).  1. IOM ([place] of [employer]). 2010. Dietary reference     intakes for calcium and D. [place]: The     National [employer].  2. Tanya MF, Myrna THORPE, Mala AMEZQUITA, et al.     Evaluation, treatment, and prevention of vitamin D     deficiency: an Endocrine Society clinical practice     guideline. JCEM. 2011 Jul; 96(7):1911-30.      TSH 08/14/2023 6.180 (H)  0.270 - 4.200 uIU/mL Final    Total Cholesterol 08/14/2023 106  0 - 200 mg/dL Final    Comment: Cholesterol Reference Ranges  (U.S. Department of Health and Human Services ATP III  Classifications)  Desirable          <200 mg/dL  Borderline High    200-239 mg/dL  High Risk          >240 mg/dL  Triglyceride Reference Ranges  (U.S. Department of Health and Human Services ATP III  Classifications)  Normal           <150 mg/dL  Borderline High  150-199 mg/dL  High             200-499 mg/dL  Very High        >500 mg/dL  HDL Reference Ranges  (U.S. Department of Health and Human Services ATP III  Classifications)  Low     <40 mg/dl (major risk factor for CHD)  High    >60 mg/dl ('negative' risk factor for CHD)  LDL Reference Ranges  (U.S. Department of Health and Human Services ATP III  Classifications)  Optimal          <100 mg/dL  Near Optimal     100-129 mg/dL  Borderline High  130-159 mg/dL  High             160-189 mg/dL  Very High        >189 mg/dL      Triglycerides 08/14/2023 221 (H)  0 - 150 mg/dL Final    HDL Cholesterol 08/14/2023 32 (L)  40 - 60 mg/dL Final    VLDL Cholesterol Sean 08/14/2023 35  5 - 40 mg/dL Final    LDL Chol Calc (NIH) 08/14/2023 39  0 - 100 mg/dL Final    WBC 08/14/2023 7.79  3.40 - 10.80 10*3/mm3 Final    RBC 08/14/2023 4.39  3.77 - 5.28 10*6/mm3 Final    Hemoglobin 08/14/2023 14.5  12.0 - 15.9 g/dL Final     Hematocrit 08/14/2023 41.7  34.0 - 46.6 % Final    MCV 08/14/2023 95.0  79.0 - 97.0 fL Final    MCH 08/14/2023 33.0  26.6 - 33.0 pg Final    MCHC 08/14/2023 34.8  31.5 - 35.7 g/dL Final    RDW 08/14/2023 13.1  12.3 - 15.4 % Final    Platelets 08/14/2023 187  140 - 450 10*3/mm3 Final    Neutrophil Rel % 08/14/2023 63.4  42.7 - 76.0 % Final    Lymphocyte Rel % 08/14/2023 26.1  19.6 - 45.3 % Final    Monocyte Rel % 08/14/2023 7.6  5.0 - 12.0 % Final    Eosinophil Rel % 08/14/2023 1.9  0.3 - 6.2 % Final    Basophil Rel % 08/14/2023 0.5  0.0 - 1.5 % Final    Neutrophils Absolute 08/14/2023 4.94  1.70 - 7.00 10*3/mm3 Final    Lymphocytes Absolute 08/14/2023 2.03  0.70 - 3.10 10*3/mm3 Final    Monocytes Absolute 08/14/2023 0.59  0.10 - 0.90 10*3/mm3 Final    Eosinophils Absolute 08/14/2023 0.15  0.00 - 0.40 10*3/mm3 Final    Basophils Absolute 08/14/2023 0.04  0.00 - 0.20 10*3/mm3 Final    Immature Granulocyte Rel % 08/14/2023 0.5  0.0 - 0.5 % Final    Immature Grans Absolute 08/14/2023 0.04  0.00 - 0.05 10*3/mm3 Final    nRBC 08/14/2023 0.1  0.0 - 0.2 /100 WBC Final      XR HAND BILATERAL 2VIEW    Result Date: 8/15/2023  Degenerative change without evidence of acute process. Films reviewed, interpreted, and dictated by Dr. Ponce. Transcribed by Cristina Bagley PA-C.    DXA bone density spine and hip    Result Date: 8/8/2023  FINAL IMPRESSION:  Normal bone mineral density in the lumbar spine and left hip RECOMMENDATIONS: Follow up examination in 2 years     Assessment / Plan      Assessment & Plan:  Diagnoses and all orders for this visit:    1. Positive colorectal cancer screening using Cologuard test (Primary)  -     Case Request; Standing  -     Case Request    Other orders  -     Follow Anesthesia Guidelines / Protocol; Standing  -     Follow Anesthesia Guidelines / Protocol; Future  -     Obtain Informed Consent; Future  -     Verify NPO; Standing      Plan for colonoscopy with monitored anesthesia care. Counseled  in detail regarding the risks, benefits and alternatives of the procedure, including but not limited to perforation, bleeding, infection, post-operative pain, complications from anesthesia, aspiration, cardiac decompensation, need for further procedures or surgery, which may occur in approximately 1 in 1000 procedures. Counseled also that endoscopy and colonoscopy are not perfect tests, and there is a possibility of missed diagnoses, including but not limited to polyps or cancer. Counseled regarding pre procedural instructions. Also discussed bowel preparation. Counseled regarding potential, albeit rare complications with bowel preparation, including but not limited to renal insufficiency, electrolyte abnormalities, which may lead to other complications. Written instructions provided. Instructed to arrange for a ride home for the day of procedure. Patient voiced understanding of the above, and agrees to proceed.      Follow Up:   No follow-ups on file.    Pam Lema MD  [facility]    9/19/2023  14:12 EDT    Part of this note may be an electronic transcription/translation of spoken language to printed text using the Dragon Dictation System.

## 2023-09-19 NOTE — H&P (VIEW-ONLY)
New Patient Consult      Date: 2023   Patient Name: Justina Ch  MRN: [id number]  : 1955     Referring Physician: Sonali Maldonado APRN    Chief Complaint   Patient presents with    Positive Cologuard       History of Present Illness: Justina Ch is a 68 y.o. female who is here today to establish care with Gastroenterology.    She had a colonoscopy approximately 9 years ago which was unremarkable.  Approximately 4 years ago she had a Cologuard which was negative and just recently had a follow-up Cologuard which is positive.  Denies lower GI symptoms.  No blood per rectum.    She will occasionally deal with constipation which is usually diet related.  Responds to over-the-counter laxatives.    Subjective      Past Medical History:   Diagnosis Date    Abnormal cardiovascular stress test 2016    Allergic     Arrhythmia     Cancer     Closed fracture of left fibula     COVID-19 2021    Dizziness 2021    Enlarged heart     Graves disease 2016    status post ablation with hypothyroidism.      Graves' disease     History of COVID-19 2021    Hx of mammogram 2016    Hx of staphylococcal infection     Hyperlipidemia     Hypertension 2016    Pre-syncope 2016    Snoring 2016       Past Surgical History:   Procedure Laterality Date    ABLATION OF DYSRHYTHMIC FOCUS      CARDIAC CATHETERIZATION      his ablation    CATARACT EXTRACTION, BILATERAL      COLONOSCOPY  2013    DILATATION AND CURETTAGE      of cervical stump    LASIK  2020    left eye     OTHER SURGICAL HISTORY      facial surgery       Family History   Problem Relation Age of Onset    Hypertension Mother     Breast cancer Mother     Bone cancer Mother     Cancer Mother     Heart attack Father     Heart disease Father         pacemaker    Hyperlipidemia Father     Hypertension Father     Diabetes Father         Passed away    Breast cancer Sister     Ovarian cancer  Sister     Cancer Sister         sister passed away    Diabetes Paternal Grandmother        Social History     Socioeconomic History    Marital status:    Tobacco Use    Smoking status: Former     Packs/day: 0.50     Years: 25.00     Pack years: 12.50     Types: Cigarettes     Start date: 1972     Quit date: 2001     Years since quittin.3     Passive exposure: Never    Smokeless tobacco: Never   Vaping Use    Vaping Use: Never used   Substance and Sexual Activity    Alcohol use: No    Drug use: No    Sexual activity: Not Currently         Current Outpatient Medications:     aspirin 81 MG tablet, Take 1 tablet by mouth Daily., Disp: , Rfl:     atenolol (TENORMIN) 25 MG tablet, Take 0.5 tablets by mouth Daily., Disp: 45 tablet, Rfl: 3    atorvastatin (LIPITOR) 40 MG tablet, Take 1 tablet by mouth Daily., Disp: 90 tablet, Rfl: 3    cetirizine (zyrTEC) 10 MG tablet, TAKE 1 TABLET BY MOUTH EVERY DAY, Disp: 90 tablet, Rfl: 3    flecainide (TAMBOCOR) 100 MG tablet, Take 1 tablet by mouth 2 (Two) Times a Day., Disp: 180 tablet, Rfl: 3    levothyroxine (SYNTHROID, LEVOTHROID) 150 MCG tablet, Take 1 tablet by mouth Every Morning., Disp: 30 tablet, Rfl: 2    meloxicam (MOBIC) 15 MG tablet, Take 1 tablet by mouth Daily., Disp: , Rfl:     nitroglycerin (NITROSTAT) 0.4 MG SL tablet, 1 under the tongue as needed for angina, may repeat q5mins for up three doses, Disp: 30 tablet, Rfl: 3    albuterol sulfate  (90 Base) MCG/ACT inhaler, Inhale 2 puffs Every 4 (Four) Hours As Needed for Wheezing. (Patient not taking: Reported on 2023), Disp: 8.5 g, Rfl: 1    Fluticasone Furoate-Vilanterol (Breo Ellipta) 200-25 MCG/ACT inhaler, Inhale 1 puff Daily. (Patient not taking: Reported on 2023), Disp: 60 each, Rfl: 5    Allergies   Allergen Reactions    Latex Rash       Review of Systems   Constitutional:  Negative for unexpected weight loss.   HENT:  Negative for trouble swallowing.    Gastrointestinal:   Positive for GERD and indigestion. Negative for abdominal distention, abdominal pain, anal bleeding, blood in stool, constipation, diarrhea, nausea, rectal pain and vomiting.     The following portions of the patient's history were reviewed and updated as appropriate: allergies, current medications, past family history, past medical history, past social history, past surgical history and problem list.    Objective     Physical Exam:  Vitals:    09/19/23 1402   BP: 140/78   Pulse: 75   SpO2: 98%   Weight: 91.2 kg (201 lb)       Physical Exam  Constitutional:       General: She is not in acute distress.     Appearance: Normal appearance. She is not ill-appearing.   HENT:      Head: Normocephalic and atraumatic.      Mouth/Throat:      Mouth: Mucous membranes are moist.   Eyes:      General: No scleral icterus.     Conjunctiva/sclera: Conjunctivae normal.   Cardiovascular:      Rate and Rhythm: Normal rate.      Heart sounds: Normal heart sounds.   Pulmonary:      Effort: Pulmonary effort is normal. No respiratory distress.      Breath sounds: Normal breath sounds.   Abdominal:      General: There is no distension.      Palpations: Abdomen is soft.      Tenderness: There is no abdominal tenderness. There is no guarding.   Musculoskeletal:         General: No swelling or tenderness.      Cervical back: Neck supple. No rigidity.   Skin:     General: Skin is warm and dry.      Capillary Refill: Capillary refill takes less than 2 seconds.      Coloration: Skin is not jaundiced or pale.   Neurological:      General: No focal deficit present.      Mental Status: She is alert and oriented to person, place, and time. Mental status is at baseline.   Psychiatric:         Mood and Affect: Mood normal.         Behavior: Behavior normal.       Results Review:   I have reviewed the patient's new clinical and imaging results and agree with the interpretation.     Results Encounter on 08/14/2023   Component Date Value Ref Range Status     Cologuard 08/22/2023 Positive (A)  Negative Final    Comment:   POSITIVE TEST RESULT. A positive Cologuard result should be followed with a colonoscopy or visual examination of the colon. The normal value (reference range) for this assay is negative.    TEST DESCRIPTION: Composite algorithmic analysis of stool DNA-biomarkers with hemoglobin immunoassay.   Quantitative values of individual biomarkers are not reportable and are not associated with individual biomarker result reference ranges. Cologuard is intended for colorectal cancer screening of adults of either sex, 45 years or older, who are at average-risk for colorectal cancer (CRC). Cologuard has been approved for use by the U.S. FDA. The performance of Cologuard was established in a cross sectional study of average-risk adults aged 50-84. Cologuard performance in patients ages 45 to 49 years was estimated by sub-group analysis of near-age groups. Colonoscopies performed for a positive result may find as the most clinically significant lesion: colorectal cancer [4.0%], advanced adenoma                            (including sessile serrated polyps greater than or equal to 1cm diameter) [20%] or non- advanced adenoma [31%]; or no colorectal neoplasia [45%]. These estimates are derived from a prospective cross-sectional screening study of 10,000 individuals at average risk for colorectal cancer who were screened with both Cologuard and colonoscopy. (Nicanor Barreto al, N Engl J Med 2014;370(14):9032-5969.) Cologuard may produce a false negative or false positive result (no colorectal cancer or precancerous polyp present at colonoscopy follow up). A negative Cologuard test result does not guarantee the absence of CRC or advanced adenoma (pre-cancer). The current Cologuard screening interval is every 3 years. (American Cancer Society and U.S. Multi-Society Task Force). Cologuard performance data in a 10,000 patient pivotal study using colonoscopy as the  reference method can be accessed at the following location: www.Invup/results. Additional description of the Cologuard test process,                            warnings and precautions can be found at [email or web address].[email or web address].     Office Visit on 08/14/2023   Component Date Value Ref Range Status    PREET Direct 08/14/2023 Negative  Negative Final    Glucose 08/14/2023 93  65 - 99 mg/dL Final    BUN 08/14/2023 19  8 - 23 mg/dL Final    Creatinine 08/14/2023 0.78  0.57 - 1.00 mg/dL Final    EGFR Result 08/14/2023 83.9  >60.0 mL/min/1.73 Final    Comment: GFR Normal >60  Chronic Kidney Disease <60  Kidney Failure <15      BUN/Creatinine Ratio 08/14/2023 24.4  7.0 - 25.0 Final    Sodium 08/14/2023 141  136 - 145 mmol/L Final    Potassium 08/14/2023 4.3  3.5 - 5.2 mmol/L Final    Chloride 08/14/2023 105  98 - 107 mmol/L Final    Total CO2 08/14/2023 26.4  22.0 - 29.0 mmol/L Final    Calcium 08/14/2023 9.2  8.6 - 10.5 mg/dL Final    Total Protein 08/14/2023 6.4  6.0 - 8.5 g/dL Final    Albumin 08/14/2023 4.3  3.5 - 5.2 g/dL Final    Globulin 08/14/2023 2.1  gm/dL Final    A/G Ratio 08/14/2023 2.0  g/dL Final    Total Bilirubin 08/14/2023 0.6  0.0 - 1.2 mg/dL Final    Alkaline Phosphatase 08/14/2023 148 (H)  39 - 117 U/L Final    AST (SGOT) 08/14/2023 34 (H)  1 - 32 U/L Final    ALT (SGPT) 08/14/2023 34 (H)  1 - 33 U/L Final    C-Reactive Protein 08/14/2023 <0.30  0.00 - 0.50 mg/dL Final    CCP Antibodies IgG/IgA 08/14/2023 2  0 - 19 units Final    Comment:                           Negative               <20                            Weak positive      20 - 39                            Moderate positive  40 - 59                            Strong positive        >59      RA Latex Turbid 08/14/2023 <10.0  <14.0 IU/mL Final    Sed Rate 08/14/2023 2  0 - 30 mm/hr Final    25 Hydroxy, Vitamin D 08/14/2023 47.1  30.0 - 100.0 ng/mL Final    Comment: Vitamin D deficiency has been defined by the [place] of  Medicine and an  Endocrine Society practice guideline as a  level of serum 25-OH vitamin D less than 20 ng/mL (1,2).  The Endocrine Society went on to further define vitamin D  insufficiency as a level between 21 and 29 ng/mL (2).  1. IOM ([place] of Medicine). 2010. Dietary reference     intakes for calcium and D. [place]: The     National [employer]American [employer].  2. Tanya MF, Myrna THORPE, Mala AMEZQUITA, et al.     Evaluation, treatment, and prevention of vitamin D     deficiency: an Endocrine Society clinical practice     guideline. JCEM. 2011 Jul; 96(7):1911-30.      TSH 08/14/2023 6.180 (H)  0.270 - 4.200 uIU/mL Final    Total Cholesterol 08/14/2023 106  0 - 200 mg/dL Final    Comment: Cholesterol Reference Ranges  (U.S. Department of Health and Human Services ATP III  Classifications)  Desirable          <200 mg/dL  Borderline High    200-239 mg/dL  High Risk          >240 mg/dL  Triglyceride Reference Ranges  (U.S. Department of Health and Human Services ATP III  Classifications)  Normal           <150 mg/dL  Borderline High  150-199 mg/dL  High             200-499 mg/dL  Very High        >500 mg/dL  HDL Reference Ranges  (U.S. Department of Health and Human Services ATP III  Classifications)  Low     <40 mg/dl (major risk factor for CHD)  High    >60 mg/dl ('negative' risk factor for CHD)  LDL Reference Ranges  (U.S. Department of Health and Human Services ATP III  Classifications)  Optimal          <100 mg/dL  Near Optimal     100-129 mg/dL  Borderline High  130-159 mg/dL  High             160-189 mg/dL  Very High        >189 mg/dL      Triglycerides 08/14/2023 221 (H)  0 - 150 mg/dL Final    HDL Cholesterol 08/14/2023 32 (L)  40 - 60 mg/dL Final    VLDL Cholesterol Sean 08/14/2023 35  5 - 40 mg/dL Final    LDL Chol Calc (NIH) 08/14/2023 39  0 - 100 mg/dL Final    WBC 08/14/2023 7.79  3.40 - 10.80 10*3/mm3 Final    RBC 08/14/2023 4.39  3.77 - 5.28 10*6/mm3 Final    Hemoglobin 08/14/2023 14.5  12.0 - 15.9 g/dL Final     Hematocrit 08/14/2023 41.7  34.0 - 46.6 % Final    MCV 08/14/2023 95.0  79.0 - 97.0 fL Final    MCH 08/14/2023 33.0  26.6 - 33.0 pg Final    MCHC 08/14/2023 34.8  31.5 - 35.7 g/dL Final    RDW 08/14/2023 13.1  12.3 - 15.4 % Final    Platelets 08/14/2023 187  140 - 450 10*3/mm3 Final    Neutrophil Rel % 08/14/2023 63.4  42.7 - 76.0 % Final    Lymphocyte Rel % 08/14/2023 26.1  19.6 - 45.3 % Final    Monocyte Rel % 08/14/2023 7.6  5.0 - 12.0 % Final    Eosinophil Rel % 08/14/2023 1.9  0.3 - 6.2 % Final    Basophil Rel % 08/14/2023 0.5  0.0 - 1.5 % Final    Neutrophils Absolute 08/14/2023 4.94  1.70 - 7.00 10*3/mm3 Final    Lymphocytes Absolute 08/14/2023 2.03  0.70 - 3.10 10*3/mm3 Final    Monocytes Absolute 08/14/2023 0.59  0.10 - 0.90 10*3/mm3 Final    Eosinophils Absolute 08/14/2023 0.15  0.00 - 0.40 10*3/mm3 Final    Basophils Absolute 08/14/2023 0.04  0.00 - 0.20 10*3/mm3 Final    Immature Granulocyte Rel % 08/14/2023 0.5  0.0 - 0.5 % Final    Immature Grans Absolute 08/14/2023 0.04  0.00 - 0.05 10*3/mm3 Final    nRBC 08/14/2023 0.1  0.0 - 0.2 /100 WBC Final      XR HAND BILATERAL 2VIEW    Result Date: 8/15/2023  Degenerative change without evidence of acute process. Films reviewed, interpreted, and dictated by Dr. Ponce. Transcribed by Cristina Bagley PA-C.    DXA bone density spine and hip    Result Date: 8/8/2023  FINAL IMPRESSION:  Normal bone mineral density in the lumbar spine and left hip RECOMMENDATIONS: Follow up examination in 2 years     Assessment / Plan      Assessment & Plan:  Diagnoses and all orders for this visit:    1. Positive colorectal cancer screening using Cologuard test (Primary)  -     Case Request; Standing  -     Case Request    Other orders  -     Follow Anesthesia Guidelines / Protocol; Standing  -     Follow Anesthesia Guidelines / Protocol; Future  -     Obtain Informed Consent; Future  -     Verify NPO; Standing      Plan for colonoscopy with monitored anesthesia care. Counseled  in detail regarding the risks, benefits and alternatives of the procedure, including but not limited to perforation, bleeding, infection, post-operative pain, complications from anesthesia, aspiration, cardiac decompensation, need for further procedures or surgery, which may occur in approximately 1 in 1000 procedures. Counseled also that endoscopy and colonoscopy are not perfect tests, and there is a possibility of missed diagnoses, including but not limited to polyps or cancer. Counseled regarding pre procedural instructions. Also discussed bowel preparation. Counseled regarding potential, albeit rare complications with bowel preparation, including but not limited to renal insufficiency, electrolyte abnormalities, which may lead to other complications. Written instructions provided. Instructed to arrange for a ride home for the day of procedure. Patient voiced understanding of the above, and agrees to proceed.      Follow Up:   No follow-ups on file.    Pam Lema MD  [facility] [place]    9/19/2023  14:12 EDT    Part of this note may be an electronic transcription/translation of spoken language to printed text using the Dragon Dictation System.

## 2023-09-22 PROBLEM — R19.5 POSITIVE COLORECTAL CANCER SCREENING USING COLOGUARD TEST: Status: ACTIVE | Noted: 2023-09-22

## 2023-10-03 NOTE — PRE-PROCEDURE INSTRUCTIONS
TOO phone history completed with pt for upcoming procedure on  10/10/23 with Dr. Pita GAGE PASS GIVEN/REVIEWED WITH PT.  VERBALIZED UNDERSTANDING OF THE FOLLOWING:  DO NOT EAT, DRINK, SMOKE, USE SMOKELESS TOBACCO OR CHEW GUM AFTER MIDNIGHT THE NIGHT BEFORE SURGERY.  THIS ALSO INCLUDES HARD CANDIES AND MINTS.    DO NOT SHAVE THE AREA TO BE OPERATED ON AT LEAST 48 HOURS PRIOR TO THE PROCEDURE.  DO NOT WEAR MAKE UP OR NAIL POLISH.  DO NOT LEAVE IN ANY PIERCING OR WEAR JEWELRY THE DAY OF SURGERY.      DO NOT USE ADHESIVES IF YOU WEAR DENTURES.    DO NOT WEAR EYE CONTACTS; BRING IN YOUR GLASSES.    ONLY TAKE MEDICATION THE MORNING OF YOUR PROCEDURE IF INSTRUCTED BY YOUR SURGEON WITH ENOUGH WATER TO SWALLOW THE MEDICATION.  IF YOUR SURGEON DID NOT SPECIFY WHICH MEDICATIONS TO TAKE, YOU WILL NEED TO CALL THEIR OFFICE FOR FURTHER INSTRUCTIONS AND DO AS THEY INSTRUCT.    LEAVE ANYTHING YOU CONSIDER VALUABLE AT HOME.    YOU WILL NEED TO ARRANGE FOR SOMEONE TO DRIVE YOU HOME AFTER SURGERY.  IT IS RECOMMENDED THAT YOU DO NOT DRIVE, WORK, DRINK ALCOHOL OR MAKE MAJOR DECISIONS FOR AT LEAST 24 HOURS AFTER YOUR PROCEDURE IS COMPLETE.      THE DAY OF YOUR PROCEDURE, BRING IN THE FOLLOWING IF APPLICABLE:   PICTURE ID AND INSURANCE/MEDICARE OR MEDICAID CARDS/ANY CO-PAY THAT MAY BE DUE   COPY OF ADVANCED DIRECTIVE/LIVING WILL/POWER OR    CPAP/BIPAP/INHALERS   SKIN PREP SHEET   YOUR PREADMISSION TESTING PASS (IF NOT A PHONE HISTORY)    Medication instructions given to pt by RN per anesthesia protocol.  Pt referred back to surgeon for further instructions if he/she is on any blood thinners.

## 2023-10-10 ENCOUNTER — ANESTHESIA (OUTPATIENT)
Dept: GASTROENTEROLOGY | Facility: HOSPITAL | Age: 68
End: 2023-10-10
Payer: MEDICARE

## 2023-10-10 ENCOUNTER — HOSPITAL ENCOUNTER (OUTPATIENT)
Facility: HOSPITAL | Age: 68
Setting detail: HOSPITAL OUTPATIENT SURGERY
Discharge: HOME | End: 2023-10-10
Attending: INTERNAL MEDICINE | Admitting: INTERNAL MEDICINE
Payer: MEDICARE

## 2023-10-10 ENCOUNTER — ANESTHESIA EVENT (OUTPATIENT)
Dept: GASTROENTEROLOGY | Facility: HOSPITAL | Age: 68
End: 2023-10-10
Payer: MEDICARE

## 2023-10-10 VITALS
TEMPERATURE: 97.3 F | RESPIRATION RATE: 18 BRPM | WEIGHT: 193 LBS | OXYGEN SATURATION: 94 % | HEART RATE: 55 BPM | SYSTOLIC BLOOD PRESSURE: 124 MMHG | DIASTOLIC BLOOD PRESSURE: 64 MMHG | HEIGHT: 65 IN | BODY MASS INDEX: 32.15 KG/M2

## 2023-10-10 DIAGNOSIS — R19.5 POSITIVE COLORECTAL CANCER SCREENING USING COLOGUARD TEST: ICD-10-CM

## 2023-10-10 PROCEDURE — 25810000003 LACTATED RINGERS PER 1000 ML: Performed by: INTERNAL MEDICINE

## 2023-10-10 PROCEDURE — 25010000002 PROPOFOL 10 MG/ML EMULSION: Performed by: NURSE ANESTHETIST, CERTIFIED REGISTERED

## 2023-10-10 RX ORDER — LIDOCAINE HYDROCHLORIDE 20 MG/ML
INJECTION, SOLUTION INTRAVENOUS AS NEEDED
Status: DISCONTINUED | OUTPATIENT
Start: 2023-10-10 | End: 2023-10-10 | Stop reason: SURG

## 2023-10-10 RX ORDER — SODIUM CHLORIDE, SODIUM LACTATE, POTASSIUM CHLORIDE, CALCIUM CHLORIDE 600; 310; 30; 20 MG/100ML; MG/100ML; MG/100ML; MG/100ML
1000 INJECTION, SOLUTION INTRAVENOUS CONTINUOUS
Status: DISCONTINUED | OUTPATIENT
Start: 2023-10-10 | End: 2023-10-10 | Stop reason: HOSPADM

## 2023-10-10 RX ORDER — ONDANSETRON 2 MG/ML
4 INJECTION INTRAMUSCULAR; INTRAVENOUS ONCE AS NEEDED
Status: CANCELLED | OUTPATIENT
Start: 2023-10-10 | End: 2023-10-10

## 2023-10-10 RX ORDER — SIMETHICONE 20 MG/.3ML
EMULSION ORAL AS NEEDED
Status: DISCONTINUED | OUTPATIENT
Start: 2023-10-10 | End: 2023-10-10 | Stop reason: HOSPADM

## 2023-10-10 RX ORDER — PROPOFOL 10 MG/ML
VIAL (ML) INTRAVENOUS AS NEEDED
Status: DISCONTINUED | OUTPATIENT
Start: 2023-10-10 | End: 2023-10-10 | Stop reason: SURG

## 2023-10-10 RX ORDER — SODIUM CHLORIDE 0.9 % (FLUSH) 0.9 %
10 SYRINGE (ML) INJECTION AS NEEDED
Status: DISCONTINUED | OUTPATIENT
Start: 2023-10-10 | End: 2023-10-10 | Stop reason: HOSPADM

## 2023-10-10 RX ADMIN — SODIUM CHLORIDE, POTASSIUM CHLORIDE, SODIUM LACTATE AND CALCIUM CHLORIDE 1000 ML: 600; 310; 30; 20 INJECTION, SOLUTION INTRAVENOUS at 08:23

## 2023-10-10 RX ADMIN — LIDOCAINE HYDROCHLORIDE 100 MG: 20 INJECTION, SOLUTION INTRAVENOUS at 08:56

## 2023-10-10 RX ADMIN — PROPOFOL 300 MG: 10 INJECTION, EMULSION INTRAVENOUS at 08:56

## 2023-10-10 NOTE — ANESTHESIA POSTPROCEDURE EVALUATION
Patient: Justina Ch    Procedure Summary       Date: 10/10/23 Room / Location: [facility] ENDOSCOPY 1 / [facility] ENDOSCOPY    Anesthesia Start: 0852 Anesthesia Stop: 0917    Procedure: COLONOSCOPY WITH POLYPECTOMY X2 (Anus) Diagnosis:       Positive colorectal cancer screening using Cologuard test      (Positive colorectal cancer screening using Cologuard test [R19.5])    Surgeons: Pam Lema MD Provider: Willy Ruiz CRNA    Anesthesia Type: MAC ASA Status: 2            Anesthesia Type: MAC    Vitals  Vitals Value Taken Time   /64 10/10/23 0931   Temp 97.3 øF (36.3 øC) 10/10/23 0918   Pulse 56 10/10/23 0918   Resp 18 10/10/23 0918   SpO2 93 % 10/10/23 0927   Vitals shown include unfiled device data.          Post Anesthesia Care and Evaluation    Patient location during evaluation: PHASE II  Patient participation: complete - patient participated  Level of consciousness: awake  Pain score: 1  Pain management: adequate    Airway patency: patent  Anesthetic complications: No anesthetic complications  PONV Status: controlled  Cardiovascular status: acceptable and stable  Respiratory status: acceptable  Hydration status: acceptable    Comments: See Nursing record for post procedural Vital Signs as per protocol.

## 2023-10-10 NOTE — ANESTHESIA PREPROCEDURE EVALUATION
Anesthesia Evaluation     Patient summary reviewed and Nursing notes reviewed   NPO Solid Status: > 8 hours  NPO Liquid Status: > 8 hours           Airway   Mallampati: II  TM distance: >3 FB  Neck ROM: full  Possible difficult intubation  Dental - normal exam     Pulmonary - normal exam   (+) a smoker Former,  Cardiovascular - normal exam  Exercise tolerance: good (4-7 METS)    ECG reviewed  Patient on routine beta blocker    (+) hypertension, dysrhythmias (hx of svt s/p ablation) Tachycardia, hyperlipidemia,  carotid artery disease    ROS comment: 5/12/23 EKG - sinus bobbi    Neuro/Psych  (+) dizziness/light headedness, psychiatric history  GI/Hepatic/Renal/Endo    (+) thyroid problem hypothyroidism    Musculoskeletal (-) negative ROS    Abdominal    Substance History - negative use     OB/GYN negative ob/gyn ROS         Other      history of cancer (hx of cervical)                  Anesthesia Plan    ASA 2     MAC     (Risks and benefits discussed including risk of aspiration, recall and dental damage. All patient questions answered.    Will continue with plan of care.)  intravenous induction     Anesthetic plan, risks, benefits, and alternatives have been provided, discussed and informed consent has been obtained with: patient.  Pre-procedure education provided  Plan discussed with CRNA.    CODE STATUS:

## 2023-10-11 LAB — REF LAB TEST METHOD: NORMAL

## 2023-10-12 ENCOUNTER — TELEPHONE (OUTPATIENT)
Dept: GASTROENTEROLOGY | Facility: CLINIC | Age: 68
End: 2023-10-12
Payer: MEDICARE

## 2023-10-12 NOTE — PROGRESS NOTES
Please give the patient the following message:  ----- Results -----  One of the resected colon polyps was noted to be a serrated polyp on pathology.  This is a precancerous type of polyp.  This is not cancer. The other polyp was non cancerous in nature.  Surveillance colonoscopy is suggested in 5 years.

## 2023-10-12 NOTE — TELEPHONE ENCOUNTER
"Relay     Dr. Lema said to Please give the patient the following message:  ----- Results -----  \"One of the resected colon polyps was noted to be a serrated polyp on pathology.  This is a precancerous type of polyp.  This is not cancer. The other polyp was non cancerous in nature.  Surveillance colonoscopy is suggested in 5 years.\"                "

## 2023-10-12 NOTE — TELEPHONE ENCOUNTER
The patient called back, information re: results given to the patient.  She states understanding.

## 2023-10-12 NOTE — TELEPHONE ENCOUNTER
----- Message from Pam Lema MD sent at 10/12/2023 11:21 AM EDT -----  Please give the patient the following message:  ----- Results -----  One of the resected colon polyps was noted to be a serrated polyp on pathology.  This is a precancerous type of polyp.  This is not cancer. The other polyp was non cancerous in nature.  Surveillance colonoscopy is suggested in 5 years.

## 2023-10-24 ENCOUNTER — OFFICE VISIT (OUTPATIENT)
Dept: FAMILY MEDICINE CLINIC | Facility: CLINIC | Age: 68
End: 2023-10-24
Payer: MEDICARE

## 2023-10-24 VITALS
SYSTOLIC BLOOD PRESSURE: 130 MMHG | OXYGEN SATURATION: 95 % | WEIGHT: 196 LBS | HEART RATE: 58 BPM | HEIGHT: 65 IN | BODY MASS INDEX: 32.65 KG/M2 | DIASTOLIC BLOOD PRESSURE: 75 MMHG

## 2023-10-24 DIAGNOSIS — M54.50 CHRONIC BILATERAL LOW BACK PAIN WITHOUT SCIATICA: ICD-10-CM

## 2023-10-24 DIAGNOSIS — R20.2 PARESTHESIA: ICD-10-CM

## 2023-10-24 DIAGNOSIS — E03.9 ACQUIRED HYPOTHYROIDISM: ICD-10-CM

## 2023-10-24 DIAGNOSIS — G89.29 CHRONIC BILATERAL LOW BACK PAIN WITHOUT SCIATICA: ICD-10-CM

## 2023-10-24 DIAGNOSIS — R29.898 LEG WEAKNESS, BILATERAL: Primary | ICD-10-CM

## 2023-10-24 PROCEDURE — 99214 OFFICE O/P EST MOD 30 MIN: CPT | Performed by: NURSE PRACTITIONER

## 2023-10-24 PROCEDURE — 3075F SYST BP GE 130 - 139MM HG: CPT | Performed by: NURSE PRACTITIONER

## 2023-10-24 PROCEDURE — 1160F RVW MEDS BY RX/DR IN RCRD: CPT | Performed by: NURSE PRACTITIONER

## 2023-10-24 PROCEDURE — 3078F DIAST BP <80 MM HG: CPT | Performed by: NURSE PRACTITIONER

## 2023-10-24 PROCEDURE — 1159F MED LIST DOCD IN RCRD: CPT | Performed by: NURSE PRACTITIONER

## 2023-10-24 NOTE — PROGRESS NOTES
Subjective     Chief Complaint:    Chief Complaint   Patient presents with    Extremity Weakness     Pt sts both legs are weak and one middle toe is numb and tingly,  sts hip and r upper thigh was hurting       History of Present Illness:   Legs feel weak, feels like she cannot stand on tip toes and lift herself up, not falling, no change in gait. Toes feel numb. Weakness feels like it its going down her legs.   Did have a few weeks of low back pain worse on left that seems to have improved, has been doing stretches to help get things worked out  No recent vaccines   No pain anywhere else   Recent labs with elevated tsh and dose increase on levothyroxine  Nikki/inflammatory makers negative a few weeks ago       Review of Systems  Gen- No fevers, chills  CV- No chest pain, palpitations  Resp- No cough, dyspnea  GI- No N/V/D, abd pain  Neuro-No dizziness, headaches      I have reviewed and/or updated the patient's past medical, surgical, family, social history and problem list as appropriate.     Medications:    Current Outpatient Medications:     albuterol sulfate  (90 Base) MCG/ACT inhaler, Inhale 2 puffs Every 4 (Four) Hours As Needed for Wheezing., Disp: 8.5 g, Rfl: 1    aspirin 81 MG tablet, Take 1 tablet by mouth Daily., Disp: , Rfl:     atenolol (TENORMIN) 25 MG tablet, Take 0.5 tablets by mouth Daily., Disp: 45 tablet, Rfl: 3    atorvastatin (LIPITOR) 40 MG tablet, Take 1 tablet by mouth Daily., Disp: 90 tablet, Rfl: 3    cetirizine (zyrTEC) 10 MG tablet, TAKE 1 TABLET BY MOUTH EVERY DAY, Disp: 90 tablet, Rfl: 3    flecainide (TAMBOCOR) 100 MG tablet, Take 1 tablet by mouth 2 (Two) Times a Day., Disp: 180 tablet, Rfl: 3    Fluticasone Furoate-Vilanterol (Breo Ellipta) 200-25 MCG/ACT inhaler, Inhale 1 puff Daily., Disp: 60 each, Rfl: 5    levothyroxine (SYNTHROID, LEVOTHROID) 150 MCG tablet, Take 1 tablet by mouth Every Morning., Disp: 30 tablet, Rfl: 2    meloxicam (MOBIC) 15 MG tablet, Take 1  "tablet by mouth Daily., Disp: , Rfl:     nitroglycerin (NITROSTAT) 0.4 MG SL tablet, 1 under the tongue as needed for angina, may repeat q5mins for up three doses, Disp: 30 tablet, Rfl: 3    Allergies:  Allergies   Allergen Reactions    Latex Rash       Objective     Vital Signs:   Vitals:    10/24/23 1319   BP: 130/75   Pulse: 58   SpO2: 95%   Weight: 88.9 kg (196 lb)   Height: 165.1 cm (65\")     Body mass index is 32.62 kg/m².    Physical Exam:    Physical Exam  Vitals and nursing note reviewed.   Constitutional:       Appearance: She is well-developed.   HENT:      Head: Normocephalic and atraumatic.   Eyes:      Pupils: Pupils are equal, round, and reactive to light.   Cardiovascular:      Rate and Rhythm: Normal rate and regular rhythm.      Heart sounds: Normal heart sounds.   Pulmonary:      Effort: Pulmonary effort is normal.      Breath sounds: Normal breath sounds.   Abdominal:      General: Bowel sounds are normal. There is no distension.      Palpations: Abdomen is soft.      Tenderness: There is no abdominal tenderness.   Musculoskeletal:      Cervical back: Neck supple.      Thoracic back: No tenderness.      Lumbar back: No tenderness. Negative right straight leg raise test and negative left straight leg raise test.      Right hip: No tenderness. Normal range of motion.      Left hip: No tenderness. Normal range of motion.   Feet:      Right foot:      Protective Sensation: 5 sites tested.  5 sites sensed.      Left foot:      Protective Sensation: 5 sites tested.  5 sites sensed.   Skin:     General: Skin is warm and dry.   Neurological:      General: No focal deficit present.      Mental Status: She is alert and oriented to person, place, and time.   Psychiatric:         Mood and Affect: Mood normal.         Behavior: Behavior normal.         Assessment / Plan     Assessment/Plan:   Problem List Items Addressed This Visit       Acquired hypothyroidism    Relevant Medications    atenolol (TENORMIN) " 25 MG tablet    levothyroxine (SYNTHROID, LEVOTHROID) 150 MCG tablet    Other Relevant Orders    TSH     Other Visit Diagnoses       Leg weakness, bilateral    -  Primary    Relevant Orders    XR Spine Lumbar Complete With Flex & Ext    Ambulatory Referral to Physical Therapy Evaluate and treat    Paresthesia        Relevant Orders    XR Spine Lumbar Complete With Flex & Ext    MRI Brain Without Contrast    Vitamin B12    Folate    Ambulatory Referral to Physical Therapy Evaluate and treat    Chronic bilateral low back pain without sciatica        Relevant Orders    Ambulatory Referral to Physical Therapy Evaluate and treat            -- check labs and xray,   -- needs brain mri due to progressive symptoms and bilateral nature of symptoms   -- check back xray although my suspicion is low given her improvement in back issues    Discussed plan of care in detail with pt today; pt verb understanding and agrees.    Follow up:  2-3 weeks      Electronically signed by JOSE Baez   10/24/2023 13:31 EDT      Please note that portions of this note were completed with a voice recognition program.

## 2023-10-25 ENCOUNTER — HOSPITAL ENCOUNTER (OUTPATIENT)
Dept: GENERAL RADIOLOGY | Facility: HOSPITAL | Age: 68
Discharge: HOME | End: 2023-10-25
Admitting: NURSE PRACTITIONER
Payer: MEDICARE

## 2023-10-25 DIAGNOSIS — R29.898 LEG WEAKNESS, BILATERAL: ICD-10-CM

## 2023-10-25 DIAGNOSIS — R20.2 PARESTHESIA: ICD-10-CM

## 2023-10-25 LAB
FOLATE SERPL-MCNC: >20 NG/ML (ref 4.78–24.2)
TSH SERPL DL<=0.005 MIU/L-ACNC: 1.51 UIU/ML (ref 0.27–4.2)
VIT B12 SERPL-MCNC: 475 PG/ML (ref 211–946)

## 2023-10-25 PROCEDURE — 72114 X-RAY EXAM L-S SPINE BENDING: CPT

## 2023-10-27 DIAGNOSIS — E03.9 ACQUIRED HYPOTHYROIDISM: ICD-10-CM

## 2023-10-27 RX ORDER — LEVOTHYROXINE SODIUM 0.15 MG/1
150 TABLET ORAL
Qty: 90 TABLET | Refills: 3 | Status: SHIPPED | OUTPATIENT
Start: 2023-10-27

## 2023-10-27 NOTE — PROGRESS NOTES
Xray of your back shows degenerative changes that are common and associated with aging. Lets proceed with current plan of brain MRI and PT

## 2023-11-07 ENCOUNTER — OFFICE VISIT (OUTPATIENT)
Dept: FAMILY MEDICINE CLINIC | Facility: CLINIC | Age: 68
End: 2023-11-07
Payer: MEDICARE

## 2023-11-07 VITALS
TEMPERATURE: 97.3 F | OXYGEN SATURATION: 95 % | WEIGHT: 196.6 LBS | HEART RATE: 66 BPM | SYSTOLIC BLOOD PRESSURE: 122 MMHG | DIASTOLIC BLOOD PRESSURE: 68 MMHG | BODY MASS INDEX: 31.6 KG/M2 | HEIGHT: 66 IN

## 2023-11-07 DIAGNOSIS — R29.898 LEG WEAKNESS, BILATERAL: Primary | ICD-10-CM

## 2023-11-07 DIAGNOSIS — R20.2 PARESTHESIA: ICD-10-CM

## 2023-11-07 PROCEDURE — 1159F MED LIST DOCD IN RCRD: CPT | Performed by: NURSE PRACTITIONER

## 2023-11-07 PROCEDURE — 1160F RVW MEDS BY RX/DR IN RCRD: CPT | Performed by: NURSE PRACTITIONER

## 2023-11-07 PROCEDURE — 99213 OFFICE O/P EST LOW 20 MIN: CPT | Performed by: NURSE PRACTITIONER

## 2023-11-07 PROCEDURE — 3078F DIAST BP <80 MM HG: CPT | Performed by: NURSE PRACTITIONER

## 2023-11-07 PROCEDURE — 3074F SYST BP LT 130 MM HG: CPT | Performed by: NURSE PRACTITIONER

## 2023-11-07 NOTE — PROGRESS NOTES
Subjective     Chief Complaint:    Chief Complaint   Patient presents with    Leg weakness, bilateral     Patient states she has began PT since last being in office. She has now been to a total of 3 sessions.     Follow-up     Patient states she will soon be going out of town for 6 weeks and states PT told her she will need a new referral to return when she comes back. Patient is leaving on 11/17/23 and not returning until January.        History of Present Illness:   Has been going to pT, remebers hx of right sciatica with nerve impingement 14 years ago and has favored walking on the right side. PT is going well, she leave for [place] soon and notes she will be gone until jan but will her home exercises. Notes very little improvement in weakness. Notes continued numbness in toes that does seem worse on the left   Brain mri shows small vessel disease  Lumbar xray shows ddd    From [facility] on 10/24/23  Legs feel weak, feels like she cannot stand on tip toes and lift herself up, not falling, no change in gait. Toes feel numb. Weakness feels like it its going down her legs.   Did have a few weeks of low back pain worse on left that seems to have improved, has been doing stretches to help get things worked out  No recent vaccines   No pain anywhere else   Recent labs with elevated tsh and dose increase on levothyroxine  Nikki/inflammatory makers negative a few weeks ago    Review of Systems  Gen- No fevers, chills  CV- No chest pain, palpitations  Resp- No cough, dyspnea  GI- No N/V/D, abd pain  Neuro-No dizziness, headaches      I have reviewed and/or updated the patient's past medical, surgical, family, social history and problem list as appropriate.     Medications:    Current Outpatient Medications:     albuterol sulfate  (90 Base) MCG/ACT inhaler, Inhale 2 puffs Every 4 (Four) Hours As Needed for Wheezing., Disp: 8.5 g, Rfl: 1    aspirin 81 MG tablet, Take 1 tablet by mouth Daily., Disp: , Rfl:     atenolol  "(TENORMIN) 25 MG tablet, Take 0.5 tablets by mouth Daily., Disp: 45 tablet, Rfl: 3    atorvastatin (LIPITOR) 40 MG tablet, Take 1 tablet by mouth Daily., Disp: 90 tablet, Rfl: 3    cetirizine (zyrTEC) 10 MG tablet, TAKE 1 TABLET BY MOUTH EVERY DAY, Disp: 90 tablet, Rfl: 3    flecainide (TAMBOCOR) 100 MG tablet, Take 1 tablet by mouth 2 (Two) Times a Day., Disp: 180 tablet, Rfl: 3    Fluticasone Furoate-Vilanterol (Breo Ellipta) 200-25 MCG/ACT inhaler, Inhale 1 puff Daily., Disp: 60 each, Rfl: 5    levothyroxine (SYNTHROID, LEVOTHROID) 150 MCG tablet, Take 1 tablet by mouth Every Morning., Disp: 90 tablet, Rfl: 3    meloxicam (MOBIC) 15 MG tablet, Take 1 tablet by mouth Daily., Disp: , Rfl:     nitroglycerin (NITROSTAT) 0.4 MG SL tablet, 1 under the tongue as needed for angina, may repeat q5mins for up three doses (Patient not taking: Reported on 11/7/2023), Disp: 30 tablet, Rfl: 3    Allergies:  Allergies   Allergen Reactions    Latex Rash       Objective     Vital Signs:   Vitals:    11/07/23 1501   BP: 122/68   BP Location: Right arm   Patient Position: Sitting   Cuff Size: Adult   Pulse: 66   Temp: 97.3 °F (36.3 °C)   TempSrc: Temporal   SpO2: 95%   Weight: 89.2 kg (196 lb 9.6 oz)   Height: 167.6 cm (66\")  Comment: patient stated   PainSc: 0-No pain     Body mass index is 31.73 kg/m².    Physical Exam:    Physical Exam  Vitals and nursing note reviewed.   Constitutional:       Appearance: She is well-developed.   HENT:      Head: Normocephalic and atraumatic.   Eyes:      Pupils: Pupils are equal, round, and reactive to light.   Cardiovascular:      Rate and Rhythm: Normal rate and regular rhythm.      Heart sounds: Normal heart sounds.   Pulmonary:      Effort: Pulmonary effort is normal.      Breath sounds: Normal breath sounds.   Abdominal:      General: Bowel sounds are normal. There is no distension.      Palpations: Abdomen is soft.      Tenderness: There is no abdominal tenderness.   Musculoskeletal:      " Cervical back: Neck supple.      Thoracic back: No tenderness.      Lumbar back: No tenderness.   Skin:     General: Skin is warm and dry.   Neurological:      General: No focal deficit present.      Mental Status: She is alert and oriented to person, place, and time.   Psychiatric:         Mood and Affect: Mood normal.         Behavior: Behavior normal.         Assessment / Plan     Assessment/Plan:   Problem List Items Addressed This Visit    None  Visit Diagnoses       Leg weakness, bilateral    -  Primary    Paresthesia              -- I suspect her symptoms are coming from her back, continue PT, her workup thus far has been reassuring, we will meet again in [facility] and can consider MRI lumbar spine at that time if she has not improved    Discussed plan of care in detail with pt today; pt verb understanding and agrees.    Follow up:  Mid Jan    Electronically signed by JOSE Baez   11/07/2023 15:11 EST      Please note that portions of this note were completed with a voice recognition program.

## 2023-11-28 DIAGNOSIS — J30.2 SEASONAL ALLERGIC RHINITIS: ICD-10-CM

## 2023-11-28 DIAGNOSIS — R09.82 PND (POST-NASAL DRIP): ICD-10-CM

## 2023-11-30 RX ORDER — CETIRIZINE HYDROCHLORIDE 10 MG/1
TABLET ORAL
Qty: 90 TABLET | Refills: 3 | Status: SHIPPED | OUTPATIENT
Start: 2023-11-30

## 2024-01-31 ENCOUNTER — OFFICE VISIT (OUTPATIENT)
Dept: FAMILY MEDICINE CLINIC | Facility: CLINIC | Age: 69
End: 2024-01-31
Payer: MEDICARE

## 2024-01-31 VITALS
SYSTOLIC BLOOD PRESSURE: 138 MMHG | BODY MASS INDEX: 31.96 KG/M2 | HEART RATE: 60 BPM | DIASTOLIC BLOOD PRESSURE: 78 MMHG | WEIGHT: 198 LBS

## 2024-01-31 DIAGNOSIS — I47.10 SUPRAVENTRICULAR TACHYCARDIA: Primary | ICD-10-CM

## 2024-01-31 DIAGNOSIS — G89.29 CHRONIC BILATERAL LOW BACK PAIN WITHOUT SCIATICA: ICD-10-CM

## 2024-01-31 DIAGNOSIS — I10 PRIMARY HYPERTENSION: ICD-10-CM

## 2024-01-31 DIAGNOSIS — M54.50 CHRONIC BILATERAL LOW BACK PAIN WITHOUT SCIATICA: ICD-10-CM

## 2024-01-31 DIAGNOSIS — G89.29 CHRONIC PAIN OF RIGHT KNEE: ICD-10-CM

## 2024-01-31 DIAGNOSIS — M25.561 CHRONIC PAIN OF RIGHT KNEE: ICD-10-CM

## 2024-01-31 PROCEDURE — 99214 OFFICE O/P EST MOD 30 MIN: CPT | Performed by: NURSE PRACTITIONER

## 2024-01-31 PROCEDURE — 1160F RVW MEDS BY RX/DR IN RCRD: CPT | Performed by: NURSE PRACTITIONER

## 2024-01-31 PROCEDURE — 1159F MED LIST DOCD IN RCRD: CPT | Performed by: NURSE PRACTITIONER

## 2024-01-31 PROCEDURE — 3075F SYST BP GE 130 - 139MM HG: CPT | Performed by: NURSE PRACTITIONER

## 2024-01-31 PROCEDURE — 3078F DIAST BP <80 MM HG: CPT | Performed by: NURSE PRACTITIONER

## 2024-01-31 NOTE — PROGRESS NOTES
Subjective     Chief Complaint:    Knee pain    History of Present Illness:   Notes right knee pain, right leg giving out at times, does not feel weak in the left, was doing PT but just for a short time due to planned trip, stopped when she was in [place], pain in right knee for awhile, has OA, stairs make it worse, gait is off,    On mobic  HLD on statin  HTN/arrhythmia on atenolol and flecainide  Asthma on breo  Numbess and tingling still present in feet and still cannot get on tip toes      Review of Systems  Gen- No fevers, chills  CV- No chest pain, palpitations  Resp- No cough, dyspnea  GI- No N/V/D, abd pain  Neuro-No dizziness, headaches      I have reviewed and/or updated the patient's past medical, surgical, family, social history and problem list as appropriate.     Medications:    Current Outpatient Medications:     albuterol sulfate  (90 Base) MCG/ACT inhaler, Inhale 2 puffs Every 4 (Four) Hours As Needed for Wheezing., Disp: 8.5 g, Rfl: 1    aspirin 81 MG tablet, Take 1 tablet by mouth Daily., Disp: , Rfl:     atenolol (TENORMIN) 25 MG tablet, Take 0.5 tablets by mouth Daily., Disp: 45 tablet, Rfl: 3    atorvastatin (LIPITOR) 40 MG tablet, Take 1 tablet by mouth Daily., Disp: 90 tablet, Rfl: 3    cetirizine (zyrTEC) 10 MG tablet, TAKE 1 TABLET BY MOUTH EVERY DAY, Disp: 90 tablet, Rfl: 3    flecainide (TAMBOCOR) 100 MG tablet, Take 1 tablet by mouth 2 (Two) Times a Day., Disp: 180 tablet, Rfl: 3    Fluticasone Furoate-Vilanterol (Breo Ellipta) 200-25 MCG/ACT inhaler, Inhale 1 puff Daily., Disp: 60 each, Rfl: 5    levothyroxine (SYNTHROID, LEVOTHROID) 150 MCG tablet, Take 1 tablet by mouth Every Morning., Disp: 90 tablet, Rfl: 3    meloxicam (MOBIC) 15 MG tablet, Take 1 tablet by mouth Daily., Disp: , Rfl:     nitroglycerin (NITROSTAT) 0.4 MG SL tablet, 1 under the tongue as needed for angina, may repeat q5mins for up three doses (Patient not taking: Reported on 11/7/2023), Disp: 30 tablet, Rfl:  3    Allergies:  Allergies   Allergen Reactions    Latex Rash       Objective     Vital Signs:   Vitals:    01/31/24 1405   BP: 138/78   Pulse: 60   Weight: 89.8 kg (198 lb)     Body mass index is 31.96 kg/m².    Physical Exam:    Physical Exam  Vitals and nursing note reviewed.   Constitutional:       Appearance: She is well-developed.   HENT:      Head: Normocephalic and atraumatic.   Eyes:      Pupils: Pupils are equal, round, and reactive to light.   Cardiovascular:      Rate and Rhythm: Normal rate and regular rhythm.      Heart sounds: Normal heart sounds.   Pulmonary:      Effort: Pulmonary effort is normal.      Breath sounds: Normal breath sounds.   Abdominal:      General: Bowel sounds are normal. There is no distension.      Palpations: Abdomen is soft.      Tenderness: There is no abdominal tenderness.   Musculoskeletal:      Cervical back: Neck supple.      Thoracic back: No tenderness.      Lumbar back: No tenderness.      Right knee: Bony tenderness and crepitus present. Tenderness present. Normal alignment.      Instability Tests: Anterior drawer test negative. Posterior drawer test negative.   Skin:     General: Skin is warm and dry.   Neurological:      General: No focal deficit present.      Mental Status: She is alert and oriented to person, place, and time.   Psychiatric:         Mood and Affect: Mood normal.         Behavior: Behavior normal.         Assessment / Plan     Assessment/Plan:   Problem List Items Addressed This Visit       Supraventricular tachycardia - Primary    Relevant Medications    atenolol (TENORMIN) 25 MG tablet    nitroglycerin (NITROSTAT) 0.4 MG SL tablet    Hypertension    Overview     1 Stress test 7/23/14 - low risk, no ischemia   6.2 Echo 7/21/14 - EF 55-60%; DD II; trace MR, TR           Relevant Medications    atenolol (TENORMIN) 25 MG tablet     Other Visit Diagnoses       Chronic bilateral low back pain without sciatica        Relevant Orders    Ambulatory  Referral to Physical Therapy Evaluate and treat    Chronic pain of right knee        Relevant Orders    XR Knee 3 View Right    Ambulatory Referral to Physical Therapy Evaluate and treat          -- ortho for knee eval, consideration of injection  -- restart PT  -- chronic conditions stable      Discussed plan of care in detail with pt today; pt verb understanding and agrees.    Follow up:  2 months    Electronically signed by JOSE Baez         Please note that portions of this note were completed with a voice recognition program.

## 2024-02-26 DIAGNOSIS — I65.23 BILATERAL CAROTID ARTERY STENOSIS: ICD-10-CM

## 2024-02-26 DIAGNOSIS — E78.5 DYSLIPIDEMIA: ICD-10-CM

## 2024-02-26 RX ORDER — ATORVASTATIN CALCIUM 40 MG/1
40 TABLET, FILM COATED ORAL DAILY
Qty: 90 TABLET | Refills: 3 | Status: SHIPPED | OUTPATIENT
Start: 2024-02-26

## 2024-03-08 ENCOUNTER — OFFICE VISIT (OUTPATIENT)
Dept: CARDIOLOGY | Facility: CLINIC | Age: 69
End: 2024-03-08
Payer: MEDICARE

## 2024-03-08 VITALS
BODY MASS INDEX: 30.86 KG/M2 | OXYGEN SATURATION: 97 % | HEIGHT: 66 IN | SYSTOLIC BLOOD PRESSURE: 122 MMHG | DIASTOLIC BLOOD PRESSURE: 82 MMHG | HEART RATE: 57 BPM | WEIGHT: 192 LBS

## 2024-03-08 DIAGNOSIS — I47.10 SUPRAVENTRICULAR TACHYCARDIA: Primary | Chronic | ICD-10-CM

## 2024-03-08 DIAGNOSIS — I10 PRIMARY HYPERTENSION: Chronic | ICD-10-CM

## 2024-03-08 DIAGNOSIS — I49.1 PAC (PREMATURE ATRIAL CONTRACTION): Chronic | ICD-10-CM

## 2024-03-08 NOTE — PROGRESS NOTES
Justina Sams Jing  1955  [phone]    [facility] CARDIOLOGY     Sonali Maldonado, APRN  [address] [place] [address]    Chief Complaint   Patient presents with    Supraventricular tachycardia       Problem List:     Supraventricular tachycardia:   History of tachypalpitations and emergency room visits dating back to at least 2-3 years ago.   Emergency room visit in July with subsequent hospitalization for supraventricular tachycardia at [facility] for initiation of flecainide.   Event recorder, July 2014, demonstrating runs of narrow QRS tachycardia, as fast as 190-200 beats per minute.   Echocardiogram, 07/21/2014: Normal left ventricular size and function with ejection fraction of 55%. No significant valvular insufficiency.   [facility], database normal, per patient, database incomplete.  Status post EP study and RFA of left atrial tachycardia and right atrial isthmus-dependent flutter on 10/04/2014.  Recurrent tachypalpitations with event monitor, 01/23/2015 thru 02/21/2015, showing sinus rhythm with occasional PAC and sinus arrhythmia and no SVT or atrial fibrillation.   Initiation of Tambocor therapy for suppression of PACs, 02/27/2015.   Echo 7/12/17 EF 65%, trace TR  Nuclear stress test 7/12/17 EF 75%, no ischemia  Echocardiogram 10/2/18: EF 60%, no significant valvular disease  Event monitor 11/19/18: Predominately NSR, <1% PAC's/PVC's  Grave’s disease, status post ablation with hypothyroidism.   Dyslipidemia.   Hypertension.   Skin cancer.   Surgical history:  D and C.   Skin excision     History of Present Illness:    Pt presents for follow up of PACs, atrial flutter, atrial tachycardia, HTN. Since we last saw the pt, pt denies any AF/palpitations episodes, SOB, CP, LH, and dizziness, syncope.  Denies any hospitalizations, ER visits, bleeding, or TIA/CVA symptoms. Overall feels well.        Allergies  Allergies   Allergen Reactions     "Latex Rash       Current Medications    Current Outpatient Medications:     albuterol sulfate  (90 Base) MCG/ACT inhaler, Inhale 2 puffs Every 4 (Four) Hours As Needed for Wheezing., Disp: 8.5 g, Rfl: 1    aspirin 81 MG tablet, Take 1 tablet by mouth Daily., Disp: , Rfl:     atenolol (TENORMIN) 25 MG tablet, Take 0.5 tablets by mouth Daily., Disp: 45 tablet, Rfl: 3    atorvastatin (LIPITOR) 40 MG tablet, TAKE 1 TABLET BY MOUTH DAILY, Disp: 90 tablet, Rfl: 3    cetirizine (zyrTEC) 10 MG tablet, TAKE 1 TABLET BY MOUTH EVERY DAY, Disp: 90 tablet, Rfl: 3    flecainide (TAMBOCOR) 100 MG tablet, Take 1 tablet by mouth 2 (Two) Times a Day., Disp: 180 tablet, Rfl: 3    Fluticasone Furoate-Vilanterol (Breo Ellipta) 200-25 MCG/ACT inhaler, Inhale 1 puff Daily., Disp: 60 each, Rfl: 5    levothyroxine (SYNTHROID, LEVOTHROID) 150 MCG tablet, Take 1 tablet by mouth Every Morning., Disp: 90 tablet, Rfl: 3    meloxicam (MOBIC) 15 MG tablet, Take 1 tablet by mouth Daily., Disp: , Rfl:     nitroglycerin (NITROSTAT) 0.4 MG SL tablet, 1 under the tongue as needed for angina, may repeat q5mins for up three doses, Disp: 30 tablet, Rfl: 3      ROS:  General:  Denies fatigue, weight gain or loss  Cardiovascular:  Denies CP, PND, syncope, near syncope, edema or palpitations.  Pulmonary:  Denies KIRKPATRICK, cough, or wheezing      Vitals:    03/08/24 1012   BP: 122/82   BP Location: Left arm   Patient Position: Sitting   Pulse: 57   SpO2: 97%   Weight: 87.1 kg (192 lb)   Height: 167.6 cm (66\")     Body mass index is 30.99 kg/m².  PE:  General: NAD. A & O x 3   Neck: no JVD, no carotid bruits, no TM  Heart RRR, NL S1, S2, S4 present, no rubs, murmurs  Lungs: CTA, no wheezes, rhonchi, or rales  Abd: soft, non-tender, NL BS  Ext: No musculoskeletal deformities, no edema, cyanosis, or clubbing  Psych: normal mood and affect    Diagnostic Data:        ECG 12 Lead    Date/Time: 3/8/2024 10:30 AM  Performed by: Michael Hdz MD    Authorized by: " Michael Hdz MD  Rhythm: sinus bradycardia  Rate: bradycardic  QRS axis: normal  Other findings: low voltage    Clinical impression: non-specific ECG                 1. Supraventricular tachycardia    2. PAC (premature atrial contraction)    3. Primary hypertension        Plan:  1) PAC's: doing very well on meds  2) AT/AFL:  - S/p RFA Doing well, No clinical recurrence.    3) Anticoagulation:  - Continue ASA   4) HTN:  - Well controlled on meds: atenolol  - Wt loss, exercise, salt reduction    .

## 2024-03-14 ENCOUNTER — TELEPHONE (OUTPATIENT)
Dept: FAMILY MEDICINE CLINIC | Facility: CLINIC | Age: 69
End: 2024-03-14
Payer: MEDICARE

## 2024-03-15 DIAGNOSIS — G89.29 CHRONIC BILATERAL LOW BACK PAIN WITHOUT SCIATICA: Primary | ICD-10-CM

## 2024-03-15 DIAGNOSIS — M25.561 CHRONIC PAIN OF RIGHT KNEE: ICD-10-CM

## 2024-03-15 DIAGNOSIS — G89.29 CHRONIC PAIN OF RIGHT KNEE: ICD-10-CM

## 2024-03-15 DIAGNOSIS — M54.50 CHRONIC BILATERAL LOW BACK PAIN WITHOUT SCIATICA: Primary | ICD-10-CM

## 2024-04-03 ENCOUNTER — OFFICE VISIT (OUTPATIENT)
Dept: FAMILY MEDICINE CLINIC | Facility: CLINIC | Age: 69
End: 2024-04-03
Payer: MEDICARE

## 2024-04-03 VITALS
BODY MASS INDEX: 30.86 KG/M2 | HEART RATE: 61 BPM | HEIGHT: 66 IN | WEIGHT: 192 LBS | DIASTOLIC BLOOD PRESSURE: 85 MMHG | SYSTOLIC BLOOD PRESSURE: 120 MMHG | OXYGEN SATURATION: 94 %

## 2024-04-03 DIAGNOSIS — G89.29 CHRONIC PAIN OF RIGHT KNEE: Primary | ICD-10-CM

## 2024-04-03 DIAGNOSIS — M25.561 CHRONIC PAIN OF RIGHT KNEE: Primary | ICD-10-CM

## 2024-04-03 DIAGNOSIS — R29.898 WEAKNESS OF BOTH LOWER EXTREMITIES: ICD-10-CM

## 2024-04-03 DIAGNOSIS — R20.2 PARESTHESIA: ICD-10-CM

## 2024-04-03 DIAGNOSIS — L03.032 CELLULITIS OF GREAT TOE, LEFT: ICD-10-CM

## 2024-04-03 DIAGNOSIS — L60.0 INGROWING NAIL, LEFT GREAT TOE: ICD-10-CM

## 2024-04-03 RX ORDER — AMOXICILLIN AND CLAVULANATE POTASSIUM 875; 125 MG/1; MG/1
1 TABLET, FILM COATED ORAL 2 TIMES DAILY
Qty: 14 TABLET | Refills: 0 | Status: SHIPPED | OUTPATIENT
Start: 2024-04-03 | End: 2024-04-10

## 2024-04-03 NOTE — PROGRESS NOTES
Subjective     Chief Complaint:    Chief Complaint   Patient presents with    Knee Pain     Pt here for F/U    Ingrown Toenail     Pt has infected toe       History of Present Illness:   Patient here for 2 month f/u and infection of left great toe. Patient has been going to PT for right knee pain. Has been helping. Numbness and tingling still present in feet and still cannot get on tip toes. Has had a recent change where a family member has moved in with her, and since adjusting to new change, feels that she has been able to be more active.     Pt stated that about a week ago left great toenail appeared ingrown, and pt started to dig at ingrown toenail to attempt to remove it. Since this started, she has used paroxide, neosporin, and bandaged at night time/with shoe wearing with no decrease in symptoms. Endorses that toe has been red since episode, with pus coming out of end of toe. Endorses that toe is swollen, and that the skin has been peeling around the end of her toe. Endorses that her toe is throbbing; denies taking anything for the pain. Has had to wear wider shoes to accommodate for the swelling and pain with toe.         Review of Systems  Gen- No fevers, chills  CV- No chest pain, palpitations  Resp- No cough, dyspnea  GI- No N/V/D, abd pain  Neuro-No dizziness, headaches      I have reviewed and/or updated the patient's past medical, surgical, family, social history and problem list as appropriate.     Medications:    Current Outpatient Medications:     albuterol sulfate  (90 Base) MCG/ACT inhaler, Inhale 2 puffs Every 4 (Four) Hours As Needed for Wheezing., Disp: 8.5 g, Rfl: 1    aspirin 81 MG tablet, Take 1 tablet by mouth Daily., Disp: , Rfl:     atenolol (TENORMIN) 25 MG tablet, Take 0.5 tablets by mouth Daily., Disp: 45 tablet, Rfl: 3    atorvastatin (LIPITOR) 40 MG tablet, TAKE 1 TABLET BY MOUTH DAILY, Disp: 90 tablet, Rfl: 3    cetirizine (zyrTEC) 10 MG tablet, TAKE 1 TABLET BY MOUTH  "EVERY DAY, Disp: 90 tablet, Rfl: 3    flecainide (TAMBOCOR) 100 MG tablet, Take 1 tablet by mouth 2 (Two) Times a Day., Disp: 180 tablet, Rfl: 3    Fluticasone Furoate-Vilanterol (Breo Ellipta) 200-25 MCG/ACT inhaler, Inhale 1 puff Daily., Disp: 60 each, Rfl: 5    levothyroxine (SYNTHROID, LEVOTHROID) 150 MCG tablet, Take 1 tablet by mouth Every Morning., Disp: 90 tablet, Rfl: 3    meloxicam (MOBIC) 15 MG tablet, Take 1 tablet by mouth Daily., Disp: , Rfl:     nitroglycerin (NITROSTAT) 0.4 MG SL tablet, 1 under the tongue as needed for angina, may repeat q5mins for up three doses, Disp: 30 tablet, Rfl: 3    amoxicillin-clavulanate (AUGMENTIN) 875-125 MG per tablet, Take 1 tablet by mouth 2 (Two) Times a Day for 7 days., Disp: 14 tablet, Rfl: 0    Allergies:  Allergies   Allergen Reactions    Latex Rash       Objective     Vital Signs:   Vitals:    04/03/24 1354   BP: 120/85   Pulse: 61   SpO2: 94%   Weight: 87.1 kg (192 lb)   Height: 167.6 cm (66\")     Body mass index is 30.99 kg/m².    Physical Exam:    Physical Exam  Constitutional:       Appearance: She is obese.   HENT:      Head: Normocephalic and atraumatic.   Cardiovascular:      Rate and Rhythm: Normal rate and regular rhythm.      Pulses: Normal pulses.      Heart sounds: Normal heart sounds.   Pulmonary:      Effort: Pulmonary effort is normal.      Breath sounds: Normal breath sounds.   Abdominal:      General: Bowel sounds are normal.      Palpations: Abdomen is soft.   Musculoskeletal:      Cervical back: Neck supple.   Feet:      Left foot:      Skin integrity: Erythema and warmth present.      Toenail Condition: Left toenails are abnormally thick and ingrown.      Comments: Left great toe with peeling skin and pus from toenail   Skin:     General: Skin is warm and dry.   Neurological:      General: No focal deficit present.      Mental Status: She is alert. Mental status is at baseline.   Psychiatric:         Mood and Affect: Mood normal.         " Behavior: Behavior normal.       Assessment / Plan     Assessment/Plan:   Problem List Items Addressed This Visit       Chronic pain of right knee - Primary     Other Visit Diagnoses       Cellulitis of great toe, left        Relevant Medications    amoxicillin-clavulanate (AUGMENTIN) 875-125 MG per tablet    Other Relevant Orders    Ambulatory Referral to Podiatry    Ingrowing nail, left great toe        Relevant Medications    amoxicillin-clavulanate (AUGMENTIN) 875-125 MG per tablet    Other Relevant Orders    Ambulatory Referral to Podiatry    Weakness of both lower extremities        Relevant Orders    Ambulatory Referral to Neurology    Paresthesia        Relevant Orders    Ambulatory Referral to Neurology          -- augmentin, stop neosporin, get in with podiatry  -- will refer to neuro given her paresesthias and the inability of her to get up on tip toes in setting of regular physical therapy, if neuro workup negative will consider ortho    Discussed plan of care in detail with pt today; pt verb understanding and agrees.    Follow up:  3 months    ISonali, personally performed the services described in this documentation, as scribed by Shelby GARCIA student in my presence, and is both accurate and complete     Electronically signed by JOSE Baez   04/03/2024 13:34 EDT      Please note that portions of this note were completed with a voice recognition program.

## 2024-04-05 ENCOUNTER — OFFICE VISIT (OUTPATIENT)
Dept: CARDIOLOGY | Facility: CLINIC | Age: 69
End: 2024-04-05
Payer: MEDICARE

## 2024-04-05 VITALS
DIASTOLIC BLOOD PRESSURE: 66 MMHG | WEIGHT: 195.8 LBS | HEART RATE: 65 BPM | BODY MASS INDEX: 31.47 KG/M2 | OXYGEN SATURATION: 96 % | SYSTOLIC BLOOD PRESSURE: 129 MMHG | HEIGHT: 66 IN

## 2024-04-05 DIAGNOSIS — Z98.890 HISTORY OF RADIOFREQUENCY ABLATION (RFA) PROCEDURE FOR CARDIAC ARRHYTHMIA: ICD-10-CM

## 2024-04-05 DIAGNOSIS — I49.1 PAC (PREMATURE ATRIAL CONTRACTION): ICD-10-CM

## 2024-04-05 DIAGNOSIS — R07.89 OTHER CHEST PAIN: ICD-10-CM

## 2024-04-05 DIAGNOSIS — E78.5 DYSLIPIDEMIA: ICD-10-CM

## 2024-04-05 DIAGNOSIS — I10 PRIMARY HYPERTENSION: Primary | ICD-10-CM

## 2024-04-05 DIAGNOSIS — I65.23 BILATERAL CAROTID ARTERY STENOSIS: ICD-10-CM

## 2024-04-05 DIAGNOSIS — I51.89 DIASTOLIC DYSFUNCTION: ICD-10-CM

## 2024-04-05 DIAGNOSIS — I47.10 SUPRAVENTRICULAR TACHYCARDIA: ICD-10-CM

## 2024-04-05 DIAGNOSIS — I49.3 PVC (PREMATURE VENTRICULAR CONTRACTION): ICD-10-CM

## 2024-04-05 DIAGNOSIS — F17.211 CIGARETTE NICOTINE DEPENDENCE IN REMISSION: ICD-10-CM

## 2024-04-05 DIAGNOSIS — R00.2 PALPITATIONS: ICD-10-CM

## 2024-04-05 RX ORDER — ATORVASTATIN CALCIUM 40 MG/1
40 TABLET, FILM COATED ORAL DAILY
Qty: 90 TABLET | Refills: 3 | Status: SHIPPED | OUTPATIENT
Start: 2024-04-05

## 2024-04-05 RX ORDER — ATENOLOL 25 MG/1
12.5 TABLET ORAL DAILY
Qty: 45 TABLET | Refills: 3 | Status: SHIPPED | OUTPATIENT
Start: 2024-04-05

## 2024-04-05 RX ORDER — NITROGLYCERIN 0.4 MG/1
TABLET SUBLINGUAL
Qty: 30 TABLET | Refills: 3 | Status: SHIPPED | OUTPATIENT
Start: 2024-04-05

## 2024-04-05 NOTE — PROGRESS NOTES
Subjective   Follow up, SVT, HTN, s/p atrial flutter ablation  Justina Ch is a 69 y.o. female who presents to day for Follow-up (Here for 6 mo. F/u), Palpitations, Rapid Heart Rate, Hyperlipidemia, Hypertension, and Carotid Artery Disease.    CHIEF COMPLIANT  Chief Complaint   Patient presents with    Follow-up     Here for 6 mo. F/u    Palpitations    Rapid Heart Rate    Hyperlipidemia    Hypertension    Carotid Artery Disease   Problem List:      Supraventricular tachycardia:   History of tachypalpitations and emergency room visits dating back to at least 2-3 years ago.   Emergency room visit in July with subsequent hospitalization for supraventricular tachycardia at [facility] for initiation of flecainide.   Event recorder, July 2014, demonstrating runs of narrow QRS tachycardia, as fast as 190-200 beats per minute.   Echocardiogram, 07/21/2014: Normal left ventricular size and function with ejection fraction of 55%. No significant valvular insufficiency.   [facility], database normal, per patient, database incomplete.  Status post EP study and RFA of left atrial tachycardia and right atrial isthmus-dependent flutter on 10/04/2014.  Recurrent tachypalpitations with event monitor, 01/23/2015 thru 02/21/2015, showing sinus rhythm with occasional PAC and sinus arrhythmia and no SVT or atrial fibrillation.   Initiation of Tambocor therapy for suppression of PACs, 02/27/2015.   Echo 7/12/17 EF 65%, trace TR  Nuclear stress test 7/12/17 EF 75%, no ischemia  Echocardiogram 10/2/18: EF 60%, no significant valvular disease  Event monitor 11/19/18: Predominately NSR, <1% PAC's/PVC's  Grave’s disease, status post ablation with hypothyroidism.   Dyslipidemia.   Hypertension.   Skin cancer.   Surgical history:  D and C.   Skin excision     Problem List Items Addressed This Visit          Cardiac and Vasculature    Supraventricular tachycardia    Relevant Medications    atenolol  (TENORMIN) 25 MG tablet    nitroglycerin (NITROSTAT) 0.4 MG SL tablet    Palpitations    Relevant Medications    atenolol (TENORMIN) 25 MG tablet    Dyslipidemia    Relevant Medications    atorvastatin (LIPITOR) 40 MG tablet    Other Relevant Orders    Lipid Panel    Comprehensive Metabolic Panel    Hypertension - Primary    Overview     1 Stress test 7/23/14 - low risk, no ischemia   6.2 Echo 7/21/14 - EF 55-60%; DD II; trace MR, TR           Relevant Medications    atenolol (TENORMIN) 25 MG tablet    PAC (premature atrial contraction)    Relevant Medications    atenolol (TENORMIN) 25 MG tablet    nitroglycerin (NITROSTAT) 0.4 MG SL tablet    Bilateral carotid artery stenosis    Relevant Medications    atorvastatin (LIPITOR) 40 MG tablet    PVC (premature ventricular contraction)    Relevant Medications    atenolol (TENORMIN) 25 MG tablet    nitroglycerin (NITROSTAT) 0.4 MG SL tablet    Diastolic dysfunction    History of radiofrequency ablation (RFA) procedure for cardiac arrhythmia       Tobacco    Cigarette nicotine dependence in remission     Other Visit Diagnoses       Other chest pain        Relevant Medications    nitroglycerin (NITROSTAT) 0.4 MG SL tablet            HPI  She has been doing pretty well she is denying any palpitations except very rare episodes no shortness of breath on exertion no chest pain no edema no dizziness she has been recently seen by electrophysiology service and she has been doing fine                      PRIOR MEDS  Current Outpatient Medications on File Prior to Visit   Medication Sig Dispense Refill    albuterol sulfate  (90 Base) MCG/ACT inhaler Inhale 2 puffs Every 4 (Four) Hours As Needed for Wheezing. 8.5 g 1    amoxicillin-clavulanate (AUGMENTIN) 875-125 MG per tablet Take 1 tablet by mouth 2 (Two) Times a Day for 7 days. 14 tablet 0    aspirin 81 MG tablet Take 1 tablet by mouth Daily.      cetirizine (zyrTEC) 10 MG tablet TAKE 1 TABLET BY MOUTH EVERY DAY 90 tablet  3    flecainide (TAMBOCOR) 100 MG tablet Take 1 tablet by mouth 2 (Two) Times a Day. 180 tablet 3    Fluticasone Furoate-Vilanterol (Breo Ellipta) 200-25 MCG/ACT inhaler Inhale 1 puff Daily. 60 each 5    levothyroxine (SYNTHROID, LEVOTHROID) 150 MCG tablet Take 1 tablet by mouth Every Morning. 90 tablet 3    meloxicam (MOBIC) 15 MG tablet Take 1 tablet by mouth Daily.      [DISCONTINUED] atenolol (TENORMIN) 25 MG tablet Take 0.5 tablets by mouth Daily. 45 tablet 3    [DISCONTINUED] atorvastatin (LIPITOR) 40 MG tablet TAKE 1 TABLET BY MOUTH DAILY 90 tablet 3    [DISCONTINUED] nitroglycerin (NITROSTAT) 0.4 MG SL tablet 1 under the tongue as needed for angina, may repeat q5mins for up three doses (Patient not taking: Reported on 2024) 30 tablet 3     No current facility-administered medications on file prior to visit.       ALLERGIES  Latex    HISTORY  Past Medical History:   Diagnosis Date    Abnormal cardiovascular stress test 2016    Allergic     Arrhythmia     Cancer     cervical    Closed fracture of left fibula     Coronary artery disease     COVID-19 2021    Dizziness 2021    Enlarged heart     Graves disease 2016    status post ablation with hypothyroidism.      History of COVID-19 2021    Hx of mammogram 2016    Hx of staphylococcal infection     Hyperlipidemia     Hypertension 2016    Hyperthyroidism     Pre-syncope 2016    Reactive airway disease 2022    Snoring 2016    Supraventricular tachycardia 2016    SVT (supraventricular tachycardia)     status post ablation       Social History     Socioeconomic History    Marital status:    Tobacco Use    Smoking status: Former     Current packs/day: 0.00     Average packs/day: 0.5 packs/day for 29.0 years (14.5 ttl pk-yrs)     Types: Cigarettes     Start date: 1972     Quit date: 2001     Years since quittin.9     Passive exposure: Never    Smokeless tobacco: Never  "  Vaping Use    Vaping status: Never Used   Substance and Sexual Activity    Alcohol use: No    Drug use: No    Sexual activity: Not Currently       Family History   Problem Relation Age of Onset    Hypertension Mother     Breast cancer Mother     Bone cancer Mother     Cancer Mother     Heart attack Father     Heart disease Father         pacemaker    Hyperlipidemia Father     Hypertension Father     Diabetes Father         Passed away    Breast cancer Sister     Ovarian cancer Sister     Cancer Sister         sister passed away    Diabetes Paternal Grandmother        Review of Systems   Constitutional:  Positive for fatigue.   HENT: Negative.     Eyes:  Positive for visual disturbance (glasses).   Respiratory: Negative.     Cardiovascular:  Positive for palpitations. Negative for chest pain and leg swelling.   Gastrointestinal: Negative.    Endocrine: Negative.    Genitourinary: Negative.    Musculoskeletal:  Positive for arthralgias and myalgias.   Skin: Negative.    Allergic/Immunologic: Negative.    Neurological: Negative.    Hematological:  Bruises/bleeds easily.   Psychiatric/Behavioral:  Positive for sleep disturbance.        Objective     VITALS: /66 (BP Location: Left arm, Patient Position: Sitting)   Pulse 65   Ht 167.6 cm (65.98\")   Wt 88.8 kg (195 lb 12.8 oz)   LMP  (LMP Unknown)   SpO2 96%   BMI 31.62 kg/m²     LABS:   Lab Results (most recent)       None            IMAGING:   Mammo Screening Bilateral With CAD    Result Date: 1/9/2024  FINAL IMPRESSION: Stable mammogram. No findings suspicious for malignancy. Bi-RADS: ACR BI-RADS 1: Negative. RECOMMENDATIONS:  Annual screening mammography. A letter including results and recommendations was sent to the patient. Density notification was included for patients with pattern 3 or 4 breast tissue. Patient information was entered into a reminder system with a target due date for the next mammogram. At our facility, a [place] marker is positioned " over a visible skin lesion and a linear marker is used to indicate a scar. A triangular marker is placed on a self reported palpable finding. Note: Mammography does not detect approximately 10-15% of breast cancers. An annual clinical breast exam by the patient's breast care  physician and regular monthly self breast exams by the patient are integral parts of breast cancer screening, in addition to annual mammography. A normal mammogram does not completely exclude the presence of breast cancer, especially if there is an abnormal finding on physical exam. When clinically indicated, a biopsy should not be deferred because of a normal mammogram report.      EXAM:  Physical Exam  Vitals reviewed.   Constitutional:       Appearance: She is well-developed.   HENT:      Head: Normocephalic and atraumatic.   Eyes:      Pupils: Pupils are equal, round, and reactive to light.   Neck:      Thyroid: No thyromegaly.      Vascular: No JVD.   Cardiovascular:      Rate and Rhythm: Normal rate and regular rhythm.      Heart sounds: Normal heart sounds. No murmur heard.     No friction rub. No gallop.   Pulmonary:      Effort: Pulmonary effort is normal. No respiratory distress.      Breath sounds: Normal breath sounds. No stridor. No wheezing or rales.   Chest:      Chest wall: No tenderness.   Musculoskeletal:         General: No tenderness or deformity.      Cervical back: Neck supple.   Skin:     General: Skin is warm and dry.   Neurological:      Mental Status: She is alert and oriented to person, place, and time.      Cranial Nerves: No cranial nerve deficit.      Coordination: Coordination normal.         Procedure   Procedures        Assessment & Plan     Diagnoses and all orders for this visit:    1. Primary hypertension (Primary)  -     atenolol (TENORMIN) 25 MG tablet; Take 0.5 tablets by mouth Daily.  Dispense: 45 tablet; Refill: 3    2. Dyslipidemia  -     atorvastatin (LIPITOR) 40 MG tablet; Take 1 tablet by mouth  Daily.  Dispense: 90 tablet; Refill: 3  -     Lipid Panel; Future  -     Comprehensive Metabolic Panel; Future    3. Supraventricular tachycardia  -     atenolol (TENORMIN) 25 MG tablet; Take 0.5 tablets by mouth Daily.  Dispense: 45 tablet; Refill: 3    4. Palpitations  -     atenolol (TENORMIN) 25 MG tablet; Take 0.5 tablets by mouth Daily.  Dispense: 45 tablet; Refill: 3    5. Diastolic dysfunction    6. History of radiofrequency ablation (RFA) procedure for cardiac arrhythmia    7. PAC (premature atrial contraction)  -     atenolol (TENORMIN) 25 MG tablet; Take 0.5 tablets by mouth Daily.  Dispense: 45 tablet; Refill: 3    8. Cigarette nicotine dependence in remission    9. PVC (premature ventricular contraction)  -     atenolol (TENORMIN) 25 MG tablet; Take 0.5 tablets by mouth Daily.  Dispense: 45 tablet; Refill: 3    10. Bilateral carotid artery stenosis  -     atorvastatin (LIPITOR) 40 MG tablet; Take 1 tablet by mouth Daily.  Dispense: 90 tablet; Refill: 3    11. Other chest pain  -     nitroglycerin (NITROSTAT) 0.4 MG SL tablet; 1 under the tongue as needed for angina, may repeat q5mins for up three doses  Dispense: 30 tablet; Refill: 3    1.  Her blood pressure is really well-controlled we will continue current management  2.  She has very seldom palpitations and she is in sinus rhythm will continue the current management including aspirin and flecainide  3.  She is denying any chest pain or shortness of breath continue current management  4.  I do not have any recent lipid profile last lipid profile about a year ago showed low HDL and elevated triglycerides I will try to get labs prior to her next visit we talked about exercising  5.  She has not been smoking for many years now we will continue current management    Return in about 6 months (around 10/5/2024).      Advance Care Planning   ACP discussion was held with the patient during this visit. Patient has an advance directive (not in EMR), copy  requested.             MEDS ORDERED DURING VISIT:  New Medications Ordered This Visit   Medications    atenolol (TENORMIN) 25 MG tablet     Sig: Take 0.5 tablets by mouth Daily.     Dispense:  45 tablet     Refill:  3    atorvastatin (LIPITOR) 40 MG tablet     Sig: Take 1 tablet by mouth Daily.     Dispense:  90 tablet     Refill:  3    nitroglycerin (NITROSTAT) 0.4 MG SL tablet     Si under the tongue as needed for angina, may repeat q5mins for up three doses     Dispense:  30 tablet     Refill:  3       As always, Sonali Maldonado APRN  I appreciate very much the opportunity to participate in the cardiovascular care of your patients. Please do not hesitate to call me with any questions with regards to Justina Ch evaluation and management.         This document has been electronically signed by Andrew Milner MD  2024 11:15 EDT    This note is dictated utilizing voice recognition software.

## 2024-04-09 ENCOUNTER — OFFICE VISIT (OUTPATIENT)
Dept: NEUROLOGY | Facility: CLINIC | Age: 69
End: 2024-04-09
Payer: MEDICARE

## 2024-04-09 VITALS
TEMPERATURE: 98.2 F | OXYGEN SATURATION: 96 % | HEIGHT: 66 IN | HEART RATE: 60 BPM | BODY MASS INDEX: 31.34 KG/M2 | WEIGHT: 195 LBS | DIASTOLIC BLOOD PRESSURE: 72 MMHG | SYSTOLIC BLOOD PRESSURE: 110 MMHG

## 2024-04-09 DIAGNOSIS — M54.50 BILATERAL LOW BACK PAIN WITHOUT SCIATICA, UNSPECIFIED CHRONICITY: ICD-10-CM

## 2024-04-09 DIAGNOSIS — R29.898 WEAKNESS OF BOTH LOWER EXTREMITIES: Primary | ICD-10-CM

## 2024-04-09 DIAGNOSIS — R20.0 NUMBNESS OF TOES: ICD-10-CM

## 2024-04-09 PROCEDURE — 3078F DIAST BP <80 MM HG: CPT | Performed by: NURSE PRACTITIONER

## 2024-04-09 PROCEDURE — 1159F MED LIST DOCD IN RCRD: CPT | Performed by: NURSE PRACTITIONER

## 2024-04-09 PROCEDURE — 3074F SYST BP LT 130 MM HG: CPT | Performed by: NURSE PRACTITIONER

## 2024-04-09 PROCEDURE — 99214 OFFICE O/P EST MOD 30 MIN: CPT | Performed by: NURSE PRACTITIONER

## 2024-04-09 PROCEDURE — 1160F RVW MEDS BY RX/DR IN RCRD: CPT | Performed by: NURSE PRACTITIONER

## 2024-04-09 NOTE — PROGRESS NOTES
New Patient Office Visit      Patient Name: Justina Ch  : 1955   MRN: [id number]     Referring Physician: Sonali Maldonado APRN    Chief Complaint:    Chief Complaint   Patient presents with    Establish Care     BLE weakness; symptoms have worsened x 1 year; currently in PT for Right sided weakness s/p back injury; c/o numbness bilateral feet/toes; also reports that feet are always cold       History of Present Illness: Justina Ch is a 69 y.o. female who is here today to establish care with Neurology.  She reports she has had weakness in her bilateral legs from the knees down for at least a year but feels this is worsened.  She also states she has numbness in her bilateral great toes.  She has a history of low back pain and in the past has had injections in her L-spine.  She is currently in physical therapy and states that they are working on her right leg as it seems to be worse than the left as far as her weakness goes.  She reports she is unable to walk on her tiptoes due to weakness.  -MRI of the brain and MRA without contrast on 10/31/2023 shows atrophy and white matter changes without acute process  -X-ray of the L-spine on 10/25/2023 shows multilevel degenerative disc disease      Pertinent Medical History: Hypertension, dyslipidemia, history of SVT, anxiety  Subjective      Review of Systems:   Review of Systems   Musculoskeletal:  Positive for back pain.   Neurological:  Positive for weakness and numbness.       Past Medical History:   Past Medical History:   Diagnosis Date    Abnormal cardiovascular stress test 2016    Allergic     Arrhythmia     Cancer     cervical    Closed fracture of left fibula     Coronary artery disease     COVID-19 2021    Dizziness 2021    Enlarged heart     Graves disease 2016    status post ablation with hypothyroidism.      History of COVID-19 2021    Hx of mammogram 2016    Hx of staphylococcal  infection     Hyperlipidemia     Hypertension 2016    Hyperthyroidism     Pre-syncope 2016    Reactive airway disease 2022    Snoring 2016    Supraventricular tachycardia 2016    SVT (supraventricular tachycardia)     status post ablation       Past Surgical History:   Past Surgical History:   Procedure Laterality Date    ABLATION OF DYSRHYTHMIC FOCUS      for svt    CARDIAC CATHETERIZATION      CATARACT EXTRACTION, BILATERAL      COLONOSCOPY  2013    COLONOSCOPY N/A 10/10/2023    Procedure: COLONOSCOPY WITH POLYPECTOMY X2;  Surgeon: Pam Lema MD;  Location: [facility] ENDOSCOPY;  Service: Gastroenterology;  Laterality: N/A;    DILATATION AND CURETTAGE      of cervical stump    LASIK  2020    left eye     OTHER SURGICAL HISTORY      facial surgery- basal cell cancer removed       Family History:   Family History   Problem Relation Age of Onset    Hypertension Mother     Breast cancer Mother     Bone cancer Mother     Cancer Mother     Heart attack Father     Heart disease Father         pacemaker    Hyperlipidemia Father     Hypertension Father     Diabetes Father         Passed away    Breast cancer Sister     Ovarian cancer Sister     Cancer Sister         sister passed away    Diabetes Paternal Grandmother        Social History:   Social History     Socioeconomic History    Marital status:    Tobacco Use    Smoking status: Former     Current packs/day: 0.00     Average packs/day: 0.5 packs/day for 29.0 years (14.5 ttl pk-yrs)     Types: Cigarettes     Start date: 1972     Quit date: 2001     Years since quittin.9     Passive exposure: Never    Smokeless tobacco: Never   Vaping Use    Vaping status: Never Used   Substance and Sexual Activity    Alcohol use: No    Drug use: No    Sexual activity: Not Currently       Medications:     Current Outpatient Medications:     albuterol sulfate  (90 Base) MCG/ACT inhaler, Inhale 2 puffs Every 4 (Four)  "Hours As Needed for Wheezing., Disp: 8.5 g, Rfl: 1    amoxicillin-clavulanate (AUGMENTIN) 875-125 MG per tablet, Take 1 tablet by mouth 2 (Two) Times a Day for 7 days., Disp: 14 tablet, Rfl: 0    aspirin 81 MG tablet, Take 1 tablet by mouth Daily., Disp: , Rfl:     atenolol (TENORMIN) 25 MG tablet, Take 0.5 tablets by mouth Daily., Disp: 45 tablet, Rfl: 3    atorvastatin (LIPITOR) 40 MG tablet, Take 1 tablet by mouth Daily., Disp: 90 tablet, Rfl: 3    cetirizine (zyrTEC) 10 MG tablet, TAKE 1 TABLET BY MOUTH EVERY DAY, Disp: 90 tablet, Rfl: 3    flecainide (TAMBOCOR) 100 MG tablet, Take 1 tablet by mouth 2 (Two) Times a Day., Disp: 180 tablet, Rfl: 3    Fluticasone Furoate-Vilanterol (Breo Ellipta) 200-25 MCG/ACT inhaler, Inhale 1 puff Daily., Disp: 60 each, Rfl: 5    levothyroxine (SYNTHROID, LEVOTHROID) 150 MCG tablet, Take 1 tablet by mouth Every Morning., Disp: 90 tablet, Rfl: 3    meloxicam (MOBIC) 15 MG tablet, Take 1 tablet by mouth Daily., Disp: , Rfl:     nitroglycerin (NITROSTAT) 0.4 MG SL tablet, 1 under the tongue as needed for angina, may repeat q5mins for up three doses, Disp: 30 tablet, Rfl: 3    Allergies:   Allergies   Allergen Reactions    Latex Rash       Objective     Physical Exam:  Vital Signs:   Vitals:    04/09/24 0946   BP: 110/72   Pulse: 60   Temp: 98.2 °F (36.8 °C)   SpO2: 96%   Weight: 88.5 kg (195 lb)   Height: 167.6 cm (66\")   PainSc:   7   PainLoc: Knee     Body mass index is 31.47 kg/m².     Physical Exam  Constitutional:       Appearance: Normal appearance.   HENT:      Head: Normocephalic.   Eyes:      Extraocular Movements: EOM normal.      Conjunctiva/sclera: Conjunctivae normal.      Pupils: Pupils are equal, round, and reactive to light.   Pulmonary:      Effort: Pulmonary effort is normal.   Musculoskeletal:         General: Normal range of motion.   Skin:     General: Skin is warm and dry.   Neurological:      General: No focal deficit present.      Mental Status: She is alert " and oriented to person, place, and time.      Cranial Nerves: Cranial nerves 2-12 are intact. No dysarthria.      Motor: Motor function is intact. No tremor or pronator drift.      Coordination: Coordination is intact. Romberg sign negative. Finger-Nose-Finger Test and Romberg Test normal.      Gait: Gait is intact. Tandem walk normal.      Deep Tendon Reflexes:      Reflex Scores:       Tricep reflexes are 2+ on the right side and 2+ on the left side.       Bicep reflexes are 2+ on the right side and 2+ on the left side.       Patellar reflexes are 2+ on the right side and 2+ on the left side.  Psychiatric:         Attention and Perception: Attention normal.         Mood and Affect: Mood and affect normal.         Speech: Speech normal.         Behavior: Behavior normal. Behavior is cooperative.         Thought Content: Thought content normal.         Cognition and Memory: Cognition normal.         Judgment: Judgment normal.         Neurologic Exam     Mental Status   Oriented to person, place, and time.   Attention: normal. Concentration: normal.   Speech: speech is normal   Level of consciousness: alert  Knowledge: good.     Cranial Nerves   Cranial nerves II through XII intact.     CN III, IV, VI   Pupils are equal, round, and reactive to light.  Extraocular motions are normal.   Right pupil: Size: 5 mm. Shape: regular. Reactivity: brisk.   Left pupil: Size: 5 mm. Shape: regular. Reactivity: brisk.   Nystagmus: none     CN VIII   Hearing: intact    CN IX, X   Palate: symmetric    CN XI   Right sternocleidomastoid strength: normal  Left sternocleidomastoid strength: normal  Right trapezius strength: normal  Left trapezius strength: normal    CN XII   Tongue: not atrophic  Fasciculations: absent  Tongue deviation: none    Motor Exam   Muscle bulk: normal  Overall muscle tone: normal  Right arm pronator drift: absent  Left arm pronator drift: absent    Strength   Right deltoid: 5/5  Left deltoid: 5/5  Right biceps:  "5/5  Left biceps: 5/5  Right triceps: 5/5  Left triceps: 5/5  Right quadriceps: 5/5  Left quadriceps: 5/5  Right hamstrin/5  Left hamstrin/5  Right glutei: 5/5  Left glutei: 5/5  Right anterior tibial: 5/5  Left anterior tibial: 5/5  Right posterior tibial: 5/5  Left posterior tibial: 5/5  Right peroneal: 4/5  Left peroneal: 5/5  Right gastroc: 4/5  Left gastroc: 5/5    Sensory Exam   Light touch normal.     Gait, Coordination, and Reflexes     Gait  Gait: normal    Coordination   Romberg: negative  Finger to nose coordination: normal  Tandem walking coordination: normal    Tremor   Resting tremor: absent  Intention tremor: absent  Action tremor: absent    Reflexes   Right biceps: 2+  Left biceps: 2+  Right triceps: 2+  Left triceps: 2+  Right patellar: 2+  Left patellar: 2+She is able to easily walk on her heels but unable to walk on her toes due to stated weakness.  She does not try to attempt this as she says \"I know I cannot do it\"       Assessment / Plan      Assessment/Plan:   Diagnoses and all orders for this visit:    1. Weakness of both lower extremities (Primary)  -     MRI Lumbar Spine Without Contrast; Future    2. Numbness of toes  -     MRI Lumbar Spine Without Contrast; Future    3. Bilateral low back pain without sciatica, unspecified chronicity  -     MRI Lumbar Spine Without Contrast; Future       This is a pleasant 69-year-old female patient here to establish care for low back pain and weakness in her bilateral lower extremities from the knees down with some numbness of her great toes.  She had x-ray of lumbar spine about 6 months ago that shows degenerative disc disease.  MRI of the L-spine without contrast has been ordered to rule out any further abnormalities that could be contributing to her symptoms.  Also discussed EMG/NCV and this may be needed in the future after we review her MRI results.   Patient will call in the interim if she has any questions or concerns or changes.    Follow " Up:   Return in about 2 months (around 6/9/2024).    JOSE Pettit, [facility] Neurology and Sleep Medicine

## 2024-04-12 NOTE — TELEPHONE ENCOUNTER
Attempted to contact patient. There was no answer, left message for patient regarding her results of the cologuard that was received yesterday.    The results were negative, but I did inform the patient that I do have to have the provider review that results once they are scanned into the chart.   Occupational therapy to evaluate and treat.  Ambulate with assistance.

## 2024-04-15 ENCOUNTER — PATIENT ROUNDING (BHMG ONLY) (OUTPATIENT)
Dept: NEUROLOGY | Facility: CLINIC | Age: 69
End: 2024-04-15
Payer: MEDICARE

## 2024-04-23 ENCOUNTER — OFFICE VISIT (OUTPATIENT)
Dept: FAMILY MEDICINE CLINIC | Facility: CLINIC | Age: 69
End: 2024-04-23
Payer: MEDICARE

## 2024-04-23 VITALS
SYSTOLIC BLOOD PRESSURE: 122 MMHG | RESPIRATION RATE: 16 BRPM | HEART RATE: 71 BPM | DIASTOLIC BLOOD PRESSURE: 80 MMHG | OXYGEN SATURATION: 96 % | WEIGHT: 195 LBS | TEMPERATURE: 97.9 F | HEIGHT: 66 IN | BODY MASS INDEX: 31.34 KG/M2

## 2024-04-23 DIAGNOSIS — J30.9 CHRONIC ALLERGIC RHINITIS: ICD-10-CM

## 2024-04-23 DIAGNOSIS — J02.9 ACUTE PHARYNGITIS, UNSPECIFIED ETIOLOGY: Primary | ICD-10-CM

## 2024-04-23 DIAGNOSIS — J06.9 VIRAL UPPER RESPIRATORY TRACT INFECTION: ICD-10-CM

## 2024-04-23 LAB
EXPIRATION DATE: NORMAL
INTERNAL CONTROL: NORMAL
Lab: NORMAL
S PYO AG THROAT QL: NEGATIVE

## 2024-04-23 PROCEDURE — 87880 STREP A ASSAY W/OPTIC: CPT | Performed by: FAMILY MEDICINE

## 2024-04-23 PROCEDURE — 3074F SYST BP LT 130 MM HG: CPT | Performed by: FAMILY MEDICINE

## 2024-04-23 PROCEDURE — 1160F RVW MEDS BY RX/DR IN RCRD: CPT | Performed by: FAMILY MEDICINE

## 2024-04-23 PROCEDURE — 1159F MED LIST DOCD IN RCRD: CPT | Performed by: FAMILY MEDICINE

## 2024-04-23 PROCEDURE — 99214 OFFICE O/P EST MOD 30 MIN: CPT | Performed by: FAMILY MEDICINE

## 2024-04-23 PROCEDURE — 3079F DIAST BP 80-89 MM HG: CPT | Performed by: FAMILY MEDICINE

## 2024-04-23 RX ORDER — AMMONIUM LACTATE 12 G/100G
LOTION TOPICAL
COMMUNITY
Start: 2024-04-15

## 2024-04-23 RX ORDER — METHYLPREDNISOLONE 4 MG/1
TABLET ORAL
Qty: 21 TABLET | Refills: 0 | Status: SHIPPED | OUTPATIENT
Start: 2024-04-23 | End: 2024-04-28

## 2024-04-23 RX ORDER — IPRATROPIUM BROMIDE 21 UG/1
2 SPRAY, METERED NASAL EVERY 12 HOURS
Qty: 30 ML | Refills: 1 | Status: SHIPPED | OUTPATIENT
Start: 2024-04-23

## 2024-04-23 NOTE — PROGRESS NOTES
"    Office Note     Name: Justina Ch  : 1955   MRN: [id number]     Chief Complaint:  Sore Throat (Pt states her ears are plugged and blister like areas on her throat )    Subjective     History of Present Illness:  Justina Ch is a 69 y.o. female who presents today for acute pharyngitis.     Sore Throat   This is a new problem. The current episode started in the past 7 days. The problem has been worse.   Pain is worse on both side(s). There has been no fever. The pain is moderate. Associated symptoms include congestion, coughing and swollen glands. Pertinent negatives include no abdominal pain, diarrhea, drooling, ear pain, headaches, hoarse voice, neck pain, shortness of breath, trouble swallowing or vomiting. She has had exposure to None known. She has tried cool liquids for the symptoms. The treatment provided mild relief.        I have reviewed the following portions of the patient's history and these were updated and discussed with the patient as appropriate: past family history, past medical history, past social history, past surgical history, and problem list.     Objective     Vital Signs  /80   Pulse 71   Temp 97.9 °F (36.6 °C) (Temporal)   Resp 16   Ht 167.6 cm (66\")   Wt 88.5 kg (195 lb)   SpO2 96%   BMI 31.47 kg/m²   Estimated body mass index is 31.47 kg/m² as calculated from the following:    Height as of this encounter: 167.6 cm (66\").    Weight as of this encounter: 88.5 kg (195 lb).    Physical Exam  Vitals reviewed.   Constitutional:       General: She is not in acute distress.     Appearance: Normal appearance. She is not ill-appearing or toxic-appearing.   HENT:      Head: Normocephalic.      Right Ear: Tympanic membrane, ear canal and external ear normal.      Left Ear: Tympanic membrane, ear canal and external ear normal.      Nose: Congestion present.      Mouth/Throat:      Mouth: Mucous membranes are moist.      Pharynx: Posterior oropharyngeal erythema " present. No oropharyngeal exudate.      Comments: PND noted  Eyes:      Extraocular Movements: Extraocular movements intact.   Cardiovascular:      Rate and Rhythm: Normal rate and regular rhythm.      Heart sounds: Normal heart sounds. No murmur heard.  Pulmonary:      Effort: Pulmonary effort is normal. No respiratory distress.      Breath sounds: Normal breath sounds. No stridor. No wheezing, rhonchi or rales.   Chest:      Chest wall: No tenderness.   Musculoskeletal:         General: Normal range of motion.      Cervical back: Normal range of motion and neck supple. No rigidity or tenderness.   Lymphadenopathy:      Cervical: Cervical adenopathy present.   Skin:     General: Skin is warm and dry.   Neurological:      Mental Status: She is alert and oriented to person, place, and time.   Psychiatric:         Mood and Affect: Mood normal.            Assessment and Plan     Diagnoses and all orders for this visit:    1. Acute pharyngitis, unspecified etiology (Primary)  -     POCT rapid strep A  -     methylPREDNISolone (MEDROL) 4 MG dose pack; Take 6 tablets by mouth Daily for 1 day, THEN 5 tablets Daily for 1 day, THEN 4 tablets Daily for 1 day, THEN 3 tablets Daily for 1 day, THEN 2 tablets Daily for 1 day, THEN 1 tablet Daily for 1 day.  Dispense: 21 tablet; Refill: 0    2. Chronic allergic rhinitis  -     ipratropium (ATROVENT) 0.03 % nasal spray; 2 sprays into the nostril(s) as directed by provider Every 12 (Twelve) Hours.  Dispense: 30 mL; Refill: 1    3. Viral upper respiratory tract infection    POC rapid test is negative  Sxs likely 2/2 viral infection vs PND from allergic rhinitis  Will start short course of oral steroid taper  Patient may also utilize OTC lozenges and salt water gargles  Start ipratropium nasal spray  Start fluticasone nasal spray 2 sprays in each nostril daily. May obtain this OTC  Start oral antihistamine such as fexofenadine, cetirizine, loratadine.  May obtain this OTC  Symptomatic  treatment  Increase fluid intake        Discussion Summary:     Discussed plan of care in detail with patient today. Patient was encouraged to keep me informed of any acute changes, lack of improvement, or any new concerning symptoms.  Patient is also aware of reasons to seek emergent care. Patient verbalized understanding and agrees with plan of care.    This visit was billed based on time.  I spent 30 minutes caring for Justina Ch on this date of service. This time includes time spent by me in the following activities:preparing for the visit, reviewing tests, obtaining and/or reviewing a separately obtained history, performing a medically appropriate examination and/or evaluation , counseling and educating the patient/family/caregiver, ordering medications, tests, or procedures, referring and communicating with other health care professionals , documenting information in the medical record, independently interpreting results and communicating that information with the patient/family/caregiver, and care coordination.    Follow Up  Return if symptoms worsen or fail to improve.    Please note that portions of this note may have been completed with a voice recognition program.     Wilder Brady MD, MPH  [facility] [place] RACHELLE Rae

## 2024-05-15 DIAGNOSIS — I65.23 BILATERAL CAROTID ARTERY STENOSIS: ICD-10-CM

## 2024-05-15 DIAGNOSIS — E78.5 DYSLIPIDEMIA: ICD-10-CM

## 2024-05-15 RX ORDER — ATORVASTATIN CALCIUM 40 MG/1
40 TABLET, FILM COATED ORAL DAILY
Qty: 90 TABLET | Refills: 3 | Status: SHIPPED | OUTPATIENT
Start: 2024-05-15

## 2024-05-16 ENCOUNTER — HOSPITAL ENCOUNTER (OUTPATIENT)
Dept: MRI IMAGING | Facility: HOSPITAL | Age: 69
Discharge: HOME | End: 2024-05-16
Admitting: NURSE PRACTITIONER
Payer: MEDICARE

## 2024-05-16 DIAGNOSIS — R20.0 NUMBNESS OF TOES: ICD-10-CM

## 2024-05-16 DIAGNOSIS — M54.50 BILATERAL LOW BACK PAIN WITHOUT SCIATICA, UNSPECIFIED CHRONICITY: ICD-10-CM

## 2024-05-16 DIAGNOSIS — R29.898 WEAKNESS OF BOTH LOWER EXTREMITIES: ICD-10-CM

## 2024-05-16 PROCEDURE — 72148 MRI LUMBAR SPINE W/O DYE: CPT

## 2024-05-23 ENCOUNTER — TELEPHONE (OUTPATIENT)
Dept: NEUROLOGY | Facility: CLINIC | Age: 69
End: 2024-05-23

## 2024-05-23 DIAGNOSIS — M54.50 BILATERAL LOW BACK PAIN WITHOUT SCIATICA, UNSPECIFIED CHRONICITY: ICD-10-CM

## 2024-05-23 DIAGNOSIS — R29.898 WEAKNESS OF BOTH LOWER EXTREMITIES: Primary | ICD-10-CM

## 2024-05-23 DIAGNOSIS — M48.00 CENTRAL STENOSIS OF SPINAL CANAL: ICD-10-CM

## 2024-05-23 NOTE — TELEPHONE ENCOUNTER
Provider: ELOY    Caller: FABIENNE    Relationship to Patient: SELF    Phone Number: [phone]    Reason for Call: PATIENT RECEIVED MRI RESULTS AND WAS ADVISE THAT SHE COULD GO TO A NEUROSURGEON OF HER CHOOSING. PATIENT IS REQUESTING FOR RECOMMENDATIONS ON WHO TO BE  REFERRED TO.

## 2024-06-01 DIAGNOSIS — J30.9 CHRONIC ALLERGIC RHINITIS: ICD-10-CM

## 2024-06-03 RX ORDER — IPRATROPIUM BROMIDE 21 UG/1
SPRAY, METERED NASAL
Qty: 30 ML | Refills: 1 | Status: SHIPPED | OUTPATIENT
Start: 2024-06-03

## 2024-06-11 ENCOUNTER — OFFICE VISIT (OUTPATIENT)
Dept: NEUROSURGERY | Facility: CLINIC | Age: 69
End: 2024-06-11
Payer: MEDICARE

## 2024-06-11 VITALS — HEIGHT: 66 IN | TEMPERATURE: 97.7 F | WEIGHT: 190 LBS | RESPIRATION RATE: 17 BRPM | BODY MASS INDEX: 30.53 KG/M2

## 2024-06-11 DIAGNOSIS — R29.898 WEAKNESS OF BOTH LEGS: ICD-10-CM

## 2024-06-11 DIAGNOSIS — M48.062 SPINAL STENOSIS OF LUMBAR REGION WITH NEUROGENIC CLAUDICATION: Primary | ICD-10-CM

## 2024-06-11 DIAGNOSIS — M54.9 MECHANICAL BACK PAIN: ICD-10-CM

## 2024-06-11 DIAGNOSIS — M51.36 DDD (DEGENERATIVE DISC DISEASE), LUMBAR: ICD-10-CM

## 2024-06-11 PROCEDURE — 1159F MED LIST DOCD IN RCRD: CPT | Performed by: NEUROLOGICAL SURGERY

## 2024-06-11 PROCEDURE — 99203 OFFICE O/P NEW LOW 30 MIN: CPT | Performed by: NEUROLOGICAL SURGERY

## 2024-06-11 PROCEDURE — 1160F RVW MEDS BY RX/DR IN RCRD: CPT | Performed by: NEUROLOGICAL SURGERY

## 2024-06-11 NOTE — PROGRESS NOTES
Patient: Justina Ch  : 1955    Primary Care Provider: Sonali Maldonado APRN    Requesting Provider: As above        History    Chief Complaint: Low back and lower extremity pain.    History of Present Illness: Ms. Ch ([facility]) is a 69-year-old woman who has a 15-year history of episodic low back pain.  Over the last couple years her pain has increased.  She has good days and bad days.  Intermittently she has pain extending to her legs, right greater than left.  If she walks too long then her legs get heavy and she has diminished strength.  She has no numbness.  Remotely she had epidural injections.  Her symptoms are also worse with sitting or lying on her side.  She does better sleeping on her stomach.  Ibuprofen is helpful.  She has been doing physical therapy for about 3 months.  She has had problems with plantar flexing her feet and getting up on her tiptoes since last year.    Review of Systems   Constitutional:  Negative for activity change, appetite change, chills, diaphoresis, fatigue, fever and unexpected weight change.   HENT:  Negative for congestion, dental problem, drooling, ear discharge, ear pain, facial swelling, hearing loss, mouth sores, nosebleeds, postnasal drip, rhinorrhea, sinus pressure, sneezing, sore throat, tinnitus, trouble swallowing and voice change.    Eyes:  Negative for photophobia, pain, discharge, redness, itching and visual disturbance.   Respiratory:  Negative for apnea, cough, choking, chest tightness, shortness of breath, wheezing and stridor.    Cardiovascular:  Negative for chest pain, palpitations and leg swelling.   Gastrointestinal:  Negative for abdominal distention, abdominal pain, anal bleeding, blood in stool, constipation, diarrhea, nausea, rectal pain and vomiting.   Endocrine: Negative for cold intolerance, heat intolerance, polydipsia, polyphagia and polyuria.   Genitourinary:  Negative for decreased urine volume, difficulty urinating, dysuria,  "enuresis, flank pain, frequency, genital sores, hematuria and urgency.   Musculoskeletal:  Positive for myalgias. Negative for back pain, gait problem, joint swelling, neck pain and neck stiffness.   Skin:  Negative for color change, pallor, rash and wound.   Allergic/Immunologic: Negative for environmental allergies, food allergies and immunocompromised state.   Neurological:  Negative for dizziness, tremors, seizures, syncope, facial asymmetry, speech difficulty, weakness, light-headedness, numbness and headaches.   Hematological:  Negative for adenopathy. Does not bruise/bleed easily.   Psychiatric/Behavioral:  Negative for agitation, behavioral problems, confusion, decreased concentration, dysphoric mood, hallucinations, self-injury, sleep disturbance and suicidal ideas. The patient is not nervous/anxious and is not hyperactive.    All other systems reviewed and are negative.      The patient's past medical history, past surgical history, family history, and social history have been reviewed at length in the electronic medical record.      Physical Exam:   Temp 97.7 °F (36.5 °C) (Infrared)   Resp 17   Ht 167.6 cm (66\")   Wt 86.2 kg (190 lb)   LMP  (LMP Unknown)   BMI 30.67 kg/m²   CONSTITUTIONAL: Patient is well-nourished, pleasant and appears stated age.  MUSCULOSKELETAL:  Straight leg raising is negative.  's Sign is negative.  ROM in the low back is normal.  Tenderness in the back to palpation is not observed.  NEUROLOGICAL:  Orientation, memory, attention span, language function, and cognition have been examined and are intact.  Strength is intact in the lower extremities to direct testing.  Strength is intact in all muscle groups tested in her lower extremities but despite that she is unable to get up on her tiptoes while standing.  Muscle tone is normal throughout.  Station and gait are normal.  Sensation is intact to light touch testing throughout.  Deep tendon reflexes are difficult to elicit " throughout.  Coordination is intact.      Medical Decision Making    Data Review:   (All imaging studies were personally reviewed unless stated otherwise)  MRI of the lumbar spine dated 5/16/2024 demonstrates a transitional segment.  There is generous grade stenosis at L3-4 and L4-5.  There is some diffuse degenerative disc disease and facet arthropathy.    Diagnosis:   1.  Mechanical low back pain.  2.  Lumbar stenosis with neurogenic claudication.    Treatment Options:   As is often the case she has a mixed syndrome of symptomatic issues.  I have referred her for electrodiagnostic studies of both lower extremities.  She will follow-up thereafter.  The lumbar stenosis issues could be addressed surgically.  I do not have a solution for her degenerative disc and degenerative joint disease related symptoms.      Scribed for Raheem Bedoya MD by Marylu Young CMA on 6/11/2024 11:35 EDT       I, Dr. Bedoya, personally performed the services described in the documentation, as scribed in my presence, and it is both accurate and complete.

## 2024-07-01 DIAGNOSIS — I49.3 PVC (PREMATURE VENTRICULAR CONTRACTION): ICD-10-CM

## 2024-07-01 RX ORDER — FLECAINIDE ACETATE 100 MG/1
100 TABLET ORAL 2 TIMES DAILY
Qty: 180 TABLET | Refills: 3 | Status: SHIPPED | OUTPATIENT
Start: 2024-07-01

## 2024-07-09 ENCOUNTER — OFFICE VISIT (OUTPATIENT)
Dept: NEUROSURGERY | Facility: CLINIC | Age: 69
End: 2024-07-09
Payer: MEDICARE

## 2024-07-09 VITALS — TEMPERATURE: 97 F | WEIGHT: 190 LBS | RESPIRATION RATE: 17 BRPM | HEIGHT: 66 IN | BODY MASS INDEX: 30.53 KG/M2

## 2024-07-09 DIAGNOSIS — M48.062 SPINAL STENOSIS OF LUMBAR REGION WITH NEUROGENIC CLAUDICATION: ICD-10-CM

## 2024-07-09 DIAGNOSIS — M54.9 MECHANICAL BACK PAIN: Primary | ICD-10-CM

## 2024-07-09 PROCEDURE — 99214 OFFICE O/P EST MOD 30 MIN: CPT | Performed by: NEUROLOGICAL SURGERY

## 2024-07-09 NOTE — PROGRESS NOTES
Patient: Justina Ch  : 1955    Primary Care Provider: Sonali Maldonado APRN    Requesting Provider: As above        History    Chief Complaint: Low back and lower extremity pain.    History of Present Illness: [facility] is a 69-year-old woman who has a 15-year history of episodic low back pain.  Over the last couple years her pain has increased.  She has good days and bad days.  Intermittently she has pain extending to her legs, right greater than left.  If she walks too long then her legs get heavy and she has diminished strength.  She has no numbness.  Remotely she had epidural injections.  Her symptoms are also worse with sitting or lying on her side.  She does better sleeping on her stomach.  Ibuprofen is helpful.  She has been doing physical therapy for about 3 months.  She has had problems with plantar flexing her feet and getting up on her tiptoes since last year.  She continues to do physical therapy.  She reports no change in her symptoms.    Review of Systems   Constitutional:  Negative for activity change, appetite change, chills, diaphoresis, fatigue, fever and unexpected weight change.   HENT:  Negative for congestion, dental problem, drooling, ear discharge, ear pain, facial swelling, hearing loss, mouth sores, nosebleeds, postnasal drip, rhinorrhea, sinus pressure, sneezing, sore throat, tinnitus, trouble swallowing and voice change.    Eyes:  Negative for photophobia, pain, discharge, redness, itching and visual disturbance.   Respiratory:  Negative for apnea, cough, choking, chest tightness, shortness of breath, wheezing and stridor.    Cardiovascular:  Negative for chest pain, palpitations and leg swelling.   Gastrointestinal:  Negative for abdominal distention, abdominal pain, anal bleeding, blood in stool, constipation, diarrhea, nausea, rectal pain and vomiting.   Endocrine: Negative for cold intolerance, heat intolerance, polydipsia, polyphagia and polyuria.  "  Genitourinary:  Negative for decreased urine volume, difficulty urinating, dysuria, enuresis, flank pain, frequency, genital sores, hematuria and urgency.   Musculoskeletal:  Positive for back pain. Negative for arthralgias, gait problem, joint swelling, myalgias, neck pain and neck stiffness.   Skin:  Negative for color change, pallor, rash and wound.   Allergic/Immunologic: Negative for environmental allergies, food allergies and immunocompromised state.   Neurological:  Negative for dizziness, tremors, seizures, syncope, facial asymmetry, speech difficulty, weakness, light-headedness, numbness and headaches.   Hematological:  Negative for adenopathy. Does not bruise/bleed easily.   Psychiatric/Behavioral:  Negative for agitation, behavioral problems, confusion, decreased concentration, dysphoric mood, hallucinations, self-injury, sleep disturbance and suicidal ideas. The patient is not nervous/anxious and is not hyperactive.    All other systems reviewed and are negative.      The patient's past medical history, past surgical history, family history, and social history have been reviewed at length in the electronic medical record.      Physical Exam:   Temp 97 °F (36.1 °C) (Infrared)   Resp 17   Ht 167.6 cm (66\")   Wt 86.2 kg (190 lb)   LMP  (LMP Unknown)   BMI 30.67 kg/m²   Deferred    Medical Decision Making    Data Review:   (All imaging studies were personally reviewed unless stated otherwise)  MRI of the lumbar spine dated 5/16/2024 demonstrates a transitional segment.  There is generous grade stenosis at L3-4 and L4-5.  There is some diffuse degenerative disc disease and facet arthropathy.-    Electrodiagnostic studies performed by Dr. Reardon suggest mild chronic bilateral L5/S1 radiculopathies.       Diagnosis:   1.  Mechanical low back pain.  2.  Lumbar stenosis with neurogenic claudication.  3.  Chronic radiculopathy.  4.  Lumbar degenerative disc disease.  5.  Lumbar degenerative joint " disease.    Treatment Options:   I discussed all of these findings with the patient.  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  Surgical intervention would entail two-level lumbar laminectomy and would help with some of her claudication symptoms.  It would not be a panacea for some of her mechanical low back pain.  Trying facet blocks/rhizotomies would be another consideration.  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  Logistically setting up the surgery could prove difficult.  If she would like to set up the surgery and can make arrangements for her brother then she will contact my office.  Formal cardiac clearance would be necessary should she elect to proceed with surgery.  I have discussed what the surgery would entail as well as the potential risks, complications, limitations.      Scribed for Raheem Bedoya MD by Marylu Young CMA on 7/9/2024 08:52 EDT       I, Dr. Bedoya, personally performed the services described in the documentation, as scribed in my presence, and it is both accurate and complete.

## 2024-07-29 ENCOUNTER — OFFICE VISIT (OUTPATIENT)
Dept: NEUROLOGY | Facility: CLINIC | Age: 69
End: 2024-07-29
Payer: MEDICARE

## 2024-07-29 VITALS
WEIGHT: 192 LBS | TEMPERATURE: 97.8 F | DIASTOLIC BLOOD PRESSURE: 80 MMHG | HEART RATE: 60 BPM | OXYGEN SATURATION: 97 % | BODY MASS INDEX: 30.86 KG/M2 | SYSTOLIC BLOOD PRESSURE: 126 MMHG | HEIGHT: 66 IN

## 2024-07-29 DIAGNOSIS — R20.0 NUMBNESS AND TINGLING OF BOTH LOWER EXTREMITIES: ICD-10-CM

## 2024-07-29 DIAGNOSIS — R20.2 NUMBNESS AND TINGLING OF BOTH LOWER EXTREMITIES: ICD-10-CM

## 2024-07-29 DIAGNOSIS — M48.00 CENTRAL STENOSIS OF SPINAL CANAL: Primary | ICD-10-CM

## 2024-07-29 PROCEDURE — 3074F SYST BP LT 130 MM HG: CPT | Performed by: NURSE PRACTITIONER

## 2024-07-29 PROCEDURE — 1160F RVW MEDS BY RX/DR IN RCRD: CPT | Performed by: NURSE PRACTITIONER

## 2024-07-29 PROCEDURE — 3079F DIAST BP 80-89 MM HG: CPT | Performed by: NURSE PRACTITIONER

## 2024-07-29 PROCEDURE — 1159F MED LIST DOCD IN RCRD: CPT | Performed by: NURSE PRACTITIONER

## 2024-07-29 PROCEDURE — 99214 OFFICE O/P EST MOD 30 MIN: CPT | Performed by: NURSE PRACTITIONER

## 2024-07-29 RX ORDER — GABAPENTIN 100 MG/1
100 CAPSULE ORAL 3 TIMES DAILY
Qty: 90 CAPSULE | Refills: 2 | Status: SHIPPED | OUTPATIENT
Start: 2024-07-29

## 2024-07-29 NOTE — PROGRESS NOTES
Follow Up Office Visit      Patient Name: Justina Ch  : 1955   MRN: [id number]     Chief Complaint:    Chief Complaint   Patient presents with    Follow-up     BLE weakness; patient has been going to PT over the last 3 months and symptoms have worsened; patient reports difficulty walking and does occasionally have SOA       History of Present Illness: Justina Ch is a 69 y.o. female who is here today to follow up with low back pain and weakness in her LE's. 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 Also was suggested to have RFTC's. She has ongoing tingling, numbness and pain in her LE's as weakness, Rt > Lt; also reports it feels like bugs crawling on her at times.  -She says she had dry needling and says this did not last long but helped initially. She also says she had EMG 2024 by Dr Reardon which shows consistency with L5/S1 radiculopathies.   She is currently in PT for her back.  She is going twice a week.   -  MRI L-spine 2024:  Impression:   Advanced degenerative changes resulting in significant  central canal stenosis at L4-5 and L3-4.    She also notes she has been having some occasional SOA and has appt with cardiologist.     -MRI of the brain and MRA without contrast on 10/31/2023 shows atrophy and white matter changes without acute process  -X-ray of the L-spine on 10/25/2023 shows multilevel degenerative disc disease      Pertinent Medical History: Hypertension, dyslipidemia, history of SVT, anxiety      Subjective      Review of Systems:   Review of Systems   Musculoskeletal:  Positive for back pain.   Neurological:  Positive for weakness and numbness.       I have reviewed and the following portions of the patient's history were updated as appropriate: past family history, past medical history, past social history, past  "surgical history and problem list.    Medications:     Current Outpatient Medications:     albuterol sulfate  (90 Base) MCG/ACT inhaler, Inhale 2 puffs Every 4 (Four) Hours As Needed for Wheezing., Disp: 8.5 g, Rfl: 1    ammonium lactate (LAC-HYDRIN) 12 % lotion, APPLY A THIN LAYER TOPICALLY TO THE AFFECTED AREA DAILY, Disp: , Rfl:     aspirin 81 MG tablet, Take 1 tablet by mouth Daily., Disp: , Rfl:     atenolol (TENORMIN) 25 MG tablet, Take 0.5 tablets by mouth Daily., Disp: 45 tablet, Rfl: 3    atorvastatin (LIPITOR) 40 MG tablet, TAKE 1 TABLET BY MOUTH DAILY, Disp: 90 tablet, Rfl: 3    cetirizine (zyrTEC) 10 MG tablet, TAKE 1 TABLET BY MOUTH EVERY DAY, Disp: 90 tablet, Rfl: 3    flecainide (TAMBOCOR) 100 MG tablet, TAKE 1 TABLET BY MOUTH TWICE DAILY, Disp: 180 tablet, Rfl: 3    Fluticasone Furoate-Vilanterol (Breo Ellipta) 200-25 MCG/ACT inhaler, Inhale 1 puff Daily., Disp: 60 each, Rfl: 5    ipratropium (ATROVENT) 0.03 % nasal spray, ADMINSTER 2 SPRAYS INTO THE BOTH NOSTRILS EVERY 12 HOURS, Disp: 30 mL, Rfl: 1    levothyroxine (SYNTHROID, LEVOTHROID) 150 MCG tablet, Take 1 tablet by mouth Every Morning., Disp: 90 tablet, Rfl: 3    meloxicam (MOBIC) 15 MG tablet, Take 1 tablet by mouth Daily., Disp: , Rfl:     nitroglycerin (NITROSTAT) 0.4 MG SL tablet, 1 under the tongue as needed for angina, may repeat q5mins for up three doses, Disp: 30 tablet, Rfl: 3    gabapentin (Neurontin) 100 MG capsule, Take 1 capsule by mouth 3 (Three) Times a Day., Disp: 90 capsule, Rfl: 2    Allergies:   Allergies   Allergen Reactions    Latex Rash       Objective     Physical Exam:  Vital Signs:   Vitals:    07/29/24 1342   BP: 126/80   Pulse: 60   Temp: 97.8 °F (36.6 °C)   SpO2: 97%   Weight: 87.1 kg (192 lb)   Height: 167.6 cm (66\")   PainSc: 0-No pain     Body mass index is 30.99 kg/m².    Physical Exam  Constitutional:       Appearance: Normal appearance.   HENT:      Head: Normocephalic.   Eyes:      Extraocular " Movements: EOM normal.      Conjunctiva/sclera: Conjunctivae normal.   Pulmonary:      Effort: Pulmonary effort is normal.   Musculoskeletal:         General: Normal range of motion.   Skin:     General: Skin is warm and dry.   Neurological:      General: No focal deficit present.      Mental Status: She is alert and oriented to person, place, and time.      Cranial Nerves: Cranial nerves 2-12 are intact. No dysarthria.      Motor: Motor function is intact. No tremor or pronator drift.      Coordination: Coordination is intact. Romberg sign negative. Finger-Nose-Finger Test normal.      Gait: Gait is intact.   Psychiatric:         Attention and Perception: Attention normal.         Mood and Affect: Mood and affect normal.         Speech: Speech normal.         Behavior: Behavior normal. Behavior is cooperative.         Thought Content: Thought content normal.         Cognition and Memory: Cognition normal.         Judgment: Judgment normal.         Neurologic Exam     Mental Status   Oriented to person, place, and time.   Attention: normal. Concentration: normal.   Speech: speech is normal   Level of consciousness: alert  Knowledge: good.     Cranial Nerves   Cranial nerves II through XII intact.     CN II   Visual fields full to confrontation.     CN III, IV, VI   Extraocular motions are normal.   Right pupil: Size: 4 mm. Shape: regular.   Left pupil: Size: 4 mm. Shape: regular.   CN III: no CN III palsy  CN VI: no CN VI palsy  Nystagmus: none     CN VII   Facial expression full, symmetric.     CN IX, X   Palate: symmetric    CN XII   Tongue: not atrophic  Fasciculations: absent  Tongue deviation: none    Motor Exam     Strength   Right deltoid: 5/5  Left deltoid: 5/5  Right biceps: 5/5  Left biceps: 5/5  Right triceps: 5/5  Left triceps: 5/5  Right quadriceps: 4/5  Left quadriceps: 4/5  Right hamstrin/5  Left hamstrin/5  Right anterior tibial: 4/5  Left anterior tibial: 4/5  Right posterior tibial:  4/5  Left posterior tibial: 4/5  Right peroneal: 4/5  Left peroneal: 4/5  Right gastroc: 4/5  Left gastroc: 4/5    Gait, Coordination, and Reflexes     Gait  Gait: normal    Coordination   Finger to nose coordination: normal    Tremor   Resting tremor: absent  Intention tremor: absent  Action tremor: absent       Assessment / Plan      Assessment/Plan:   Diagnoses and all orders for this visit:    1. Central stenosis of spinal canal (Primary)  -     gabapentin (Neurontin) 100 MG capsule; Take 1 capsule by mouth 3 (Three) Times a Day.  Dispense: 90 capsule; Refill: 2  -     Ambulatory Referral to Pain Management    2. Numbness and tingling of both lower extremities  -     gabapentin (Neurontin) 100 MG capsule; Take 1 capsule by mouth 3 (Three) Times a Day.  Dispense: 90 capsule; Refill: 2  -     Ambulatory Referral to Pain Management       Will start patient on low-dose gabapentin for her pain and paresthesias in her bilateral lower extremities.  Controlled substance agreement has been signed.  [facility] report # [id number] is appropriate. Indications and side effects discussed with patient she verbalizes understanding.  Patient will call in the interim if she has any questions or concerns or changes.  -Referral has been placed for interventional pain management as patient is interested in injective therapy.  Follow Up:   Return in about 3 months (around 10/29/2024).    JOSE Pettit, FNP-[facility]

## 2024-08-04 DIAGNOSIS — R00.2 PALPITATIONS: ICD-10-CM

## 2024-08-04 DIAGNOSIS — I49.1 PAC (PREMATURE ATRIAL CONTRACTION): ICD-10-CM

## 2024-08-04 DIAGNOSIS — I47.10 SUPRAVENTRICULAR TACHYCARDIA: ICD-10-CM

## 2024-08-04 DIAGNOSIS — I10 PRIMARY HYPERTENSION: ICD-10-CM

## 2024-08-04 DIAGNOSIS — I49.3 PVC (PREMATURE VENTRICULAR CONTRACTION): ICD-10-CM

## 2024-08-05 RX ORDER — ATENOLOL 25 MG/1
12.5 TABLET ORAL DAILY
Qty: 45 TABLET | Refills: 3 | Status: SHIPPED | OUTPATIENT
Start: 2024-08-05

## 2024-08-14 ENCOUNTER — OFFICE VISIT (OUTPATIENT)
Dept: FAMILY MEDICINE CLINIC | Facility: CLINIC | Age: 69
End: 2024-08-14
Payer: MEDICARE

## 2024-08-14 VITALS
SYSTOLIC BLOOD PRESSURE: 115 MMHG | OXYGEN SATURATION: 95 % | HEART RATE: 59 BPM | HEIGHT: 66 IN | WEIGHT: 191 LBS | DIASTOLIC BLOOD PRESSURE: 75 MMHG | BODY MASS INDEX: 30.7 KG/M2

## 2024-08-14 DIAGNOSIS — R29.898 LEG WEAKNESS, BILATERAL: Primary | ICD-10-CM

## 2024-08-14 DIAGNOSIS — I47.10 SUPRAVENTRICULAR TACHYCARDIA: ICD-10-CM

## 2024-08-14 DIAGNOSIS — R79.9 ABNORMAL FINDING OF BLOOD CHEMISTRY, UNSPECIFIED: ICD-10-CM

## 2024-08-14 DIAGNOSIS — R20.2 PARESTHESIA: ICD-10-CM

## 2024-08-14 DIAGNOSIS — I10 PRIMARY HYPERTENSION: ICD-10-CM

## 2024-08-14 DIAGNOSIS — E03.9 ACQUIRED HYPOTHYROIDISM: ICD-10-CM

## 2024-08-14 DIAGNOSIS — G89.29 CHRONIC BILATERAL LOW BACK PAIN WITHOUT SCIATICA: ICD-10-CM

## 2024-08-14 DIAGNOSIS — M54.50 CHRONIC BILATERAL LOW BACK PAIN WITHOUT SCIATICA: ICD-10-CM

## 2024-08-14 PROCEDURE — 3078F DIAST BP <80 MM HG: CPT | Performed by: NURSE PRACTITIONER

## 2024-08-14 PROCEDURE — G2211 COMPLEX E/M VISIT ADD ON: HCPCS | Performed by: NURSE PRACTITIONER

## 2024-08-14 PROCEDURE — 99214 OFFICE O/P EST MOD 30 MIN: CPT | Performed by: NURSE PRACTITIONER

## 2024-08-14 PROCEDURE — 1159F MED LIST DOCD IN RCRD: CPT | Performed by: NURSE PRACTITIONER

## 2024-08-14 PROCEDURE — 1126F AMNT PAIN NOTED NONE PRSNT: CPT | Performed by: NURSE PRACTITIONER

## 2024-08-14 PROCEDURE — 3074F SYST BP LT 130 MM HG: CPT | Performed by: NURSE PRACTITIONER

## 2024-08-14 PROCEDURE — 1160F RVW MEDS BY RX/DR IN RCRD: CPT | Performed by: NURSE PRACTITIONER

## 2024-08-14 RX ORDER — UREA 40 %
CREAM (GRAM) TOPICAL
COMMUNITY
Start: 2024-08-13

## 2024-08-14 RX ORDER — TRIAMCINOLONE ACETONIDE 1 MG/G
CREAM TOPICAL
COMMUNITY
Start: 2024-08-12

## 2024-08-14 NOTE — PROGRESS NOTES
Subjective     Chief Complaint:    Chief Complaint   Patient presents with    Knee Pain     Been to specialty    Extremity Weakness     Pt sts can only walk so far,  they get tired to where she feels she can't go any farther, feels like bugs crawling around nside       History of Present Illness:   Notes worsening leg weakness, ache in the AM, notes crawling sensation but now worsening weakness, she intially told me about weakness in her ankles and had workup for that with MRI. Has seen NS and neurology. She did PT and continues PT but not helping with strength. Hip pain is better. Neurology and NS note reviewed.   She is sure she does not want surgery at this time   She has not started gabapentin yet   Hypothyroidism, on replacement   SVT/HLD/arrhythmia, follows with cards, tolerating her meds          Review of Systems  Gen- No fevers, chills  CV- No chest pain, palpitations  Resp- No cough, dyspnea  GI- No N/V/D, abd pain  Neuro-No dizziness, headaches      I have reviewed and/or updated the patient's past medical, surgical, family, social history and problem list as appropriate.     Medications:    Current Outpatient Medications:     albuterol sulfate  (90 Base) MCG/ACT inhaler, Inhale 2 puffs Every 4 (Four) Hours As Needed for Wheezing., Disp: 8.5 g, Rfl: 1    ammonium lactate (LAC-HYDRIN) 12 % lotion, APPLY A THIN LAYER TOPICALLY TO THE AFFECTED AREA DAILY, Disp: , Rfl:     aspirin 81 MG tablet, Take 1 tablet by mouth Daily., Disp: , Rfl:     atenolol (TENORMIN) 25 MG tablet, TAKE 1/2 TABLET BY MOUTH DAILY, Disp: 45 tablet, Rfl: 3    atorvastatin (LIPITOR) 40 MG tablet, TAKE 1 TABLET BY MOUTH DAILY, Disp: 90 tablet, Rfl: 3    cetirizine (zyrTEC) 10 MG tablet, TAKE 1 TABLET BY MOUTH EVERY DAY, Disp: 90 tablet, Rfl: 3    flecainide (TAMBOCOR) 100 MG tablet, TAKE 1 TABLET BY MOUTH TWICE DAILY, Disp: 180 tablet, Rfl: 3    Fluticasone Furoate-Vilanterol (Breo Ellipta) 200-25 MCG/ACT inhaler, Inhale 1  "puff Daily., Disp: 60 each, Rfl: 5    gabapentin (Neurontin) 100 MG capsule, Take 1 capsule by mouth 3 (Three) Times a Day., Disp: 90 capsule, Rfl: 2    ipratropium (ATROVENT) 0.03 % nasal spray, ADMINSTER 2 SPRAYS INTO THE BOTH NOSTRILS EVERY 12 HOURS, Disp: 30 mL, Rfl: 1    levothyroxine (SYNTHROID, LEVOTHROID) 150 MCG tablet, Take 1 tablet by mouth Every Morning., Disp: 90 tablet, Rfl: 3    meloxicam (MOBIC) 15 MG tablet, Take 1 tablet by mouth Daily., Disp: , Rfl:     nitroglycerin (NITROSTAT) 0.4 MG SL tablet, 1 under the tongue as needed for angina, may repeat q5mins for up three doses, Disp: 30 tablet, Rfl: 3    Tea Tree Oil (NO FUNGUS NAIL PROTECTANT EX), Mix according to instructions and apply to affected nail beds once daily., Disp: , Rfl:     triamcinolone (KENALOG) 0.1 % cream, , Disp: , Rfl:     urea (CARMOL) 40 % cream, , Disp: , Rfl:     Allergies:  Allergies   Allergen Reactions    Latex Rash       Objective     Vital Signs:   Vitals:    08/14/24 1335   BP: 115/75   Pulse: 59   SpO2: 95%   Weight: 86.6 kg (191 lb)   Height: 167.6 cm (66\")     Body mass index is 30.83 kg/m².    Physical Exam:    Physical Exam  Vitals and nursing note reviewed.   Constitutional:       Appearance: She is well-developed.   HENT:      Head: Normocephalic and atraumatic.   Eyes:      Pupils: Pupils are equal, round, and reactive to light.   Cardiovascular:      Rate and Rhythm: Normal rate and regular rhythm.      Heart sounds: Normal heart sounds.   Pulmonary:      Effort: Pulmonary effort is normal.      Breath sounds: Normal breath sounds.   Abdominal:      General: Bowel sounds are normal. There is no distension.      Palpations: Abdomen is soft.      Tenderness: There is no abdominal tenderness.   Musculoskeletal:      Cervical back: Neck supple.      Thoracic back: No spasms or tenderness.      Lumbar back: No spasms or tenderness. Negative right straight leg raise test and negative left straight leg raise test. "   Skin:     General: Skin is warm and dry.   Neurological:      General: No focal deficit present.      Mental Status: She is alert and oriented to person, place, and time.   Psychiatric:         Mood and Affect: Mood normal.         Behavior: Behavior normal.         Assessment / Plan     Assessment/Plan:   Problem List Items Addressed This Visit       Supraventricular tachycardia    Relevant Medications    nitroglycerin (NITROSTAT) 0.4 MG SL tablet    atenolol (TENORMIN) 25 MG tablet    Other Relevant Orders    Comprehensive Metabolic Panel    CBC Auto Differential    Lipid Panel    Acquired hypothyroidism    Relevant Medications    levothyroxine (SYNTHROID, LEVOTHROID) 150 MCG tablet    atenolol (TENORMIN) 25 MG tablet    Other Relevant Orders    TSH    Hypertension    Overview     1 Stress test 7/23/14 - low risk, no ischemia   6.2 Echo 7/21/14 - EF 55-60%; DD II; trace MR, TR           Relevant Medications    atenolol (TENORMIN) 25 MG tablet    Other Relevant Orders    Lipid Panel     Other Visit Diagnoses       Leg weakness, bilateral    -  Primary    Relevant Orders    Ambulatory Referral to Neurology    Paresthesia        Relevant Orders    Ambulatory Referral to Neurology    Vitamin B12    Folate    Iron Profile    Ferritin    Chronic bilateral low back pain without sciatica        Abnormal finding of blood chemistry, unspecified        Relevant Orders    Iron Profile    Ferritin            -- I am still concerned about an underlying neurological disorder that is causing her progressive leg weakness as this does not fully fit the picture of her lumbar issues causing her weakness. She is complaint with PT. I want her to see Dr Barger to rule out underlying neurological disorder. If in face this is rule out then I would recommend she pursue surgery if symptoms persist.   -- keep f/u with cards  -- chronic conditions stable  -- labs as above     Discussed plan of care in detail with pt today; pt verb  understanding and agrees.    Follow up:  3 months     Electronically signed by JOSE Baez   08/14/2024 13:43 EDT      Please note that portions of this note were completed with a voice recognition program.

## 2024-08-15 LAB
ALBUMIN SERPL-MCNC: 4.2 G/DL (ref 3.9–4.9)
ALP SERPL-CCNC: 211 IU/L (ref 44–121)
ALT SERPL-CCNC: 34 IU/L (ref 0–32)
AST SERPL-CCNC: 32 IU/L (ref 0–40)
BASOPHILS # BLD AUTO: 0.1 X10E3/UL (ref 0–0.2)
BASOPHILS NFR BLD AUTO: 1 %
BILIRUB SERPL-MCNC: 0.3 MG/DL (ref 0–1.2)
BUN SERPL-MCNC: 16 MG/DL (ref 8–27)
BUN/CREAT SERPL: 21 (ref 12–28)
CALCIUM SERPL-MCNC: 9.3 MG/DL (ref 8.7–10.3)
CHLORIDE SERPL-SCNC: 103 MMOL/L (ref 96–106)
CHOLEST SERPL-MCNC: 100 MG/DL (ref 100–199)
CO2 SERPL-SCNC: 26 MMOL/L (ref 20–29)
CREAT SERPL-MCNC: 0.76 MG/DL (ref 0.57–1)
EGFRCR SERPLBLD CKD-EPI 2021: 86 ML/MIN/1.73
EOSINOPHIL # BLD AUTO: 0.2 X10E3/UL (ref 0–0.4)
EOSINOPHIL NFR BLD AUTO: 2 %
ERYTHROCYTE [DISTWIDTH] IN BLOOD BY AUTOMATED COUNT: 12.5 % (ref 11.7–15.4)
FERRITIN SERPL-MCNC: 25 NG/ML (ref 15–150)
FOLATE SERPL-MCNC: >20 NG/ML
GLOBULIN SER CALC-MCNC: 2.5 G/DL (ref 1.5–4.5)
GLUCOSE SERPL-MCNC: 85 MG/DL (ref 70–99)
HCT VFR BLD AUTO: 44 % (ref 34–46.6)
HDLC SERPL-MCNC: 32 MG/DL
HGB BLD-MCNC: 14.2 G/DL (ref 11.1–15.9)
IMM GRANULOCYTES # BLD AUTO: 0.1 X10E3/UL (ref 0–0.1)
IMM GRANULOCYTES NFR BLD AUTO: 1 %
IRON SATN MFR SERPL: 19 % (ref 15–55)
IRON SERPL-MCNC: 71 UG/DL (ref 27–139)
LDLC SERPL CALC-MCNC: 35 MG/DL (ref 0–99)
LYMPHOCYTES # BLD AUTO: 2.6 X10E3/UL (ref 0.7–3.1)
LYMPHOCYTES NFR BLD AUTO: 28 %
MCH RBC QN AUTO: 31.3 PG (ref 26.6–33)
MCHC RBC AUTO-ENTMCNC: 32.3 G/DL (ref 31.5–35.7)
MCV RBC AUTO: 97 FL (ref 79–97)
MONOCYTES # BLD AUTO: 0.8 X10E3/UL (ref 0.1–0.9)
MONOCYTES NFR BLD AUTO: 9 %
NEUTROPHILS # BLD AUTO: 5.5 X10E3/UL (ref 1.4–7)
NEUTROPHILS NFR BLD AUTO: 59 %
PLATELET # BLD AUTO: 227 X10E3/UL (ref 150–450)
POTASSIUM SERPL-SCNC: 4.1 MMOL/L (ref 3.5–5.2)
PROT SERPL-MCNC: 6.7 G/DL (ref 6–8.5)
RBC # BLD AUTO: 4.53 X10E6/UL (ref 3.77–5.28)
SODIUM SERPL-SCNC: 140 MMOL/L (ref 134–144)
TIBC SERPL-MCNC: 374 UG/DL (ref 250–450)
TRIGL SERPL-MCNC: 206 MG/DL (ref 0–149)
TSH SERPL DL<=0.005 MIU/L-ACNC: 1.13 UIU/ML (ref 0.45–4.5)
UIBC SERPL-MCNC: 303 UG/DL (ref 118–369)
VIT B12 SERPL-MCNC: 574 PG/ML (ref 232–1245)
VLDLC SERPL CALC-MCNC: 33 MG/DL (ref 5–40)
WBC # BLD AUTO: 9.1 X10E3/UL (ref 3.4–10.8)

## 2024-09-13 DIAGNOSIS — E03.9 ACQUIRED HYPOTHYROIDISM: ICD-10-CM

## 2024-09-17 DIAGNOSIS — E03.9 ACQUIRED HYPOTHYROIDISM: ICD-10-CM

## 2024-09-17 RX ORDER — LEVOTHYROXINE SODIUM 150 UG/1
150 TABLET ORAL
Qty: 90 TABLET | Refills: 3 | Status: SHIPPED | OUTPATIENT
Start: 2024-09-17

## 2024-09-19 RX ORDER — LEVOTHYROXINE SODIUM 150 UG/1
150 TABLET ORAL
Qty: 90 TABLET | Refills: 3 | OUTPATIENT
Start: 2024-09-19

## 2024-09-25 LAB
MAXIMAL PREDICTED HEART RATE: 160 BPM
STRESS TARGET HR: 136 BPM

## 2024-10-04 ENCOUNTER — OFFICE VISIT (OUTPATIENT)
Dept: NEUROLOGY | Facility: CLINIC | Age: 69
End: 2024-10-04
Payer: MEDICARE

## 2024-10-04 VITALS
BODY MASS INDEX: 30.86 KG/M2 | DIASTOLIC BLOOD PRESSURE: 82 MMHG | HEART RATE: 68 BPM | HEIGHT: 66 IN | SYSTOLIC BLOOD PRESSURE: 130 MMHG | OXYGEN SATURATION: 97 % | WEIGHT: 192 LBS

## 2024-10-04 DIAGNOSIS — M48.07 SPINAL STENOSIS OF LUMBOSACRAL REGION: Primary | ICD-10-CM

## 2024-10-04 PROCEDURE — 1159F MED LIST DOCD IN RCRD: CPT | Performed by: PSYCHIATRY & NEUROLOGY

## 2024-10-04 PROCEDURE — 1160F RVW MEDS BY RX/DR IN RCRD: CPT | Performed by: PSYCHIATRY & NEUROLOGY

## 2024-10-04 PROCEDURE — 3075F SYST BP GE 130 - 139MM HG: CPT | Performed by: PSYCHIATRY & NEUROLOGY

## 2024-10-04 PROCEDURE — 3079F DIAST BP 80-89 MM HG: CPT | Performed by: PSYCHIATRY & NEUROLOGY

## 2024-10-04 PROCEDURE — 99214 OFFICE O/P EST MOD 30 MIN: CPT | Performed by: PSYCHIATRY & NEUROLOGY

## 2024-10-04 NOTE — PROGRESS NOTES
Subjective   Patient ID: Justina Ch is a 69 y.o. female     Chief Complaint   Patient presents with    Extremity Weakness     BLE, unable to get on tiptoes, aching        History of Present Illness    69 y.o. female referred by Sonali GARCIA for B LE weakness.     EMG/NCS 24 mild chronic B L5-S1 radiculopathies     MRI Brain 10/31/23 atrophy,      Trouble walking on toes.     Reviewed medical records:    LBO and B LE weakness.      Dr Bedoya recommended 2 level laminectomy L3-3, L4-5 .  Deferred surgery.      Labs NCS   Past Medical History:   Diagnosis Date    Abnormal cardiovascular stress test 2016    Allergic     Arrhythmia     Arthritis     Cancer     cervical    Closed fracture of left fibula     Coronary artery disease     COVID-19 2021    Dizziness 2021    Enlarged heart     Graves disease 2016    status post ablation with hypothyroidism.      History of COVID-19 2021    Hx of mammogram 2016    Hx of staphylococcal infection     Hyperlipidemia     Hypertension 2016    Hyperthyroidism     Pre-syncope 2016    Reactive airway disease 2022    Shingles 2011    Snoring 2016    Supraventricular tachycardia 2016    SVT (supraventricular tachycardia)     status post ablation     Family History   Problem Relation Age of Onset    Hypertension Mother     Breast cancer Mother     Bone cancer Mother     Cancer Mother         Mother passed away    Dementia Mother     Heart attack Father     Heart disease Father         pacemaker    Hyperlipidemia Father     Hypertension Father     Diabetes Father         Passed away    Breast cancer Sister     Ovarian cancer Sister     Cancer Sister         sister passed away    Diabetes Paternal Grandmother      Social History     Socioeconomic History    Marital status:    Tobacco Use    Smoking status: Former     Current packs/day: 0.00     Average packs/day: 0.5 packs/day for 29.0  "years (14.5 ttl pk-yrs)     Types: Cigarettes     Start date: 1972     Quit date: 2001     Years since quittin.4     Passive exposure: Never    Smokeless tobacco: Never   Vaping Use    Vaping status: Never Used   Substance and Sexual Activity    Alcohol use: No    Drug use: Never    Sexual activity: Not Currently       Review of Systems   Constitutional:  Negative for activity change, fatigue and unexpected weight change.   HENT:  Negative for tinnitus and trouble swallowing.    Eyes:  Negative for photophobia and visual disturbance.   Respiratory:  Negative for apnea, cough and choking.    Cardiovascular:  Negative for leg swelling.   Gastrointestinal:  Negative for nausea and vomiting.   Endocrine: Negative for cold intolerance and heat intolerance.   Genitourinary:  Negative for difficulty urinating, frequency, menstrual problem and urgency.   Musculoskeletal:  Positive for back pain. Negative for gait problem, myalgias and neck pain.   Skin:  Negative for color change and rash.   Allergic/Immunologic: Negative for immunocompromised state.   Neurological:  Positive for weakness. Negative for dizziness, tremors, seizures, syncope, facial asymmetry, speech difficulty, light-headedness, numbness and headaches.   Hematological:  Negative for adenopathy. Does not bruise/bleed easily.   Psychiatric/Behavioral:  Negative for behavioral problems, confusion, decreased concentration, hallucinations and sleep disturbance.        Objective     Vitals:    10/04/24 1448   BP: 130/82   Pulse: 68   SpO2: 97%   Weight: 87.1 kg (192 lb)   Height: 167.6 cm (65.98\")       Neurologic Exam     Mental Status   Oriented to person, place, and time.   Speech: speech is normal   Level of consciousness: alert  Knowledge: good and consistent with education.   Normal comprehension.     Cranial Nerves   Cranial nerves II through XII intact.     CN II   Visual fields full to confrontation.   Visual acuity: normal  Right visual " field deficit: none  Left visual field deficit: none     CN III, IV, VI   Pupils are equal, round, and reactive to light.  Extraocular motions are normal.   Nystagmus: none   Diplopia: none  Ophthalmoparesis: none  Upgaze: normal  Downgaze: normal  Conjugate gaze: present    CN V   Facial sensation intact.   Right corneal reflex: normal  Left corneal reflex: normal    CN VII   Right facial weakness: none  Left facial weakness: none    CN VIII   Hearing: intact    CN IX, X   Palate: symmetric  Right gag reflex: normal  Left gag reflex: normal    CN XI   Right sternocleidomastoid strength: normal  Left sternocleidomastoid strength: normal    CN XII   Tongue: not atrophic  Fasciculations: absent  Tongue deviation: none    Motor Exam   Muscle bulk: normal  Overall muscle tone: normal    Strength   Strength 5/5 throughout.     Sensory Exam   Light touch normal.     Gait, Coordination, and Reflexes     Gait  Gait: normal    Tremor   Resting tremor: absent  Intention tremor: absent  Action tremor: absent    Reflexes   Reflexes 2+ except as noted.        Physical Exam  Eyes:      Extraocular Movements: EOM normal.      Pupils: Pupils are equal, round, and reactive to light.   Neurological:      Mental Status: She is oriented to person, place, and time.      Cranial Nerves: Cranial nerves 2-12 are intact.      Motor: Motor strength is normal.     Gait: Gait is intact.   Psychiatric:         Speech: Speech normal.         Office Visit on 08/14/2024   Component Date Value Ref Range Status    Glucose 08/14/2024 85  70 - 99 mg/dL Final    BUN 08/14/2024 16  8 - 27 mg/dL Final    Creatinine 08/14/2024 0.76  0.57 - 1.00 mg/dL Final    EGFR Result 08/14/2024 86  >59 mL/min/1.73 Final    BUN/Creatinine Ratio 08/14/2024 21  12 - 28 Final    Sodium 08/14/2024 140  134 - 144 mmol/L Final    Potassium 08/14/2024 4.1  3.5 - 5.2 mmol/L Final    Chloride 08/14/2024 103  96 - 106 mmol/L Final    Total CO2 08/14/2024 26  20 - 29 mmol/L Final     Calcium 08/14/2024 9.3  8.7 - 10.3 mg/dL Final    Total Protein 08/14/2024 6.7  6.0 - 8.5 g/dL Final    Albumin 08/14/2024 4.2  3.9 - 4.9 g/dL Final    Globulin 08/14/2024 2.5  1.5 - 4.5 g/dL Final    Total Bilirubin 08/14/2024 0.3  0.0 - 1.2 mg/dL Final    Alkaline Phosphatase 08/14/2024 211 (H)  44 - 121 IU/L Final    AST (SGOT) 08/14/2024 32  0 - 40 IU/L Final    ALT (SGPT) 08/14/2024 34 (H)  0 - 32 IU/L Final    Total Cholesterol 08/14/2024 100  100 - 199 mg/dL Final    Triglycerides 08/14/2024 206 (H)  0 - 149 mg/dL Final    HDL Cholesterol 08/14/2024 32 (L)  >39 mg/dL Final    VLDL Cholesterol Sean 08/14/2024 33  5 - 40 mg/dL Final    LDL Chol Calc (NIH) 08/14/2024 35  0 - 99 mg/dL Final    TSH 08/14/2024 1.130  0.450 - 4.500 uIU/mL Final    Vitamin B-12 08/14/2024 574  232 - 1,245 pg/mL Final    Folate 08/14/2024 >20.0  >3.0 ng/mL Final    Comment: A serum folate concentration of less than 3.1 ng/mL is  considered to represent clinical deficiency.      TIBC 08/14/2024 374  250 - 450 ug/dL Final    UIBC 08/14/2024 303  118 - 369 ug/dL Final    Iron 08/14/2024 71  27 - 139 ug/dL Final    Iron Saturation 08/14/2024 19  15 - 55 % Final    Ferritin 08/14/2024 25  15 - 150 ng/mL Final    WBC 08/14/2024 9.1  3.4 - 10.8 x10E3/uL Final    RBC 08/14/2024 4.53  3.77 - 5.28 x10E6/uL Final    Hemoglobin 08/14/2024 14.2  11.1 - 15.9 g/dL Final    Hematocrit 08/14/2024 44.0  34.0 - 46.6 % Final    MCV 08/14/2024 97  79 - 97 fL Final    MCH 08/14/2024 31.3  26.6 - 33.0 pg Final    MCHC 08/14/2024 32.3  31.5 - 35.7 g/dL Final    RDW 08/14/2024 12.5  11.7 - 15.4 % Final    Platelets 08/14/2024 227  150 - 450 x10E3/uL Final    Neutrophil Rel % 08/14/2024 59  Not Estab. % Final    Lymphocyte Rel % 08/14/2024 28  Not Estab. % Final    Monocyte Rel % 08/14/2024 9  Not Estab. % Final    Eosinophil Rel % 08/14/2024 2  Not Estab. % Final    Basophil Rel % 08/14/2024 1  Not Estab. % Final    Neutrophils Absolute 08/14/2024 5.5  1.4  - 7.0 x10E3/uL Final    Lymphocytes Absolute 08/14/2024 2.6  0.7 - 3.1 x10E3/uL Final    Monocytes Absolute 08/14/2024 0.8  0.1 - 0.9 x10E3/uL Final    Eosinophils Absolute 08/14/2024 0.2  0.0 - 0.4 x10E3/uL Final    Basophils Absolute 08/14/2024 0.1  0.0 - 0.2 x10E3/uL Final    Immature Granulocyte Rel % 08/14/2024 1  Not Estab. % Final    Immature Grans Absolute 08/14/2024 0.1  0.0 - 0.1 x10E3/uL Final         Assessment & Plan     Problem List Items Addressed This Visit          Neuro    Spinal stenosis of lumbosacral region - Primary    Current Assessment & Plan     Recommend lumbar surgery                No follow-ups on file.

## 2024-10-11 ENCOUNTER — OFFICE VISIT (OUTPATIENT)
Dept: CARDIOLOGY | Facility: CLINIC | Age: 69
End: 2024-10-11
Payer: MEDICARE

## 2024-10-11 VITALS
OXYGEN SATURATION: 98 % | HEART RATE: 59 BPM | WEIGHT: 191.2 LBS | DIASTOLIC BLOOD PRESSURE: 72 MMHG | SYSTOLIC BLOOD PRESSURE: 132 MMHG | BODY MASS INDEX: 30.73 KG/M2 | HEIGHT: 66 IN

## 2024-10-11 DIAGNOSIS — I47.10 SUPRAVENTRICULAR TACHYCARDIA: ICD-10-CM

## 2024-10-11 DIAGNOSIS — I65.23 BILATERAL CAROTID ARTERY STENOSIS: ICD-10-CM

## 2024-10-11 DIAGNOSIS — F17.211 CIGARETTE NICOTINE DEPENDENCE IN REMISSION: ICD-10-CM

## 2024-10-11 DIAGNOSIS — Z01.810 PRE-OPERATIVE CARDIOVASCULAR EXAMINATION: Primary | ICD-10-CM

## 2024-10-11 DIAGNOSIS — E78.5 DYSLIPIDEMIA: ICD-10-CM

## 2024-10-11 DIAGNOSIS — I49.1 PAC (PREMATURE ATRIAL CONTRACTION): ICD-10-CM

## 2024-10-11 DIAGNOSIS — R07.89 OTHER CHEST PAIN: ICD-10-CM

## 2024-10-11 DIAGNOSIS — I49.3 PVC (PREMATURE VENTRICULAR CONTRACTION): ICD-10-CM

## 2024-10-11 DIAGNOSIS — I51.89 DIASTOLIC DYSFUNCTION: ICD-10-CM

## 2024-10-11 DIAGNOSIS — I10 PRIMARY HYPERTENSION: ICD-10-CM

## 2024-10-11 DIAGNOSIS — I20.89 ANGINAL EQUIVALENT: ICD-10-CM

## 2024-10-11 DIAGNOSIS — R00.2 PALPITATIONS: ICD-10-CM

## 2024-10-11 DIAGNOSIS — Z98.890 HISTORY OF RADIOFREQUENCY ABLATION (RFA) PROCEDURE FOR CARDIAC ARRHYTHMIA: ICD-10-CM

## 2024-10-11 RX ORDER — FLECAINIDE ACETATE 100 MG/1
100 TABLET ORAL 2 TIMES DAILY
Qty: 180 TABLET | Refills: 3 | Status: SHIPPED | OUTPATIENT
Start: 2024-10-11

## 2024-10-11 RX ORDER — ATENOLOL 25 MG/1
12.5 TABLET ORAL DAILY
Qty: 45 TABLET | Refills: 3 | Status: SHIPPED | OUTPATIENT
Start: 2024-10-11

## 2024-10-11 RX ORDER — ATORVASTATIN CALCIUM 40 MG/1
40 TABLET, FILM COATED ORAL DAILY
Qty: 90 TABLET | Refills: 3 | Status: SHIPPED | OUTPATIENT
Start: 2024-10-11

## 2024-10-11 RX ORDER — NITROGLYCERIN 0.4 MG/1
TABLET SUBLINGUAL
Qty: 30 TABLET | Refills: 3 | Status: SHIPPED | OUTPATIENT
Start: 2024-10-11

## 2024-10-11 NOTE — PROGRESS NOTES
Subjective   Follow up, SVT, HTN  Justina Flaquita Ch is a 69 y.o. female who presents to day for Follow-up (Here for 6 mo. F/u), Rapid Heart Rate, Palpitations, Hyperlipidemia, Hypertension, and Carotid Artery Disease.    CHIEF COMPLIANT  Chief Complaint   Patient presents with    Follow-up     Here for 6 mo. F/u    Rapid Heart Rate    Palpitations    Hyperlipidemia    Hypertension    Carotid Artery Disease     Problem List:      Supraventricular tachycardia:   History of tachypalpitations and emergency room visits dating back to at least 2-3 years ago.   Emergency room visit in July with subsequent hospitalization for supraventricular tachycardia at [facility] for initiation of flecainide.   Event recorder, July 2014, demonstrating runs of narrow QRS tachycardia, as fast as 190-200 beats per minute.   Echocardiogram, 07/21/2014: Normal left ventricular size and function with ejection fraction of 55%. No significant valvular insufficiency.   [facility], database normal, per patient, database incomplete.  Status post EP study and RFA of left atrial tachycardia and right atrial isthmus-dependent flutter on 10/04/2014.  Recurrent tachypalpitations with event monitor, 01/23/2015 thru 02/21/2015, showing sinus rhythm with occasional PAC and sinus arrhythmia and no SVT or atrial fibrillation.   Initiation of Tambocor therapy for suppression of PACs, 02/27/2015.   Echo 7/12/17 EF 65%, trace TR  Nuclear stress test 7/12/17 EF 75%, no ischemia  Echocardiogram 10/2/18: EF 60%, no significant valvular disease  Event monitor 11/19/18: Predominately NSR, <1% PAC's/PVC's  Grave’s disease, status post ablation with hypothyroidism.   Dyslipidemia.   Hypertension.   Skin cancer.   Surgical history:  D and C.   Skin excision     Problem List Items Addressed This Visit          Cardiac and Vasculature    Supraventricular tachycardia    Relevant Medications    atenolol (TENORMIN) 25 MG tablet     nitroglycerin (NITROSTAT) 0.4 MG SL tablet    Other Relevant Orders    Stress Test With Myocardial Perfusion One Day    Adult Transthoracic Echo Complete w/ Color, Spectral and Contrast if necessary per protocol    Palpitations    Relevant Medications    atenolol (TENORMIN) 25 MG tablet    Dyslipidemia    Relevant Medications    atorvastatin (LIPITOR) 40 MG tablet    Hypertension    Overview     1 Stress test 7/23/14 - low risk, no ischemia   6.2 Echo 7/21/14 - EF 55-60%; DD II; trace MR, TR           Relevant Medications    atenolol (TENORMIN) 25 MG tablet    PAC (premature atrial contraction)    Relevant Medications    atenolol (TENORMIN) 25 MG tablet    nitroglycerin (NITROSTAT) 0.4 MG SL tablet    Bilateral carotid artery stenosis    Relevant Medications    atorvastatin (LIPITOR) 40 MG tablet    PVC (premature ventricular contraction)    Relevant Medications    atenolol (TENORMIN) 25 MG tablet    flecainide (TAMBOCOR) 100 MG tablet    nitroglycerin (NITROSTAT) 0.4 MG SL tablet    Other Relevant Orders    Stress Test With Myocardial Perfusion One Day    Adult Transthoracic Echo Complete w/ Color, Spectral and Contrast if necessary per protocol    Diastolic dysfunction    History of radiofrequency ablation (RFA) procedure for cardiac arrhythmia    Pre-operative cardiovascular examination - Primary    Anginal equivalent    Relevant Medications    atenolol (TENORMIN) 25 MG tablet    nitroglycerin (NITROSTAT) 0.4 MG SL tablet    Other Relevant Orders    Stress Test With Myocardial Perfusion One Day    Adult Transthoracic Echo Complete w/ Color, Spectral and Contrast if necessary per protocol       Tobacco    Cigarette nicotine dependence in remission     Other Visit Diagnoses       Other chest pain        Relevant Medications    nitroglycerin (NITROSTAT) 0.4 MG SL tablet            HPI  Patient has been having a lot of back pain and causing weakness of both of her legs now she can barely walk and she has been seen by  neurosurgery who advised her to have surgery for the back but she is kind of hesitant right now she is quite sedentary and noticed that now she is quite more short of breath than before going about every 11 steps and that she would be quite short of breath there is no chest pain no edema no palpitations no dizziness                  PRIOR MEDS  Current Outpatient Medications on File Prior to Visit   Medication Sig Dispense Refill    albuterol sulfate  (90 Base) MCG/ACT inhaler Inhale 2 puffs Every 4 (Four) Hours As Needed for Wheezing. 8.5 g 1    ammonium lactate (LAC-HYDRIN) 12 % lotion APPLY A THIN LAYER TOPICALLY TO THE AFFECTED AREA DAILY      aspirin 81 MG tablet Take 1 tablet by mouth Daily.      cetirizine (zyrTEC) 10 MG tablet TAKE 1 TABLET BY MOUTH EVERY DAY 90 tablet 3    Fluticasone Furoate-Vilanterol (Breo Ellipta) 200-25 MCG/ACT inhaler Inhale 1 puff Daily. (Patient taking differently: Inhale 1 puff As Needed.) 60 each 5    gabapentin (Neurontin) 100 MG capsule Take 1 capsule by mouth 3 (Three) Times a Day. 90 capsule 2    ipratropium (ATROVENT) 0.03 % nasal spray ADMINSTER 2 SPRAYS INTO THE BOTH NOSTRILS EVERY 12 HOURS 30 mL 1    levothyroxine (SYNTHROID, LEVOTHROID) 150 MCG tablet Take 1 tablet by mouth Every Morning. 90 tablet 3    meloxicam (MOBIC) 15 MG tablet Take 1 tablet by mouth Daily.      Tea Tree Oil (NO FUNGUS NAIL PROTECTANT EX) Mix according to instructions and apply to affected nail beds once daily.      triamcinolone (KENALOG) 0.1 % cream prn      urea (CARMOL) 40 % cream prn      [DISCONTINUED] atenolol (TENORMIN) 25 MG tablet TAKE 1/2 TABLET BY MOUTH DAILY 45 tablet 3    [DISCONTINUED] atorvastatin (LIPITOR) 40 MG tablet TAKE 1 TABLET BY MOUTH DAILY 90 tablet 3    [DISCONTINUED] flecainide (TAMBOCOR) 100 MG tablet TAKE 1 TABLET BY MOUTH TWICE DAILY 180 tablet 3    [DISCONTINUED] nitroglycerin (NITROSTAT) 0.4 MG SL tablet 1 under the tongue as needed for angina, may repeat  q5mins for up three doses (Patient not taking: Reported on 10/4/2024) 30 tablet 3     No current facility-administered medications on file prior to visit.       ALLERGIES  Latex    HISTORY  Past Medical History:   Diagnosis Date    Abnormal cardiovascular stress test 2016    Allergic     Arrhythmia     Arthritis     Cancer     cervical    Closed fracture of left fibula     Coronary artery disease     COVID-19 2021    Dizziness 2021    Enlarged heart     Graves disease 2016    status post ablation with hypothyroidism.      History of COVID-19 2021    Hx of mammogram 2016    Hx of staphylococcal infection     Hyperlipidemia     Hypertension 2016    Hyperthyroidism     Pre-syncope 2016    Reactive airway disease 2022    Shingles 2011    Snoring 2016    Supraventricular tachycardia 2016    SVT (supraventricular tachycardia)     status post ablation       Social History     Socioeconomic History    Marital status:    Tobacco Use    Smoking status: Former     Current packs/day: 0.00     Average packs/day: 0.5 packs/day for 29.0 years (14.5 ttl pk-yrs)     Types: Cigarettes     Start date: 1972     Quit date: 2001     Years since quittin.4     Passive exposure: Never    Smokeless tobacco: Never   Vaping Use    Vaping status: Never Used   Substance and Sexual Activity    Alcohol use: No    Drug use: Never    Sexual activity: Not Currently       Family History   Problem Relation Age of Onset    Hypertension Mother     Breast cancer Mother     Bone cancer Mother     Cancer Mother         Mother passed away    Dementia Mother     Heart attack Father     Heart disease Father         pacemaker    Hyperlipidemia Father     Hypertension Father     Diabetes Father         Passed away    Breast cancer Sister     Ovarian cancer Sister     Cancer Sister         sister passed away    Diabetes Paternal Grandmother        Review of Systems  "  Constitutional:  Positive for fatigue.   HENT: Negative.     Eyes:  Positive for visual disturbance (glasses).   Respiratory:  Positive for shortness of breath.    Cardiovascular: Negative.    Gastrointestinal: Negative.    Endocrine: Negative.    Genitourinary: Negative.    Musculoskeletal:  Positive for arthralgias and myalgias.   Skin: Negative.    Allergic/Immunologic: Positive for environmental allergies.   Neurological:  Positive for weakness (overall in legs). Negative for dizziness, syncope and light-headedness.        Imbalance   Hematological:  Bruises/bleeds easily.   Psychiatric/Behavioral:  Positive for sleep disturbance.        Objective     VITALS: /72 (BP Location: Left arm, Patient Position: Sitting)   Pulse 59   Ht 167.6 cm (65.98\")   Wt 86.7 kg (191 lb 3.2 oz)   LMP  (LMP Unknown)   SpO2 98%   BMI 30.88 kg/m²     LABS:   Lab Results (most recent)       None            IMAGING:   No Images in the past 120 days found..    EXAM:  Physical Exam  Vitals reviewed.   Constitutional:       Appearance: She is well-developed.   HENT:      Head: Normocephalic and atraumatic.   Eyes:      Pupils: Pupils are equal, round, and reactive to light.   Neck:      Thyroid: No thyromegaly.      Vascular: No JVD.   Cardiovascular:      Rate and Rhythm: Normal rate and regular rhythm.      Heart sounds: Normal heart sounds. No murmur heard.     No friction rub. No gallop.   Pulmonary:      Effort: Pulmonary effort is normal. No respiratory distress.      Breath sounds: Normal breath sounds. No stridor. No wheezing or rales.   Chest:      Chest wall: No tenderness.   Musculoskeletal:         General: No tenderness or deformity.      Cervical back: Neck supple.   Skin:     General: Skin is warm and dry.   Neurological:      Mental Status: She is alert and oriented to person, place, and time.      Cranial Nerves: No cranial nerve deficit.      Coordination: Coordination normal.         Procedure   Procedures "        Assessment & Plan     Diagnoses and all orders for this visit:    1. Pre-operative cardiovascular examination (Primary)    2. Bilateral carotid artery stenosis  -     atorvastatin (LIPITOR) 40 MG tablet; Take 1 tablet by mouth Daily.  Dispense: 90 tablet; Refill: 3    3. Diastolic dysfunction    4. Dyslipidemia  -     atorvastatin (LIPITOR) 40 MG tablet; Take 1 tablet by mouth Daily.  Dispense: 90 tablet; Refill: 3    5. Primary hypertension  -     atenolol (TENORMIN) 25 MG tablet; Take 0.5 tablets by mouth Daily.  Dispense: 45 tablet; Refill: 3    6. Palpitations  -     atenolol (TENORMIN) 25 MG tablet; Take 0.5 tablets by mouth Daily.  Dispense: 45 tablet; Refill: 3    7. Supraventricular tachycardia  -     atenolol (TENORMIN) 25 MG tablet; Take 0.5 tablets by mouth Daily.  Dispense: 45 tablet; Refill: 3  -     Stress Test With Myocardial Perfusion One Day; Future  -     Adult Transthoracic Echo Complete w/ Color, Spectral and Contrast if necessary per protocol; Future    8. History of radiofrequency ablation (RFA) procedure for cardiac arrhythmia    9. Cigarette nicotine dependence in remission    10. PAC (premature atrial contraction)  -     atenolol (TENORMIN) 25 MG tablet; Take 0.5 tablets by mouth Daily.  Dispense: 45 tablet; Refill: 3    11. PVC (premature ventricular contraction)  -     atenolol (TENORMIN) 25 MG tablet; Take 0.5 tablets by mouth Daily.  Dispense: 45 tablet; Refill: 3  -     flecainide (TAMBOCOR) 100 MG tablet; Take 1 tablet by mouth 2 (Two) Times a Day.  Dispense: 180 tablet; Refill: 3  -     Stress Test With Myocardial Perfusion One Day; Future  -     Adult Transthoracic Echo Complete w/ Color, Spectral and Contrast if necessary per protocol; Future    12. Other chest pain  -     nitroglycerin (NITROSTAT) 0.4 MG SL tablet; 1 under the tongue as needed for angina, may repeat q5mins for up three doses  Dispense: 30 tablet; Refill: 3    13. Anginal equivalent  -     Stress Test With  Myocardial Perfusion One Day; Future  -     Adult Transthoracic Echo Complete w/ Color, Spectral and Contrast if necessary per protocol; Future    1.  She is not spitting back surgery and she is now getting more short of breath which could potentially be angina equivalent some going to go ahead and arrange for stress testing also get an echocardiogram to assess cardiac function wall motion and valve morphology  2.  Regarding her carotids no carotid bruit her last carotid ultrasound in  showed plaque with 60 to 49%  3.  I reviewed the labs with elevated triglycerides and low HDL otherwise good LDL CMP is unremarkable we talked about cutting down carbohydrates  4.  No recent palpitations continue current management including aspirin flecainide  5.  She still not smoking    Return After stess test.      Advance Care Planning   ACP discussion was held with the patient during this visit. Patient has an advance directive (not in EMR), copy requested.             MEDS ORDERED DURING VISIT:  New Medications Ordered This Visit   Medications    atenolol (TENORMIN) 25 MG tablet     Sig: Take 0.5 tablets by mouth Daily.     Dispense:  45 tablet     Refill:  3    atorvastatin (LIPITOR) 40 MG tablet     Sig: Take 1 tablet by mouth Daily.     Dispense:  90 tablet     Refill:  3    flecainide (TAMBOCOR) 100 MG tablet     Sig: Take 1 tablet by mouth 2 (Two) Times a Day.     Dispense:  180 tablet     Refill:  3    nitroglycerin (NITROSTAT) 0.4 MG SL tablet     Si under the tongue as needed for angina, may repeat q5mins for up three doses     Dispense:  30 tablet     Refill:  3       As always, Sonali Maldonado APRN  I appreciate very much the opportunity to participate in the cardiovascular care of your patients. Please do not hesitate to call me with any questions with regards to Justina Flaquita Jing evaluation and management.         This document has been electronically signed by Andrew Milner MD  2024 11:22  EDT    This note is dictated utilizing voice recognition software.

## 2024-10-14 ENCOUNTER — TELEPHONE (OUTPATIENT)
Dept: NEUROSURGERY | Facility: CLINIC | Age: 69
End: 2024-10-14
Payer: MEDICARE

## 2024-10-14 NOTE — TELEPHONE ENCOUNTER
PROVIDER: DR. HENRIQUEZ    REASON FOR CALL:    PATIENT FABIENNE OHARA CALLED TO SCHEDULE A VISIT W. DR. HENRIQUEZ TO DISCUSS POSSIBLE SX. SHE WANTED ME TO GO AHEAD AND SCHEDULE HER IN [facility], BUT WANTING TO KNOW IF SHE COULD GET INTO THE [place] OFFICE SOONER OR AROUND THE SAME TIME AS HER CURRENT SCHEDULED VISIT, 10/22/24. PLEASE CALL HER BACK AND LET HER KNOW IF SHE CAN BE SCHEDULED IN [place] CLOSE TO HER CURRENT APPOINTMENT, OTHERWISE SHE WOULD LIKE TO KEEP THE APPOINTMENT SHE ALREADY HAS IN [facility]. THANKS.    CALL BACK # [phone]     CALL BACK ANYTIME.

## 2024-10-16 NOTE — TELEPHONE ENCOUNTER
No availability in [place] prior to her current appointment. However, per Dr. Bedoya, unless patient has new and/or increased symptoms or questions about surgery previously discussed, patient will need to be scheduled to see PA-C on a day he's in the office.     Spoke to patient. She stated she did not have any new/increased symptoms. Advised next [place] clinic is at the end of the month. Appointment was moved to a PA-C schedule.

## 2024-10-23 ENCOUNTER — PREP FOR SURGERY (OUTPATIENT)
Dept: OTHER | Facility: HOSPITAL | Age: 69
End: 2024-10-23
Payer: MEDICARE

## 2024-10-23 ENCOUNTER — OFFICE VISIT (OUTPATIENT)
Dept: NEUROSURGERY | Facility: CLINIC | Age: 69
End: 2024-10-23
Payer: MEDICARE

## 2024-10-23 VITALS
HEIGHT: 65 IN | DIASTOLIC BLOOD PRESSURE: 84 MMHG | WEIGHT: 191 LBS | TEMPERATURE: 97.1 F | SYSTOLIC BLOOD PRESSURE: 140 MMHG | BODY MASS INDEX: 31.82 KG/M2

## 2024-10-23 DIAGNOSIS — M51.362 DEGENERATION OF INTERVERTEBRAL DISC OF LUMBAR REGION WITH DISCOGENIC BACK PAIN AND LOWER EXTREMITY PAIN: ICD-10-CM

## 2024-10-23 DIAGNOSIS — R29.898 WEAKNESS OF BOTH LEGS: ICD-10-CM

## 2024-10-23 DIAGNOSIS — M48.062 SPINAL STENOSIS, LUMBAR REGION, WITH NEUROGENIC CLAUDICATION: Primary | ICD-10-CM

## 2024-10-23 DIAGNOSIS — M48.062 SPINAL STENOSIS OF LUMBAR REGION WITH NEUROGENIC CLAUDICATION: Primary | ICD-10-CM

## 2024-10-23 RX ORDER — CHLORHEXIDINE GLUCONATE 40 MG/ML
SOLUTION TOPICAL
Qty: 120 ML | Refills: 0 | Status: SHIPPED | OUTPATIENT
Start: 2024-10-23

## 2024-10-23 RX ORDER — FAMOTIDINE 20 MG/1
20 TABLET, FILM COATED ORAL
OUTPATIENT
Start: 2024-10-23

## 2024-10-23 NOTE — PROGRESS NOTES
"Patient: Justina Ch  : 1955  Chart #: [id number]    Date of Service: 10/23/2024    Chief Complaint: Low back and lower extremity pain     HPI  Ms. Ch (Garett) is a 69-year-old woman who has a 15-year history of episodic low back pain.  Over the last couple years her pain has increased. If she walks too long, her legs get heavy and she has diminished strength. She doesn't describe much in the way of leg pain, but rather an achiness and heaviness that settles rather globally in her thighs with walking and standing.  Ibuprofen is helpful.  She had been doing physical therapy for about 5 months and recently completed it. It was helpful with her chronic back pain, but her leg heaviness is still present.  She has had problems with plantar flexing her feet and getting up on her tiptoes since the summer of .        Review of Systems   Musculoskeletal:  Positive for back pain and myalgias.        The patient's past medical history, past surgical history, family history, and social history have been reviewed at length in the electronic medical record.     Physical examination:  Blood pressure 140/84, temperature 97.1 °F (36.2 °C), temperature source Infrared, height 165.1 cm (65\"), weight 86.6 kg (191 lb).    Constitutional: The patient is well-developed, well-nourished. She is in no acute distress.     HEENT: normocephalic, atraumatic, sclera clear    Musculoskeletal:   Motor examination does not reveal weakness in the , upper or lower extremities.   Straight leg testing is negative bilaterally.  's signs are negative.   Tenderness to palpation is elicited in   Patient has stiffened ROM in the lumbar spine.     Neurological Exam   A/A/C, speech clear, attention normal, conversant, answers questions appropriately, good historian.  Sensation is intact.  Gait is normal, balance is normal.   No tremors are noted.  Reflexes are intact, 1+.   Marti is negative. Clonus is negative. "     Radiographic Imaging:  For my review MRI of the lumbar spine dated 5/16/2024 demonstrates a transitional segment. Broad based disc bulging and facet arthropathy creates moderate neuroforaminal narrowing bilaterally at L2-3 with mild stenosis.  There is generous grade stenosis at L3-4 and L4-5.  There is rather extensive facet arthopathy throughout. Imaging was reviewed with Dr. Bedoya.     Electrodiagnostic studies performed by Dr. Reardon suggest mild chronic bilateral L5/S1 radiculopathies.    Medical Decision Making  Diagnoses and all orders for this visit:    1. Spinal stenosis of lumbar region with neurogenic claudication (Primary)    2. Weakness of both legs    3. Degeneration of intervertebral disc of lumbar region with discogenic back pain and lower extremity pain    Ms. Ch (Temere) is really struggling with her low back difficulties. I have reiterated Dr. Bedoya's previous explanation of a two-level lumbar laminectomy at L3-4 and L4-5 could be helpful with her claudication symptoms and leg weakness, but would not fix all of the chronic back pain with which she suffers. Other risks of the procedure include infection, CSF leak, and even death. 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 All of her questions were answered. Formal cardiac clearance will be necessary before surgery proceeds. I have reviewed the patient's case with Dr. Bedoya, and he feels that the current MRI imaging is adequate for surgery in January.     Any copied data from previous notes included in the (1) HPI, (2) PE, (3) MDM and/or assessment and plan has been reviewed and is accurate as of this day.    Yamile Weeks PA-C     Patient Care Team:  Sonali Maldonado APRN as PCP - General (Nurse Practitioner)  Jose Cage MD as Consulting  Physician (Cardiac Electrophysiology)  Sonali Maldonado APRN as Referring Physician (Nurse Practitioner)  Sonali Maldonado APRN as Primary Care Provider (Nurse Practitioner)  Raheem Bedoya MD as Consulting Physician (Neurosurgery)

## 2024-10-23 NOTE — H&P
"Patient: Justina Ch  : 1955  Chart #: [id number]     Date of Service: 10/23/2024     Chief Complaint: Low back and lower extremity pain      HPI  Ms. Ch (Garett) is a 69-year-old woman who has a 15-year history of episodic low back pain.  Over the last couple years her pain has increased. If she walks too long, her legs get heavy and she has diminished strength. She doesn't describe much in the way of leg pain, but rather an achiness and heaviness that settles rather globally in her thighs with walking and standing.  Ibuprofen is helpful.  She had been doing physical therapy for about 5 months and recently completed it. It was helpful with her chronic back pain, but her leg heaviness is still present.  She has had problems with plantar flexing her feet and getting up on her tiptoes since the summer of .          Review of Systems   Musculoskeletal:  Positive for back pain and myalgias.         The patient's past medical history, past surgical history, family history, and social history have been reviewed at length in the electronic medical record.      Physical examination:  Blood pressure 140/84, temperature 97.1 °F (36.2 °C), temperature source Infrared, height 165.1 cm (65\"), weight 86.6 kg (191 lb).     Constitutional: The patient is well-developed, well-nourished. She is in no acute distress.      HEENT: normocephalic, atraumatic, sclera clear     Musculoskeletal:   Motor examination does not reveal weakness in the , upper or lower extremities.   Straight leg testing is negative bilaterally.  's signs are negative.   Tenderness to palpation is elicited in   Patient has stiffened ROM in the lumbar spine.      Neurological Exam   A/A/C, speech clear, attention normal, conversant, answers questions appropriately, good historian.  Sensation is intact.  Gait is normal, balance is normal.   No tremors are noted.  Reflexes are intact, 1+.   Marti is negative. Clonus is negative. "      Radiographic Imaging:  For my review MRI of the lumbar spine dated 5/16/2024 demonstrates a transitional segment. Broad based disc bulging and facet arthropathy creates moderate neuroforaminal narrowing bilaterally at L2-3 with mild stenosis.  There is generous grade stenosis at L3-4 and L4-5.  There is rather extensive facet arthopathy throughout. Imaging was reviewed with Dr. Bedoya.     Electrodiagnostic studies performed by Dr. Reardon suggest mild chronic bilateral L5/S1 radiculopathies.     Medical Decision Making  Diagnoses and all orders for this visit:     1. Spinal stenosis of lumbar region with neurogenic claudication (Primary)     2. Weakness of both legs     3. Degeneration of intervertebral disc of lumbar region with discogenic back pain and lower extremity pain     Ms. Ch (Temere) is really struggling with her low back difficulties. I have reiterated Dr. Bedoya's previous explanation of a two-level lumbar laminectomy at L3-4 and L4-5 could be helpful with her claudication symptoms and leg weakness, but would not fix all of the chronic back pain with which she suffers. Other risks of the procedure include infection, CSF leak, and even death. 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 All of her questions were answered. Formal cardiac clearance will be necessary before surgery proceeds. I have reviewed the patient's case with Dr. Bedoya, and he feels that the current MRI imaging is adequate for surgery in January.      Any copied data from previous notes included in the (1) HPI, (2) PE, (3) MDM and/or assessment and plan has been reviewed and is accurate as of this day.     Yamile Weeks PA-C      Patient Care Team:  Sonali Maldonado APRN as PCP - General (Nurse Practitioner)  Jose Cage MD as  Consulting Physician (Cardiac Electrophysiology)  Sonali Maldonado APRN as Referring Physician (Nurse Practitioner)  Sonali Maldonado APRN as Primary Care Provider (Nurse Practitioner)  Raheem Bedoya MD as Consulting Physician (Neurosurgery)

## 2024-10-30 ENCOUNTER — OFFICE VISIT (OUTPATIENT)
Dept: NEUROLOGY | Facility: CLINIC | Age: 69
End: 2024-10-30
Payer: MEDICARE

## 2024-10-30 VITALS
HEIGHT: 65 IN | BODY MASS INDEX: 31.49 KG/M2 | HEART RATE: 57 BPM | WEIGHT: 189 LBS | SYSTOLIC BLOOD PRESSURE: 110 MMHG | DIASTOLIC BLOOD PRESSURE: 70 MMHG | OXYGEN SATURATION: 96 % | TEMPERATURE: 97.7 F

## 2024-10-30 DIAGNOSIS — R20.2 NUMBNESS AND TINGLING OF BOTH LOWER EXTREMITIES: Primary | ICD-10-CM

## 2024-10-30 DIAGNOSIS — M48.07 SPINAL STENOSIS OF LUMBOSACRAL REGION: ICD-10-CM

## 2024-10-30 DIAGNOSIS — R20.0 NUMBNESS AND TINGLING OF BOTH LOWER EXTREMITIES: Primary | ICD-10-CM

## 2024-10-30 DIAGNOSIS — R29.898 WEAKNESS OF BOTH LOWER EXTREMITIES: ICD-10-CM

## 2024-10-30 NOTE — PROGRESS NOTES
Follow Up Office Visit      Patient Name: Justina Ch  : 1955   MRN: [id number]     Chief Complaint:    Chief Complaint   Patient presents with    Follow-up     Numbness Bilateral lower extremities and stenosis of spinal canal        History of Present Illness: Justina Ch is a 69 y.o. female who is here today to follow up with tingling and numbness as well as weakness in her LE's and the Rt is still worse than the left. She says she has a heavy sensation in the hamstrings in the mornings. She has sensation of crawling in her legs and pins and needles. She has remained active and taking Ibuprofen for this. She has appt with pain management in December and will discuss if other procedures like RFTC would be helpful in the future.   She has already seen Dr Lemos's office and they are to schedule lumbar surgery in January. She says her PCP wanted her to see Dr Barger for a second opinion and she saw him earlier this month.  She reports he was also in agreement that she needed surgery on her lower back.    She also says she had EMG 2024 by Dr Reardon which shows consistency with L5/S1 radiculopathies.     -  MRI L-spine 2024:  Impression:   Advanced degenerative changes resulting in significant  central canal stenosis at L4-5 and L3-4.    -MRI of the brain and MRA without contrast on 10/31/2023 shows atrophy and white matter changes without acute process  -X-ray of the L-spine on 10/25/2023 shows multilevel degenerative disc disease      Pertinent Medical History: Hypertension, dyslipidemia, history of SVT, anxiety    Subjective      Review of Systems:   Review of Systems   Musculoskeletal:  Positive for back pain.   Neurological:  Positive for numbness.       I have reviewed and the following portions of the patient's history were updated as appropriate: past family history, past medical history, past social history, past surgical history and problem list.    Medications:      Current Outpatient Medications:     albuterol sulfate  (90 Base) MCG/ACT inhaler, Inhale 2 puffs Every 4 (Four) Hours As Needed for Wheezing., Disp: 8.5 g, Rfl: 1    ammonium lactate (LAC-HYDRIN) 12 % lotion, APPLY A THIN LAYER TOPICALLY TO THE AFFECTED AREA DAILY, Disp: , Rfl:     aspirin 81 MG tablet, Take 1 tablet by mouth Daily., Disp: , Rfl:     atenolol (TENORMIN) 25 MG tablet, Take 0.5 tablets by mouth Daily., Disp: 45 tablet, Rfl: 3    atorvastatin (LIPITOR) 40 MG tablet, Take 1 tablet by mouth Daily., Disp: 90 tablet, Rfl: 3    cetirizine (zyrTEC) 10 MG tablet, TAKE 1 TABLET BY MOUTH EVERY DAY, Disp: 90 tablet, Rfl: 3    Chlorhexidine Gluconate 4 % solution, Shower each day with solution for 5 days beginning 5 days before surgery., Disp: 120 mL, Rfl: 0    flecainide (TAMBOCOR) 100 MG tablet, Take 1 tablet by mouth 2 (Two) Times a Day., Disp: 180 tablet, Rfl: 3    Fluticasone Furoate-Vilanterol (Breo Ellipta) 200-25 MCG/ACT inhaler, Inhale 1 puff Daily. (Patient taking differently: Inhale 1 puff As Needed.), Disp: 60 each, Rfl: 5    gabapentin (Neurontin) 100 MG capsule, Take 1 capsule by mouth 3 (Three) Times a Day., Disp: 90 capsule, Rfl: 2    ipratropium (ATROVENT) 0.03 % nasal spray, ADMINSTER 2 SPRAYS INTO THE BOTH NOSTRILS EVERY 12 HOURS, Disp: 30 mL, Rfl: 1    levothyroxine (SYNTHROID, LEVOTHROID) 150 MCG tablet, Take 1 tablet by mouth Every Morning., Disp: 90 tablet, Rfl: 3    meloxicam (MOBIC) 15 MG tablet, Take 1 tablet by mouth Daily., Disp: , Rfl:     mupirocin (BACTROBAN) 2 % nasal ointment, Apply to the inside of each nostril with a cotton swab two times daily, morning and evening, for 5 days before surgery., Disp: 10 each, Rfl: 0    nitroglycerin (NITROSTAT) 0.4 MG SL tablet, 1 under the tongue as needed for angina, may repeat q5mins for up three doses, Disp: 30 tablet, Rfl: 3    Tea Tree Oil (NO FUNGUS NAIL PROTECTANT EX), Mix according to instructions and apply to affected nail  "beds once daily., Disp: , Rfl:     triamcinolone (KENALOG) 0.1 % cream, prn, Disp: , Rfl:     urea (CARMOL) 40 % cream, prn, Disp: , Rfl:     Allergies:   Allergies   Allergen Reactions    Latex Rash       Objective     Physical Exam:  Vital Signs:   Vitals:    10/30/24 0917   BP: 110/70   Pulse: 57   Temp: 97.7 °F (36.5 °C)   SpO2: 96%   Weight: 85.7 kg (189 lb)   Height: 165.1 cm (65\")   PainSc:   7   PainLoc: Leg     Body mass index is 31.45 kg/m².    Physical Exam  Constitutional:       Appearance: Normal appearance.   HENT:      Head: Normocephalic.   Eyes:      Conjunctiva/sclera: Conjunctivae normal.   Pulmonary:      Effort: Pulmonary effort is normal.   Musculoskeletal:         General: Normal range of motion.   Skin:     General: Skin is warm and dry.   Neurological:      General: No focal deficit present.      Mental Status: She is oriented to person, place, and time.      Cranial Nerves: Cranial nerves 2-12 are intact. No dysarthria.      Motor: Motor function is intact.      Coordination: Coordination is intact.   Psychiatric:         Attention and Perception: Attention normal.         Mood and Affect: Mood and affect normal.         Speech: Speech normal.         Behavior: Behavior normal. Behavior is cooperative.         Thought Content: Thought content normal.         Cognition and Memory: Cognition normal.         Judgment: Judgment normal.         Neurological Exam  Mental Status  Awake, alert and oriented to person, place and time. Oriented to person, place, and time. Recent and remote memory are intact. Speech is normal. no dysarthria present. Language is fluent with no aphasia. Attention and concentration are normal.    Motor  Normal muscle bulk throughout. Normal muscle tone. No abnormal involuntary movements.                                               Right                     Left  Deltoid                                   5                          5   Biceps                               "     5                          5   Triceps                                  5                          5   Quadriceps                           5                          5   Hamstring                             5                          5   Gastrocnemius                     5                           5   Anterior tibialis                      5                          5   Posterior tibialis                    5                          5   Peroneal                               5                          5    Coordination    Finger-to-nose, rapid alternating movements and heel-to-shin normal bilaterally without dysmetria.    Gait  Normal casual, toe, heel and tandem gait.      Assessment / Plan      Assessment/Plan:   Diagnoses and all orders for this visit:    1. Numbness and tingling of both lower extremities (Primary)    2. Spinal stenosis of lumbosacral region    3. Weakness of both lower extremities         Patient will continue with neurosurgery as scheduled.    Patient will call in the interim if she has any questions or concerns or changes.    Follow Up:   Return if symptoms worsen or fail to improve.    JOSE Pettit, FNP-BC  [facility]

## 2024-11-19 ENCOUNTER — OFFICE VISIT (OUTPATIENT)
Dept: FAMILY MEDICINE CLINIC | Facility: CLINIC | Age: 69
End: 2024-11-19
Payer: MEDICARE

## 2024-11-19 VITALS
WEIGHT: 188 LBS | HEART RATE: 63 BPM | HEIGHT: 65 IN | SYSTOLIC BLOOD PRESSURE: 112 MMHG | BODY MASS INDEX: 31.32 KG/M2 | DIASTOLIC BLOOD PRESSURE: 80 MMHG | OXYGEN SATURATION: 96 %

## 2024-11-19 DIAGNOSIS — G89.29 CHRONIC BILATERAL LOW BACK PAIN WITHOUT SCIATICA: ICD-10-CM

## 2024-11-19 DIAGNOSIS — I47.10 SUPRAVENTRICULAR TACHYCARDIA: ICD-10-CM

## 2024-11-19 DIAGNOSIS — Z00.00 MEDICARE ANNUAL WELLNESS VISIT, SUBSEQUENT: Primary | ICD-10-CM

## 2024-11-19 DIAGNOSIS — M48.062 SPINAL STENOSIS, LUMBAR REGION, WITH NEUROGENIC CLAUDICATION: ICD-10-CM

## 2024-11-19 DIAGNOSIS — E03.9 ACQUIRED HYPOTHYROIDISM: ICD-10-CM

## 2024-11-19 DIAGNOSIS — J45.30 MILD PERSISTENT REACTIVE AIRWAY DISEASE WITHOUT COMPLICATION: ICD-10-CM

## 2024-11-19 DIAGNOSIS — E78.5 DYSLIPIDEMIA: ICD-10-CM

## 2024-11-19 DIAGNOSIS — I10 PRIMARY HYPERTENSION: ICD-10-CM

## 2024-11-19 DIAGNOSIS — R29.898 LEG WEAKNESS, BILATERAL: ICD-10-CM

## 2024-11-19 DIAGNOSIS — M54.50 CHRONIC BILATERAL LOW BACK PAIN WITHOUT SCIATICA: ICD-10-CM

## 2024-11-19 PROCEDURE — G0439 PPPS, SUBSEQ VISIT: HCPCS | Performed by: NURSE PRACTITIONER

## 2024-11-19 PROCEDURE — 1125F AMNT PAIN NOTED PAIN PRSNT: CPT | Performed by: NURSE PRACTITIONER

## 2024-11-19 PROCEDURE — 3074F SYST BP LT 130 MM HG: CPT | Performed by: NURSE PRACTITIONER

## 2024-11-19 PROCEDURE — 3079F DIAST BP 80-89 MM HG: CPT | Performed by: NURSE PRACTITIONER

## 2024-11-19 PROCEDURE — 99214 OFFICE O/P EST MOD 30 MIN: CPT | Performed by: NURSE PRACTITIONER

## 2024-11-19 NOTE — PROGRESS NOTES
Subjective   The ABCs of the Annual Wellness Visit  Medicare Wellness Visit      Justina Ch is a 69 y.o. patient who presents for a Medicare Wellness Visit.    The following portions of the patient's history were reviewed and   updated as appropriate: allergies, current medications, past family history, past medical history, past social history, past surgical history, and problem list.    Compared to one year ago, the patient's physical   health is worse.  Compared to one year ago, the patient's mental   health is better.    Recent Hospitalizations:  She was not admitted to the hospital during the last year.     Current Medical Providers:  Patient Care Team:  Sonali Maldonado APRN as PCP - General (Nurse Practitioner)  Jose Cage MD as Consulting Physician (Cardiac Electrophysiology)  Sonali Maldonado APRN as Referring Physician (Nurse Practitioner)  Sonali Maldonado APRN as Primary Care Provider (Nurse Practitioner)  Raheem Bedoya MD as Consulting Physician (Neurosurgery)    Outpatient Medications Prior to Visit   Medication Sig Dispense Refill    albuterol sulfate  (90 Base) MCG/ACT inhaler Inhale 2 puffs Every 4 (Four) Hours As Needed for Wheezing. 8.5 g 1    ammonium lactate (LAC-HYDRIN) 12 % lotion APPLY A THIN LAYER TOPICALLY TO THE AFFECTED AREA DAILY      aspirin 81 MG tablet Take 1 tablet by mouth Daily.      atenolol (TENORMIN) 25 MG tablet Take 0.5 tablets by mouth Daily. 45 tablet 3    atorvastatin (LIPITOR) 40 MG tablet Take 1 tablet by mouth Daily. 90 tablet 3    cetirizine (zyrTEC) 10 MG tablet TAKE 1 TABLET BY MOUTH EVERY DAY 90 tablet 3    Chlorhexidine Gluconate 4 % solution Shower each day with solution for 5 days beginning 5 days before surgery. 120 mL 0    flecainide (TAMBOCOR) 100 MG tablet Take 1 tablet by mouth 2 (Two) Times a Day. 180 tablet 3    Fluticasone Furoate-Vilanterol (Breo Ellipta) 200-25 MCG/ACT inhaler Inhale 1 puff Daily. (Patient taking  differently: Inhale 1 puff As Needed.) 60 each 5    gabapentin (Neurontin) 100 MG capsule Take 1 capsule by mouth 3 (Three) Times a Day. 90 capsule 2    ipratropium (ATROVENT) 0.03 % nasal spray ADMINSTER 2 SPRAYS INTO THE BOTH NOSTRILS EVERY 12 HOURS 30 mL 1    levothyroxine (SYNTHROID, LEVOTHROID) 150 MCG tablet Take 1 tablet by mouth Every Morning. 90 tablet 3    meloxicam (MOBIC) 15 MG tablet Take 1 tablet by mouth Daily.      mupirocin (BACTROBAN) 2 % nasal ointment Apply to the inside of each nostril with a cotton swab two times daily, morning and evening, for 5 days before surgery. 10 each 0    nitroglycerin (NITROSTAT) 0.4 MG SL tablet 1 under the tongue as needed for angina, may repeat q5mins for up three doses 30 tablet 3    triamcinolone (KENALOG) 0.1 % cream prn      urea (CARMOL) 40 % cream prn      Tea Tree Oil (NO FUNGUS NAIL PROTECTANT EX)        No facility-administered medications prior to visit.     No opioid medication identified on active medication list. I have reviewed chart for other potential  high risk medication/s and harmful drug interactions in the elderly.      Aspirin is on active medication list. Aspirin use is indicated based on review of current medical condition/s. Pros and cons of this therapy have been discussed today. Benefits of this medication outweigh potential harm.  Patient has been encouraged to continue taking this medication.  .      Patient Active Problem List   Diagnosis    Abnormal nuclear stress test    Anxiety    Supraventricular tachycardia    Palpitations    Acquired hypothyroidism    Cardiomegaly    Left atrial tachycardia and right atrial isthmus-dependent flutter     Dyslipidemia    Hypertension    Vitamin D deficiency    PAC (premature atrial contraction)    Seasonal allergic rhinitis    Herpes simplex    Bilateral carotid artery stenosis    PVC (premature ventricular contraction)    Peripheral edema    Diastolic dysfunction    Reactive airway disease     "History of radiofrequency ablation (RFA) procedure for cardiac arrhythmia    Cigarette nicotine dependence in remission    Positive colorectal cancer screening using Cologuard test    Chronic pain of right knee    Spinal stenosis of lumbosacral region    Anginal equivalent    Spinal stenosis, lumbar region, with neurogenic claudication     Advance Care Planning Advance Directive is not on file.  ACP discussion was held with the patient during this visit. Patient has an advance directive (not in EMR), copy requested.            Objective   Vitals:    24 1124   BP: 112/80   BP Location: Left arm   Patient Position: Sitting   Cuff Size: Large Adult   Pulse: 63   SpO2: 96%   Weight: 85.3 kg (188 lb)   Height: 165.1 cm (65\")   PainSc:   9   PainLoc: Leg       Estimated body mass index is 31.28 kg/m² as calculated from the following:    Height as of this encounter: 165.1 cm (65\").    Weight as of this encounter: 85.3 kg (188 lb).            Does the patient have evidence of cognitive impairment? No                                                                                                Health  Risk Assessment    Smoking Status:  Social History     Tobacco Use   Smoking Status Former    Current packs/day: 0.00    Average packs/day: 0.5 packs/day for 29.0 years (14.5 ttl pk-yrs)    Types: Cigarettes    Start date: 1972    Quit date: 2001    Years since quittin.6    Passive exposure: Past   Smokeless Tobacco Never     Alcohol Consumption:  Social History     Substance and Sexual Activity   Alcohol Use No       Fall Risk Screen  STEADI Fall Risk Assessment was completed, and patient is at MODERATE risk for falls. Assessment completed on:2024    Depression Screening   Little interest or pleasure in doing things? Not at all   Feeling down, depressed, or hopeless? Not at all   PHQ-2 Total Score 0      Health Habits and Functional and Cognitive Screenin/19/2024    11:26 AM   Functional & " Cognitive Status   Do you have difficulty preparing food and eating? No   Do you have difficulty bathing yourself, getting dressed or grooming yourself? No   Do you have difficulty using the toilet? No   Do you have difficulty moving around from place to place? No   Do you have trouble with steps or getting out of a bed or a chair? Yes   Current Diet Well Balanced Diet   Dental Exam Up to date   Eye Exam Up to date   Exercise (times per week) 5 times per week   Current Exercises Include Walking   Do you need help using the phone?  No   Are you deaf or do you have serious difficulty hearing?  No   Do you need help to go to places out of walking distance? No   Do you need help shopping? No   Do you need help preparing meals?  No   Do you need help with housework?  No   Do you need help with laundry? No   Do you need help taking your medications? No   Do you need help managing money? No   Do you ever drive or ride in a car without wearing a seat belt? No   Have you felt unusual stress, anger or loneliness in the last month? No   Who do you live with? Other   If you need help, do you have trouble finding someone available to you? No   Have you been bothered in the last four weeks by sexual problems? No   Do you have difficulty concentrating, remembering or making decisions? No           Age-appropriate Screening Schedule:  Refer to the list below for future screening recommendations based on patient's age, sex and/or medical conditions. Orders for these recommended tests are listed in the plan section. The patient has been provided with a written plan.    Health Maintenance List  Health Maintenance   Topic Date Due    INFLUENZA VACCINE  03/31/2025 (Originally 7/1/2024)    Pneumococcal Vaccine 65+ (1 of 2 - PCV) 11/19/2025 (Originally 3/21/1961)    BMI FOLLOWUP  03/15/2025    DXA SCAN  08/08/2025    LIPID PANEL  08/14/2025    ANNUAL WELLNESS VISIT  11/19/2025    MAMMOGRAM  01/09/2026    TDAP/TD VACCINES (2 - Td or Tdap)  "02/17/2027    COLORECTAL CANCER SCREENING  10/10/2033    HEPATITIS C SCREENING  Completed    COVID-19 Vaccine  Discontinued    PAP SMEAR  Discontinued    ZOSTER VACCINE  Discontinued                                                                                                                                                CMS Preventative Services Quick Reference  Risk Factors Identified During Encounter  Chronic Pain: Natural history and expected course discussed. Questions answered.    The above risks/problems have been discussed with the patient.  Pertinent information has been shared with the patient in the After Visit Summary.  An After Visit Summary and PPPS were made available to the patient.    Follow Up:   Next Medicare Wellness visit to be scheduled in 1 year.         Additional E&M Note during same encounter follows:  Patient has additional, significant, and separately identifiable condition(s)/problem(s) that require work above and beyond the Medicare Wellness Visit     Chief Complaint  Medicare Wellness-subsequent    Subjective   HPI  Justina is also being seen today for additional medical problem/s.  Hypothyroidism, on replacement   SVT/HLD/arrhythmia, follows with cards, tolerating her meds   Spinal stenosis, plans for surgery with Dr Aureliano Rizo, has tried and failed extensive PT, continues to have leg pain, weakness, aching, numnbess and tingling, did not start gabapentin  RAD, only uses prn with exacerbation                    Objective   Vital Signs:  /80 (BP Location: Left arm, Patient Position: Sitting, Cuff Size: Large Adult)   Pulse 63   Ht 165.1 cm (65\")   Wt 85.3 kg (188 lb)   SpO2 96%   BMI 31.28 kg/m²   Physical Exam  Constitutional:       Appearance: Normal appearance. She is well-developed.   HENT:      Head: Normocephalic and atraumatic.      Right Ear: Tympanic membrane, ear canal and external ear normal.      Left Ear: Tympanic membrane, ear canal and external ear " normal.      Nose: Nose normal.      Mouth/Throat:      Pharynx: Uvula midline.   Eyes:      Pupils: Pupils are equal, round, and reactive to light.   Cardiovascular:      Rate and Rhythm: Normal rate and regular rhythm.      Heart sounds: Normal heart sounds. No murmur heard.     No friction rub. No gallop.   Pulmonary:      Effort: Pulmonary effort is normal.      Breath sounds: Normal breath sounds.   Abdominal:      General: Bowel sounds are normal.      Palpations: Abdomen is soft.      Tenderness: There is no abdominal tenderness.   Musculoskeletal:      Cervical back: Neck supple.   Lymphadenopathy:      Head:      Right side of head: No submental, submandibular, tonsillar, preauricular or posterior auricular adenopathy.      Left side of head: No submental, submandibular, tonsillar, preauricular or posterior auricular adenopathy.      Cervical: No cervical adenopathy.   Skin:     General: Skin is warm and dry.   Neurological:      General: No focal deficit present.      Mental Status: She is alert and oriented to person, place, and time.      Motor: Weakness present.      Gait: Gait abnormal.   Psychiatric:         Mood and Affect: Mood normal.         Behavior: Behavior normal.                       Assessment and Plan            Medicare annual wellness visit, subsequent  -- wellness visit performed, avoid prolonged fasting periods, ensure good hydration, stay active, yearly eye exam         Supraventricular tachycardia  -- controlled, continue f/u with cards       Acquired hypothyroidism  -- continue replacement        Dyslipidemia  -- continue statin       Primary hypertension  -- controlled on current regimen         Chronic bilateral low back pain without sciatica  -- keep f/u plan with NS, continue gabapenitn       Leg weakness, bilateral         Spinal stenosis, lumbar region, with neurogenic claudication         Mild persistent reactive airway disease without complication                 Follow Up    Return in about 6 months (around 5/19/2025).  Patient was given instructions and counseling regarding her condition or for health maintenance advice. Please see specific information pulled into the AVS if appropriate.

## 2024-11-25 ENCOUNTER — HOSPITAL ENCOUNTER (OUTPATIENT)
Dept: CARDIOLOGY | Facility: HOSPITAL | Age: 69
Discharge: HOME | End: 2024-11-25
Payer: MEDICARE

## 2024-11-25 DIAGNOSIS — I47.10 SUPRAVENTRICULAR TACHYCARDIA: ICD-10-CM

## 2024-11-25 DIAGNOSIS — I49.3 PVC (PREMATURE VENTRICULAR CONTRACTION): ICD-10-CM

## 2024-11-25 DIAGNOSIS — I20.89 ANGINAL EQUIVALENT: ICD-10-CM

## 2024-11-25 PROCEDURE — 34310000005 TECHNETIUM SESTAMIBI: Performed by: SPECIALIST

## 2024-11-25 PROCEDURE — A9500 TC99M SESTAMIBI: HCPCS | Performed by: SPECIALIST

## 2024-11-25 PROCEDURE — 93306 TTE W/DOPPLER COMPLETE: CPT

## 2024-11-25 PROCEDURE — 78452 HT MUSCLE IMAGE SPECT MULT: CPT

## 2024-11-25 PROCEDURE — 93017 CV STRESS TEST TRACING ONLY: CPT

## 2024-11-25 RX ADMIN — TECHNETIUM TC 99M SESTAMIBI 1 DOSE: 1 INJECTION INTRAVENOUS at 12:27

## 2024-11-25 RX ADMIN — TECHNETIUM TC 99M SESTAMIBI 1 DOSE: 1 INJECTION INTRAVENOUS at 10:42

## 2024-11-29 LAB
AORTIC DIMENSIONLESS INDEX: 0.7 (DI)
BH CV ECHO MEAS - ACS: 1.8 CM
BH CV ECHO MEAS - AO MAX PG: 5.9 MMHG
BH CV ECHO MEAS - AO MEAN PG: 3 MMHG
BH CV ECHO MEAS - AO ROOT DIAM: 2.6 CM
BH CV ECHO MEAS - AO V2 MAX: 121 CM/SEC
BH CV ECHO MEAS - AO V2 VTI: 31.2 CM
BH CV ECHO MEAS - AVA(I,D): 2.34 CM2
BH CV ECHO MEAS - EDV(CUBED): 62.6 ML
BH CV ECHO MEAS - EDV(MOD-SP4): 83.5 ML
BH CV ECHO MEAS - EF(MOD-SP4): 58.2 %
BH CV ECHO MEAS - EF_3D-VOL: 66 %
BH CV ECHO MEAS - ESV(CUBED): 18.2 ML
BH CV ECHO MEAS - ESV(MOD-SP4): 34.9 ML
BH CV ECHO MEAS - FS: 33.8 %
BH CV ECHO MEAS - IVS/LVPW: 0.91 CM
BH CV ECHO MEAS - IVSD: 1.06 CM
BH CV ECHO MEAS - LA A2CS (ATRIAL LENGTH): 5.9 CM
BH CV ECHO MEAS - LA A4C LENGTH: 6.2 CM
BH CV ECHO MEAS - LA DIMENSION: 4.5 CM
BH CV ECHO MEAS - LAT PEAK E' VEL: 10 CM/SEC
BH CV ECHO MEAS - LV DIASTOLIC VOL/BSA (35-75): 43.3 CM2
BH CV ECHO MEAS - LV MASS(C)D: 146.8 GRAMS
BH CV ECHO MEAS - LV MAX PG: 4.2 MMHG
BH CV ECHO MEAS - LV MEAN PG: 2 MMHG
BH CV ECHO MEAS - LV SYSTOLIC VOL/BSA (12-30): 18.1 CM2
BH CV ECHO MEAS - LV V1 MAX: 102 CM/SEC
BH CV ECHO MEAS - LV V1 VTI: 23.2 CM
BH CV ECHO MEAS - LVIDD: 4 CM
BH CV ECHO MEAS - LVIDS: 2.6 CM
BH CV ECHO MEAS - LVOT AREA: 3.1 CM2
BH CV ECHO MEAS - LVOT DIAM: 2 CM
BH CV ECHO MEAS - LVPWD: 1.17 CM
BH CV ECHO MEAS - MED PEAK E' VEL: 7 CM/SEC
BH CV ECHO MEAS - MR MAX PG: 75 MMHG
BH CV ECHO MEAS - MR MAX VEL: 432.5 CM/SEC
BH CV ECHO MEAS - MR MEAN PG: 45 MMHG
BH CV ECHO MEAS - MR MEAN VEL: 310 CM/SEC
BH CV ECHO MEAS - MR VTI: 165 CM
BH CV ECHO MEAS - MV A MAX VEL: 79.3 CM/SEC
BH CV ECHO MEAS - MV DEC SLOPE: 360 CM/SEC2
BH CV ECHO MEAS - MV DEC TIME: 0.16 SEC
BH CV ECHO MEAS - MV E MAX VEL: 74.7 CM/SEC
BH CV ECHO MEAS - MV E/A: 0.94
BH CV ECHO MEAS - MV MAX PG: 2.8 MMHG
BH CV ECHO MEAS - MV MEAN PG: 1 MMHG
BH CV ECHO MEAS - MV P1/2T: 60.4 MSEC
BH CV ECHO MEAS - MV V2 VTI: 32.2 CM
BH CV ECHO MEAS - MVA(P1/2T): 3.6 CM2
BH CV ECHO MEAS - MVA(VTI): 2.26 CM2
BH CV ECHO MEAS - PA V2 MAX: 87.9 CM/SEC
BH CV ECHO MEAS - PI END-D VEL: 105 CM/SEC
BH CV ECHO MEAS - RAP SYSTOLE: 10 MMHG
BH CV ECHO MEAS - RV MAX PG: 1.25 MMHG
BH CV ECHO MEAS - RV V1 MAX: 55.9 CM/SEC
BH CV ECHO MEAS - RV V1 VTI: 16.3 CM
BH CV ECHO MEAS - RVDD: 3.6 CM
BH CV ECHO MEAS - RVSP: 38.7 MMHG
BH CV ECHO MEAS - SV(LVOT): 72.9 ML
BH CV ECHO MEAS - SV(MOD-SP4): 48.6 ML
BH CV ECHO MEAS - SVI(LVOT): 37.8 ML/M2
BH CV ECHO MEAS - SVI(MOD-SP4): 25.2 ML/M2
BH CV ECHO MEAS - TAPSE (>1.6): 2.46 CM
BH CV ECHO MEAS - TR MAX PG: 28.7 MMHG
BH CV ECHO MEAS - TR MAX VEL: 268 CM/SEC
BH CV ECHO MEASUREMENTS AVERAGE E/E' RATIO: 8.79
LEFT ATRIUM VOLUME INDEX: 22.8 ML/M2
LEFT ATRIUM VOLUME: 50 ML

## 2024-12-02 LAB
BH CV REST NUCLEAR ISOTOPE DOSE: 10 MCI
BH CV STRESS DURATION MIN STAGE 1: 3
BH CV STRESS DURATION SEC STAGE 1: 0
BH CV STRESS GRADE STAGE 1: 10
BH CV STRESS METS STAGE 1: 5
BH CV STRESS NUCLEAR ISOTOPE DOSE: 30 MCI
BH CV STRESS PROTOCOL 1: NORMAL
BH CV STRESS RECOVERY BP: NORMAL MMHG
BH CV STRESS RECOVERY HR: 67 BPM
BH CV STRESS SPEED STAGE 1: 1.7
BH CV STRESS STAGE 1: 1
LV EF NUC BP: 81 %
MAXIMAL PREDICTED HEART RATE: 153 BPM
PERCENT MAX PREDICTED HR: 79.08 %
STRESS BASELINE BP: NORMAL MMHG
STRESS BASELINE HR: 53 BPM
STRESS PERCENT HR: 93 %
STRESS POST EXERCISE DUR MIN: 3 MIN
STRESS POST EXERCISE DUR SEC: 40 SEC
STRESS POST PEAK BP: NORMAL MMHG
STRESS POST PEAK HR: 121 BPM
STRESS TARGET HR: 130 BPM

## 2024-12-03 ENCOUNTER — TELEPHONE (OUTPATIENT)
Dept: CARDIOLOGY | Facility: CLINIC | Age: 69
End: 2024-12-03
Payer: MEDICARE

## 2024-12-03 NOTE — TELEPHONE ENCOUNTER
Received cardiac clearance request from DR OJHNNIE HENRIQUEZ stating pt has LUMBAR LAMINECTOMY and is requiring a cardiac clearance. Placed cardiac clearance request in PAT'S inbox to review and address with provider.

## 2024-12-09 PROBLEM — M48.062 SPINAL STENOSIS, LUMBAR REGION, WITH NEUROGENIC CLAUDICATION: Status: ACTIVE | Noted: 2024-10-23

## 2024-12-09 NOTE — TELEPHONE ENCOUNTER
PER DR. ALEJANDRO, MILD PRE-OPERATIVE CARDIAC RISK AND CAN HOLD ASA 5-7 DAYS PRIOR. CARDIAC CLEAR. FAXED. PH,LPN

## 2024-12-13 ENCOUNTER — OFFICE VISIT (OUTPATIENT)
Dept: CARDIOLOGY | Facility: CLINIC | Age: 69
End: 2024-12-13
Payer: MEDICARE

## 2024-12-13 VITALS
SYSTOLIC BLOOD PRESSURE: 120 MMHG | HEART RATE: 52 BPM | HEIGHT: 65 IN | DIASTOLIC BLOOD PRESSURE: 80 MMHG | WEIGHT: 188.8 LBS | BODY MASS INDEX: 31.46 KG/M2 | OXYGEN SATURATION: 95 %

## 2024-12-13 DIAGNOSIS — R94.39 ABNORMAL NUCLEAR STRESS TEST: ICD-10-CM

## 2024-12-13 DIAGNOSIS — I10 PRIMARY HYPERTENSION: Primary | ICD-10-CM

## 2024-12-13 DIAGNOSIS — E78.5 DYSLIPIDEMIA: ICD-10-CM

## 2024-12-13 DIAGNOSIS — I47.10 SUPRAVENTRICULAR TACHYCARDIA: ICD-10-CM

## 2024-12-13 DIAGNOSIS — F17.211 CIGARETTE NICOTINE DEPENDENCE IN REMISSION: ICD-10-CM

## 2024-12-13 DIAGNOSIS — Z98.890 HISTORY OF RADIOFREQUENCY ABLATION (RFA) PROCEDURE FOR CARDIAC ARRHYTHMIA: ICD-10-CM

## 2024-12-13 PROCEDURE — 99214 OFFICE O/P EST MOD 30 MIN: CPT | Performed by: SPECIALIST

## 2024-12-13 PROCEDURE — 3074F SYST BP LT 130 MM HG: CPT | Performed by: SPECIALIST

## 2024-12-13 PROCEDURE — 3079F DIAST BP 80-89 MM HG: CPT | Performed by: SPECIALIST

## 2024-12-13 RX ORDER — METOPROLOL TARTRATE 100 MG/1
TABLET ORAL
Qty: 2 TABLET | Refills: 0 | Status: SHIPPED | OUTPATIENT
Start: 2024-12-13 | End: 2024-12-13

## 2024-12-13 RX ORDER — METOPROLOL TARTRATE 100 MG/1
TABLET ORAL
Qty: 2 TABLET | Refills: 0 | Status: SHIPPED | OUTPATIENT
Start: 2024-12-13

## 2024-12-13 NOTE — PROGRESS NOTES
Subjective   Follow up, SVT, HTN, stress test result  Justina Ch is a 69 y.o. female who presents to day for Follow-up (Cardiac management - 3 month visit //Labs-8/2024//Medication-no refills needed) and Rapid Heart Rate (Review echo & stress test ///Per chart review CC for DR JOHNNIE HENRIQUEZ stating pt has LUMBAR LAMINECTOMY  has been approved an already sent on 12/9/2024).    CHIEF COMPLIANT  Chief Complaint   Patient presents with    Follow-up     Cardiac management - 3 month visit     Labs-8/2024    Medication-no refills needed    Rapid Heart Rate     Review echo & stress test       Per chart review CC for DR JOHNNIE HENRIQUEZ stating pt has LUMBAR LAMINECTOMY  has been approved an already sent on 12/9/2024       Active Problems:  Supraventricular tachycardia:   History of tachypalpitations and emergency room visits dating back to at least 2-3 years ago.   Emergency room visit in July with subsequent hospitalization for supraventricular tachycardia at [facility] for initiation of flecainide.   Event recorder, July 2014, demonstrating runs of narrow QRS tachycardia, as fast as 190-200 beats per minute.   Echocardiogram, 07/21/2014: Normal left ventricular size and function with ejection fraction of 55%. No significant valvular insufficiency.   [facility], database normal, per patient, database incomplete.  Status post EP study and RFA of left atrial tachycardia and right atrial isthmus-dependent flutter on 10/04/2014.  Recurrent tachypalpitations with event monitor, 01/23/2015 thru 02/21/2015, showing sinus rhythm with occasional PAC and sinus arrhythmia and no SVT or atrial fibrillation.   Initiation of Tambocor therapy for suppression of PACs, 02/27/2015.   Echo 7/12/17 EF 65%, trace TR  Nuclear stress test 7/12/17 EF 75%, no ischemia  Echocardiogram 10/2/18: EF 60%, no significant valvular disease  Event monitor 11/19/18: Predominately NSR, <1% PAC's/PVC's  Grave’s disease,  status post ablation with hypothyroidism.   Dyslipidemia.   Hypertension.   Skin cancer.   Surgical history:  D and C.   Skin excision     HPI  HPI    History of Present Illness  The patient presents for evaluation of chest pain, shortness of breath, and low HDL.    She reports no recent episodes of chest pain and maintains an active lifestyle, predominantly through walking. She experiences occasional palpitations, but these are infrequent and have been well-managed since the adjustment of her flecainide dosage to 100 mg twice daily. She is currently on atenolol.    She recalls a previous episode of shortness of breath while using a treadmill, but this symptom has not recurred. She also reports no peripheral edema.    She is scheduled for spinal surgery on 01/09/2025. She has been experiencing leg pain for the past 5 months, which has necessitated the use of a handicap sticker due to difficulty in walking long distances. Despite these health challenges, she remains more active than her peers. She does not smoke or consume alcohol. She monitors her blood pressure, oxygen saturation, and pulse rate daily, noting that her oxygen levels are consistently within the normal range.    SOCIAL HISTORY  She does not smoke or drink.    MEDICATIONS  atenolol, flecainide       PRIOR MEDS  Current Outpatient Medications on File Prior to Visit   Medication Sig Dispense Refill    albuterol sulfate  (90 Base) MCG/ACT inhaler Inhale 2 puffs Every 4 (Four) Hours As Needed for Wheezing. 8.5 g 1    ammonium lactate (LAC-HYDRIN) 12 % lotion APPLY A THIN LAYER TOPICALLY TO THE AFFECTED AREA DAILY      aspirin 81 MG tablet Take 1 tablet by mouth Daily.      atenolol (TENORMIN) 25 MG tablet Take 0.5 tablets by mouth Daily. 45 tablet 3    atorvastatin (LIPITOR) 40 MG tablet Take 1 tablet by mouth Daily. 90 tablet 3    cetirizine (zyrTEC) 10 MG tablet TAKE 1 TABLET BY MOUTH EVERY DAY 90 tablet 3    Chlorhexidine Gluconate 4 % solution  Shower each day with solution for 5 days beginning 5 days before surgery. 120 mL 0    flecainide (TAMBOCOR) 100 MG tablet Take 1 tablet by mouth 2 (Two) Times a Day. 180 tablet 3    Fluticasone Furoate-Vilanterol (Breo Ellipta) 200-25 MCG/ACT inhaler Inhale 1 puff Daily. (Patient taking differently: Inhale 1 puff As Needed.) 60 each 5    gabapentin (Neurontin) 100 MG capsule Take 1 capsule by mouth 3 (Three) Times a Day. 90 capsule 2    ipratropium (ATROVENT) 0.03 % nasal spray ADMINSTER 2 SPRAYS INTO THE BOTH NOSTRILS EVERY 12 HOURS 30 mL 1    levothyroxine (SYNTHROID, LEVOTHROID) 150 MCG tablet Take 1 tablet by mouth Every Morning. 90 tablet 3    meloxicam (MOBIC) 15 MG tablet Take 1 tablet by mouth Daily.      mupirocin (BACTROBAN) 2 % nasal ointment Apply to the inside of each nostril with a cotton swab two times daily, morning and evening, for 5 days before surgery. 10 each 0    nitroglycerin (NITROSTAT) 0.4 MG SL tablet 1 under the tongue as needed for angina, may repeat q5mins for up three doses 30 tablet 3    Tea Tree Oil (NO FUNGUS NAIL PROTECTANT EX)       triamcinolone (KENALOG) 0.1 % cream prn      urea (CARMOL) 40 % cream prn       No current facility-administered medications on file prior to visit.       ALLERGIES  Latex    HISTORY  Past Medical History:   Diagnosis Date    Abnormal cardiovascular stress test 05/04/2016    Allergic     Arrhythmia     Arthritis     Cancer     cervical    Closed fracture of left fibula 2020    Coronary artery disease     COVID-19 02/22/2021    Dizziness 01/04/2021    Enlarged heart     Graves disease 07/27/2016    status post ablation with hypothyroidism.      History of COVID-19 04/14/2021    Hx of mammogram 09/2016 9/16    Hx of staphylococcal infection     Hyperlipidemia     Hypertension 07/27/2016    Hyperthyroidism     Pre-syncope 05/04/2016    Reactive airway disease 11/21/2022    Shingles 2011    Snoring 05/04/2016    Supraventricular tachycardia 05/04/2016     SVT (supraventricular tachycardia)     status post ablation       Social History     Socioeconomic History    Marital status:    Tobacco Use    Smoking status: Former     Current packs/day: 0.00     Average packs/day: 0.5 packs/day for 29.0 years (14.5 ttl pk-yrs)     Types: Cigarettes     Start date: 1972     Quit date: 2001     Years since quittin.6     Passive exposure: Past    Smokeless tobacco: Never   Vaping Use    Vaping status: Never Used   Substance and Sexual Activity    Alcohol use: No    Drug use: Never    Sexual activity: Not Currently       Family History   Problem Relation Age of Onset    Hypertension Mother     Breast cancer Mother     Bone cancer Mother     Cancer Mother         Mother passed away    Dementia Mother     Arthritis Mother     Heart attack Father     Heart disease Father         pacemaker    Hyperlipidemia Father     Hypertension Father     Diabetes Father         Passed away    Breast cancer Sister     Ovarian cancer Sister     Cancer Sister         sister passed away    Diabetes Paternal Grandmother        Review of Systems   Constitutional: Negative.  Negative for chills, fatigue and fever.   HENT: Negative.  Negative for congestion, rhinorrhea and sore throat.    Eyes:  Positive for visual disturbance.   Respiratory: Negative.  Negative for chest tightness, shortness of breath and wheezing.    Cardiovascular: Negative.  Negative for chest pain, palpitations and leg swelling.   Gastrointestinal: Negative.    Endocrine: Negative.    Genitourinary: Negative.    Musculoskeletal: Negative.  Negative for arthralgias, back pain and neck pain.   Skin: Negative.    Allergic/Immunologic: Positive for environmental allergies.   Neurological:  Negative for dizziness, weakness, numbness and headaches.   Hematological:  Bruises/bleeds easily.   Psychiatric/Behavioral: Negative.  Negative for sleep disturbance.        Objective     VITALS: /80 (BP Location: Left arm,  "Patient Position: Sitting, Cuff Size: Adult)   Pulse 52   Ht 165.1 cm (65\")   Wt 85.6 kg (188 lb 12.8 oz)   LMP  (LMP Unknown)   SpO2 95%   BMI 31.42 kg/m²     LABS:   Lab Results (most recent)       None            IMAGING:   No Images in the past 120 days found..    EXAM:  Physical Exam  Vitals reviewed.   Constitutional:       Appearance: She is well-developed.   HENT:      Head: Normocephalic and atraumatic.   Eyes:      Pupils: Pupils are equal, round, and reactive to light.   Neck:      Thyroid: No thyromegaly.      Vascular: No JVD.   Cardiovascular:      Rate and Rhythm: Normal rate and regular rhythm.      Heart sounds: Normal heart sounds. No murmur heard.     No friction rub. No gallop.   Pulmonary:      Effort: Pulmonary effort is normal. No respiratory distress.      Breath sounds: Normal breath sounds. No stridor. No wheezing or rales.   Chest:      Chest wall: No tenderness.   Musculoskeletal:         General: No tenderness or deformity.      Cervical back: Neck supple.   Skin:     General: Skin is warm and dry.   Neurological:      Mental Status: She is alert and oriented to person, place, and time.      Cranial Nerves: No cranial nerve deficit.      Coordination: Coordination normal.       Physical Exam  Vital Signs  Blood pressure is normal today.       Procedure   Procedures      Results  Laboratory Studies  HDL cholesterol is 32.    Imaging  Echocardiogram showed normal heart strength, mild mitral valve leakage, and mild tricuspid valve leakage.    Testing  Stress test did not show any blockage, but had concerning parameter of transient ischemic dilatation (TID).       Assessment & Plan     Diagnoses and all orders for this visit:    1. Primary hypertension (Primary)  -     Basic Metabolic Panel; Future    2. Supraventricular tachycardia    3. Dyslipidemia    4. History of radiofrequency ablation (RFA) procedure for cardiac arrhythmia    5. Cigarette nicotine dependence in remission    6. " Abnormal nuclear stress test  -     CT Angiogram Coronary; Future  -     Basic Metabolic Panel; Future  -     No Caffeine or Nicotine 4 Hours Prior to Coronary CTA Appointment  -     Nothing to Eat or Drink 4 Hours Prior to Coronary CTA Appointment  -     Do Not Take Phosphodiasterase Inhibitors in the 72 Hours Prior to Coronary CTA      Assessment & Plan  1. Chest pain.  The echocardiogram results indicate normal cardiac function, albeit with a slight stiffness. The atrium is mildly enlarged, potentially due to the aforementioned stiffness and previous arrhythmia. Mild regurgitation is observed in both the mitral and tricuspid valves, which will be monitored closely. The stress test did not reveal any blockages, but the patient's performance was suboptimal, managing only 3 minutes and 40 seconds. The target heart rate was not achieved, and the blood pressure increased during the test. The EKG was inconclusive due to motion artifacts. The images did not show any blockages, but there was a concerning parameter of transient ischemic dilatation (TID). This could be attributed to either high blood pressure or blockages in all arteries. The stress test results are not definitive. A CT scan of the heart will be ordered to further investigate potential blockages. The patient is advised to maintain blood pressure control.    2. Shortness of breath.  The patient reports no shortness of breath during regular activities but experienced it while on the treadmill. This could be related to the stiffness of the heart or high blood pressure. The CT scan will help determine if there are any blockages contributing to this symptom.    3. Low HDL cholesterol.  The patient's HDL cholesterol level is low at 32. Aerobic exercise is recommended to help increase HDL levels. However, the patient has limitations due to back and leg issues.    Follow-up  The patient will follow up in 2 weeks.       Return in about 2 weeks (around  12/27/2024).      Advance Care Planning   ACP discussion was held with the patient during this visit. Patient does not have an advance directive, declines further assistance.             MEDS ORDERED DURING VISIT:  No orders of the defined types were placed in this encounter.        As always, Sonali Maldonado APRN  I appreciate very much the opportunity to participate in the cardiovascular care of your patients. Please do not hesitate to call me with any questions with regards to Justina Ch evaluation and management.     Patient or patient representative verbalized consent for the use of Ambient Listening during the visit with  Andrew Milner MD for chart documentation. 12/13/2024  09:46 EST       This document has been electronically signed by Andrew Milner MD  December 13, 2024 11:20 EST    This note is dictated utilizing voice recognition software.

## 2024-12-23 NOTE — ASSESSMENT & PLAN NOTE
-- controlled on current regimen          no lesions,  no deformities,  no traumatic injuries,  no significant scars are present,  chest wall non-tender,  no masses present, breathing is unlabored without accessory muscle use,normal breath sounds #1:

## 2025-01-07 ENCOUNTER — TELEPHONE (OUTPATIENT)
Dept: CARDIOLOGY | Facility: CLINIC | Age: 70
End: 2025-01-07
Payer: MEDICARE

## 2025-01-08 NOTE — TELEPHONE ENCOUNTER
Name: FABIENNE OHARA      Relationship: SELF      Best Callback Number: [phone] (home)       HUB PROVIDED THE RELAY MESSAGE FROM THE OFFICE      PATIENT: HAS FURTHER QUESTIONS AND WOULD LIKE A CALL BACK AT THE FOLLOWING PHONE NUMBER    ADDITIONAL INFORMATION: PT HAD LABS DONE AT PCP 2 MONTHS AGO BUT NOT SURE WHAT KIND OF LABS. SHE IS ASKING IF THIS LAB IS NEEDED STILL. PLEASE CALL HER WHEN AVAILABLE.

## 2025-01-20 DIAGNOSIS — I10 PRIMARY HYPERTENSION: ICD-10-CM

## 2025-01-20 DIAGNOSIS — R94.39 ABNORMAL NUCLEAR STRESS TEST: ICD-10-CM

## 2025-01-21 ENCOUNTER — TELEPHONE (OUTPATIENT)
Dept: CARDIOLOGY | Facility: CLINIC | Age: 70
End: 2025-01-21
Payer: MEDICARE

## 2025-01-21 NOTE — TELEPHONE ENCOUNTER
----- Message from Roseline LLAMAS sent at 1/21/2025  3:32 PM EST -----    ----- Message -----  From: Andrew Milner MD  Sent: 1/21/2025   3:02 PM EST  To: Roseline Whitten LPN    Please call patient normal result

## 2025-01-29 ENCOUNTER — OFFICE VISIT (OUTPATIENT)
Dept: CARDIOLOGY | Facility: CLINIC | Age: 70
End: 2025-01-29
Payer: MEDICARE

## 2025-01-29 VITALS
OXYGEN SATURATION: 93 % | HEIGHT: 65 IN | BODY MASS INDEX: 31.36 KG/M2 | HEART RATE: 60 BPM | DIASTOLIC BLOOD PRESSURE: 60 MMHG | SYSTOLIC BLOOD PRESSURE: 130 MMHG | WEIGHT: 188.2 LBS

## 2025-01-29 DIAGNOSIS — I47.10 SUPRAVENTRICULAR TACHYCARDIA: ICD-10-CM

## 2025-01-29 DIAGNOSIS — F17.211 CIGARETTE NICOTINE DEPENDENCE IN REMISSION: ICD-10-CM

## 2025-01-29 DIAGNOSIS — R07.89 OTHER CHEST PAIN: ICD-10-CM

## 2025-01-29 DIAGNOSIS — I47.19 ATRIAL TACHYCARDIA: ICD-10-CM

## 2025-01-29 DIAGNOSIS — R00.2 PALPITATIONS: ICD-10-CM

## 2025-01-29 DIAGNOSIS — Z98.890 HISTORY OF RADIOFREQUENCY ABLATION (RFA) PROCEDURE FOR CARDIAC ARRHYTHMIA: ICD-10-CM

## 2025-01-29 DIAGNOSIS — I49.3 PVC (PREMATURE VENTRICULAR CONTRACTION): ICD-10-CM

## 2025-01-29 DIAGNOSIS — I10 PRIMARY HYPERTENSION: Primary | ICD-10-CM

## 2025-01-29 DIAGNOSIS — I49.1 PAC (PREMATURE ATRIAL CONTRACTION): ICD-10-CM

## 2025-01-29 DIAGNOSIS — E78.5 DYSLIPIDEMIA: ICD-10-CM

## 2025-01-29 PROCEDURE — 3078F DIAST BP <80 MM HG: CPT | Performed by: SPECIALIST

## 2025-01-29 PROCEDURE — 3075F SYST BP GE 130 - 139MM HG: CPT | Performed by: SPECIALIST

## 2025-01-29 PROCEDURE — 99214 OFFICE O/P EST MOD 30 MIN: CPT | Performed by: SPECIALIST

## 2025-01-29 RX ORDER — NITROGLYCERIN 0.4 MG/1
TABLET SUBLINGUAL
Qty: 30 TABLET | Refills: 3 | Status: SHIPPED | OUTPATIENT
Start: 2025-01-29

## 2025-01-29 RX ORDER — ATORVASTATIN CALCIUM 40 MG/1
40 TABLET, FILM COATED ORAL DAILY
Qty: 90 TABLET | Refills: 3 | Status: SHIPPED | OUTPATIENT
Start: 2025-01-29

## 2025-01-29 RX ORDER — FLECAINIDE ACETATE 100 MG/1
100 TABLET ORAL 2 TIMES DAILY
Qty: 180 TABLET | Refills: 3 | Status: SHIPPED | OUTPATIENT
Start: 2025-01-29

## 2025-01-29 RX ORDER — ATENOLOL 25 MG/1
12.5 TABLET ORAL DAILY
Qty: 45 TABLET | Refills: 3 | Status: SHIPPED | OUTPATIENT
Start: 2025-01-29

## 2025-01-29 NOTE — PROGRESS NOTES
Subjective   Follow up, HTN, SVT,  CT angio of the heart result  Justina Ch is a 69 y.o. female who presents to day for Follow-up (Cta coronary fup) and Hypertension (BP not been taken regularly. States good as far as known).    CHIEF COMPLIANT  Chief Complaint   Patient presents with    Follow-up     Cta coronary fup    Hypertension     BP not been taken regularly. States good as far as known       Active Problems:  Supraventricular tachycardia:   History of tachypalpitations and emergency room visits dating back to at least 2-3 years ago.   Emergency room visit in July with subsequent hospitalization for supraventricular tachycardia at [facility] for initiation of flecainide.   Event recorder, July 2014, demonstrating runs of narrow QRS tachycardia, as fast as 190-200 beats per minute.   Echocardiogram, 07/21/2014: Normal left ventricular size and function with ejection fraction of 55%. No significant valvular insufficiency.   GXT Cardiolite, database normal, per patient, database incomplete.  Status post EP study and RFA of left atrial tachycardia and right atrial isthmus-dependent flutter on 10/04/2014.  Recurrent tachypalpitations with event monitor, 01/23/2015 thru 02/21/2015, showing sinus rhythm with occasional PAC and sinus arrhythmia and no SVT or atrial fibrillation.   Initiation of Tambocor therapy for suppression of PACs, 02/27/2015.   Echo 7/12/17 EF 65%, trace TR  Nuclear stress test 7/12/17 EF 75%, no ischemia  Echocardiogram 10/2/18: EF 60%, no significant valvular disease  Event monitor 11/19/18: Predominately NSR, <1% PAC's/PVC's  Grave’s disease, status post ablation with hypothyroidism.   Dyslipidemia.   Hypertension.   Skin cancer.   Surgical history:  D and C.   Skin excision     HPI  HPI    History of Present Illness  The patient presents for evaluation of blood pressure, cholesterol, and arrhythmia.    She reports satisfactory blood pressure readings at  home, with a recent measurement of 130. She does not monitor her blood pressure at home. She is not experiencing any chest pain or palpitations. She maintains an active lifestyle, although she experiences shortness of breath during physical activity, which has remained consistent. She has been abstinent from smoking for the past 24 years, having started the habit at the age of 17 or 18. She was not a heavy smoker.    She underwent blood work prior to her CT scan. She has no history of kidney or bladder infections. She recalls a period of increased food intake following the death of her  three years ago. She has not sought psychiatric help but underwent therapy for several months. She reports a stable mood and has no suicidal ideation.    She expresses concern about the hardening of her arteries and inquires about the possibility of undergoing back surgery.    SOCIAL HISTORY  The patient has not smoked in 24 years.    FAMILY HISTORY  The patient has sisters with kidney issues.    MEDICATIONS  atorvastatin, flecainide       PRIOR MEDS  Current Outpatient Medications on File Prior to Visit   Medication Sig Dispense Refill    albuterol sulfate  (90 Base) MCG/ACT inhaler Inhale 2 puffs Every 4 (Four) Hours As Needed for Wheezing. 8.5 g 1    ammonium lactate (LAC-HYDRIN) 12 % lotion APPLY A THIN LAYER TOPICALLY TO THE AFFECTED AREA DAILY      aspirin 81 MG tablet Take 1 tablet by mouth Daily.      cetirizine (zyrTEC) 10 MG tablet TAKE 1 TABLET BY MOUTH EVERY DAY 90 tablet 3    Fluticasone Furoate-Vilanterol (Breo Ellipta) 200-25 MCG/ACT inhaler Inhale 1 puff Daily. (Patient taking differently: Inhale 1 puff As Needed.) 60 each 5    gabapentin (Neurontin) 100 MG capsule Take 1 capsule by mouth 3 (Three) Times a Day. 90 capsule 2    ipratropium (ATROVENT) 0.03 % nasal spray ADMINSTER 2 SPRAYS INTO THE BOTH NOSTRILS EVERY 12 HOURS 30 mL 1    levothyroxine (SYNTHROID, LEVOTHROID) 150 MCG tablet Take 1 tablet by  mouth Every Morning. 90 tablet 3    meloxicam (MOBIC) 15 MG tablet Take 1 tablet by mouth Daily.      [DISCONTINUED] atenolol (TENORMIN) 25 MG tablet Take 0.5 tablets by mouth Daily. 45 tablet 3    [DISCONTINUED] atorvastatin (LIPITOR) 40 MG tablet Take 1 tablet by mouth Daily. 90 tablet 3    [DISCONTINUED] flecainide (TAMBOCOR) 100 MG tablet Take 1 tablet by mouth 2 (Two) Times a Day. 180 tablet 3    [DISCONTINUED] nitroglycerin (NITROSTAT) 0.4 MG SL tablet 1 under the tongue as needed for angina, may repeat q5mins for up three doses 30 tablet 3    [DISCONTINUED] Chlorhexidine Gluconate 4 % solution Shower each day with solution for 5 days beginning 5 days before surgery. (Patient not taking: Reported on 1/29/2025) 120 mL 0    [DISCONTINUED] metoprolol tartrate (LOPRESSOR) 100 MG tablet Take 1 tablet by mouth after completing registration , 2nd tablet is not to be taken unless advised by radiology (Patient not taking: Reported on 1/29/2025) 2 tablet 0    [DISCONTINUED] mupirocin (BACTROBAN) 2 % nasal ointment Apply to the inside of each nostril with a cotton swab two times daily, morning and evening, for 5 days before surgery. (Patient not taking: Reported on 1/29/2025) 10 each 0    [DISCONTINUED] Tea Tree Oil (NO FUNGUS NAIL PROTECTANT EX)  (Patient not taking: Reported on 1/29/2025)      [DISCONTINUED] triamcinolone (KENALOG) 0.1 % cream prn (Patient not taking: Reported on 1/29/2025)      [DISCONTINUED] urea (CARMOL) 40 % cream prn (Patient not taking: Reported on 1/29/2025)       No current facility-administered medications on file prior to visit.       ALLERGIES  Latex    HISTORY  Past Medical History:   Diagnosis Date    Abnormal cardiovascular stress test 05/04/2016    Allergic     Arrhythmia     Arthritis     Cancer     cervical    Closed fracture of left fibula 2020    Coronary artery disease     COVID-19 02/22/2021    Dizziness 01/04/2021    Enlarged heart     Graves disease 07/27/2016    status post  ablation with hypothyroidism.      History of COVID-19 2021    Hx of mammogram 2016    Hx of staphylococcal infection     Hyperlipidemia     Hypertension 2016    Hyperthyroidism     Pre-syncope 2016    Reactive airway disease 2022    Shingles 2011    Snoring 2016    Supraventricular tachycardia 2016    SVT (supraventricular tachycardia)     status post ablation       Social History     Socioeconomic History    Marital status:    Tobacco Use    Smoking status: Former     Current packs/day: 0.00     Average packs/day: 0.5 packs/day for 29.0 years (14.5 ttl pk-yrs)     Types: Cigarettes     Start date: 1972     Quit date: 2001     Years since quittin.7     Passive exposure: Past    Smokeless tobacco: Never   Vaping Use    Vaping status: Never Used   Substance and Sexual Activity    Alcohol use: No    Drug use: Never    Sexual activity: Not Currently       Family History   Problem Relation Age of Onset    Hypertension Mother     Breast cancer Mother     Bone cancer Mother     Cancer Mother         Mother passed away    Dementia Mother     Arthritis Mother     Heart attack Father     Heart disease Father         pacemaker    Hyperlipidemia Father     Hypertension Father     Diabetes Father         Passed away    Breast cancer Sister     Ovarian cancer Sister     Cancer Sister         sister passed away    Diabetes Paternal Grandmother        Review of Systems   Constitutional:  Negative for chills, fatigue and fever.   HENT:  Negative for congestion, rhinorrhea and sore throat.    Eyes: Negative.    Respiratory:  Negative for chest tightness and shortness of breath.    Cardiovascular:  Positive for palpitations. Negative for chest pain.   Gastrointestinal: Negative.    Endocrine: Negative.    Genitourinary: Negative.    Musculoskeletal:  Negative for back pain and neck pain.   Skin: Negative.    Allergic/Immunologic: Positive for environmental allergies.  "  Neurological:  Negative for dizziness, seizures, weakness, numbness and headaches.   Hematological:  Bruises/bleeds easily.   Psychiatric/Behavioral:  Negative for sleep disturbance.        Objective     VITALS: /60 (BP Location: Left arm, Patient Position: Sitting, Cuff Size: Adult)   Pulse 60   Ht 165.1 cm (65\")   Wt 85.4 kg (188 lb 3.2 oz)   LMP  (LMP Unknown)   SpO2 93%   BMI 31.32 kg/m²     LABS:   Lab Results (most recent)       None            IMAGING:   No Images in the past 120 days found..    EXAM:  Physical Exam  Vitals reviewed.   Constitutional:       Appearance: She is well-developed.   HENT:      Head: Normocephalic and atraumatic.   Eyes:      Pupils: Pupils are equal, round, and reactive to light.   Neck:      Thyroid: No thyromegaly.      Vascular: No JVD.   Cardiovascular:      Rate and Rhythm: Normal rate and regular rhythm.      Heart sounds: Normal heart sounds. No murmur heard.     No friction rub. No gallop.   Pulmonary:      Effort: Pulmonary effort is normal. No respiratory distress.      Breath sounds: Normal breath sounds. No stridor. No wheezing or rales.   Chest:      Chest wall: No tenderness.   Musculoskeletal:         General: No tenderness or deformity.      Cervical back: Neck supple.   Skin:     General: Skin is warm and dry.   Neurological:      Mental Status: She is alert and oriented to person, place, and time.      Cranial Nerves: No cranial nerve deficit.      Coordination: Coordination normal.       Physical Exam  Lungs were auscultated.       Procedure   Procedures      Results  Laboratory Studies  Kidney function and potassium levels were normal. LDL cholesterol was 35, HDL cholesterol was 32, and triglycerides were 206.    Imaging  CT scan of the heart showed hardening of the arteries but no blockages. Granuloma found in the lungs.       Assessment & Plan     Diagnoses and all orders for this visit:    1. Primary hypertension (Primary)  -     atenolol " (TENORMIN) 25 MG tablet; Take 0.5 tablets by mouth Daily.  Dispense: 45 tablet; Refill: 3    2. History of radiofrequency ablation (RFA) procedure for cardiac arrhythmia    3. Supraventricular tachycardia  -     atenolol (TENORMIN) 25 MG tablet; Take 0.5 tablets by mouth Daily.  Dispense: 45 tablet; Refill: 3    4. Cigarette nicotine dependence in remission    5. Dyslipidemia  -     atorvastatin (LIPITOR) 40 MG tablet; Take 1 tablet by mouth Daily.  Dispense: 90 tablet; Refill: 3  -     Lipid Panel; Future  -     Comprehensive Metabolic Panel; Future    6. Palpitations  -     atenolol (TENORMIN) 25 MG tablet; Take 0.5 tablets by mouth Daily.  Dispense: 45 tablet; Refill: 3    7. PAC (premature atrial contraction)  -     atenolol (TENORMIN) 25 MG tablet; Take 0.5 tablets by mouth Daily.  Dispense: 45 tablet; Refill: 3    8. PVC (premature ventricular contraction)  -     atenolol (TENORMIN) 25 MG tablet; Take 0.5 tablets by mouth Daily.  Dispense: 45 tablet; Refill: 3  -     flecainide (TAMBOCOR) 100 MG tablet; Take 1 tablet by mouth 2 (Two) Times a Day.  Dispense: 180 tablet; Refill: 3    9. Other chest pain  -     nitroglycerin (NITROSTAT) 0.4 MG SL tablet; 1 under the tongue as needed for angina, may repeat q5mins for up three doses  Dispense: 30 tablet; Refill: 3    10. Left atrial tachycardia and right atrial isthmus-dependent flutter       Assessment & Plan  1. Hypertension.  Her blood pressure is well-regulated. She will continue her current medication regimen.    2. Hypercholesterolemia.  Her cholesterol levels have slightly worsened, but they can be managed back to normal levels. She is advised to maintain a healthy diet and regular exercise routine. She will continue her current dose of atorvastatin. A repeat blood work will be conducted prior to her next visit.    3. Arrhythmia.  She will continue her current dose of flecainide 100 mg twice daily.    4. Atherosclerosis.  Her CT scan revealed no blockages,  but there is evidence of arterial hardening. She is advised to keep her cholesterol and blood pressure under control and engage in regular exercise to prevent progression. If she experiences symptoms such as chest pressure or tightness, further evaluation will be necessary.    Follow-up  The patient will follow up in 6 months.       Return in about 6 months (around 2025).      Advance Care Planning   ACP discussion was held with the patient during this visit. Patient does not have an advance directive, information provided.             MEDS ORDERED DURING VISIT:  New Medications Ordered This Visit   Medications    atenolol (TENORMIN) 25 MG tablet     Sig: Take 0.5 tablets by mouth Daily.     Dispense:  45 tablet     Refill:  3    atorvastatin (LIPITOR) 40 MG tablet     Sig: Take 1 tablet by mouth Daily.     Dispense:  90 tablet     Refill:  3    flecainide (TAMBOCOR) 100 MG tablet     Sig: Take 1 tablet by mouth 2 (Two) Times a Day.     Dispense:  180 tablet     Refill:  3    nitroglycerin (NITROSTAT) 0.4 MG SL tablet     Si under the tongue as needed for angina, may repeat q5mins for up three doses     Dispense:  30 tablet     Refill:  3         As always, Sonali Maldonado APRN  I appreciate very much the opportunity to participate in the cardiovascular care of your patients. Please do not hesitate to call me with any questions with regards to Justina Swiftmarie evaluation and management.     Patient or patient representative verbalized consent for the use of Ambient Listening during the visit with  Andrew Milner MD for chart documentation. 2025  11:38 EST       This document has been electronically signed by Andrew Milner MD  2025 12:19 EST    This note is dictated utilizing voice recognition software.

## 2025-02-24 DIAGNOSIS — J30.2 SEASONAL ALLERGIC RHINITIS: ICD-10-CM

## 2025-02-24 DIAGNOSIS — R09.82 PND (POST-NASAL DRIP): ICD-10-CM

## 2025-02-24 RX ORDER — CETIRIZINE HYDROCHLORIDE 10 MG/1
10 TABLET ORAL DAILY
Qty: 90 TABLET | Refills: 0 | Status: SHIPPED | OUTPATIENT
Start: 2025-02-24

## 2025-02-24 NOTE — TELEPHONE ENCOUNTER
Rx Refill Note  Requested Prescriptions     Pending Prescriptions Disp Refills    cetirizine (zyrTEC) 10 MG tablet 90 tablet 3     Sig: Take 1 tablet by mouth Daily.      Last office visit with prescribing clinician: 11/19/2024   Last telemedicine visit with prescribing clinician: Visit date not found   Next office visit with prescribing clinician: 5/20/2025     Refill request received via fax from pharmacy.  Guerita Wright MA  02/24/25, 14:04 EST

## 2025-05-09 ENCOUNTER — OFFICE VISIT (OUTPATIENT)
Dept: CARDIOLOGY | Facility: CLINIC | Age: 70
End: 2025-05-09
Payer: MEDICARE

## 2025-05-09 VITALS
HEIGHT: 66 IN | DIASTOLIC BLOOD PRESSURE: 84 MMHG | WEIGHT: 185 LBS | OXYGEN SATURATION: 94 % | BODY MASS INDEX: 29.73 KG/M2 | HEART RATE: 66 BPM | SYSTOLIC BLOOD PRESSURE: 130 MMHG

## 2025-05-09 DIAGNOSIS — I10 PRIMARY HYPERTENSION: Chronic | ICD-10-CM

## 2025-05-09 DIAGNOSIS — I47.10 SUPRAVENTRICULAR TACHYCARDIA: Primary | Chronic | ICD-10-CM

## 2025-05-09 DIAGNOSIS — I49.3 PVC (PREMATURE VENTRICULAR CONTRACTION): Chronic | ICD-10-CM

## 2025-05-09 PROCEDURE — 93000 ELECTROCARDIOGRAM COMPLETE: CPT | Performed by: INTERNAL MEDICINE

## 2025-05-09 PROCEDURE — 3079F DIAST BP 80-89 MM HG: CPT | Performed by: INTERNAL MEDICINE

## 2025-05-09 PROCEDURE — 99214 OFFICE O/P EST MOD 30 MIN: CPT | Performed by: INTERNAL MEDICINE

## 2025-05-09 PROCEDURE — 3075F SYST BP GE 130 - 139MM HG: CPT | Performed by: INTERNAL MEDICINE

## 2025-05-09 RX ORDER — FLECAINIDE ACETATE 100 MG/1
100 TABLET ORAL 2 TIMES DAILY
Qty: 180 TABLET | Refills: 3 | Status: SHIPPED | OUTPATIENT
Start: 2025-05-09

## 2025-05-09 NOTE — PROGRESS NOTES
Justina Sams Jing  1955  [phone]    [facility] CARDIOLOGY     JoelSonali, APRN  [address] [place] [address]    Chief Complaint   Patient presents with    Supraventricular tachycardia       Problem List:     Supraventricular tachycardia:   History of tachypalpitations and emergency room visits dating back to at least 2-3 years ago.   Emergency room visit in July with subsequent hospitalization for supraventricular tachycardia at [facility] for initiation of flecainide.   Event recorder, July 2014, demonstrating runs of narrow QRS tachycardia, as fast as 190-200 beats per minute.   Echocardiogram, 07/21/2014: Normal left ventricular size and function with ejection fraction of 55%. No significant valvular insufficiency.   GXT Cardiolite, database normal, per patient, database incomplete.  Status post EP study and RFA of left atrial tachycardia and right atrial isthmus-dependent flutter on 10/04/2014.  Recurrent tachypalpitations with event monitor, 01/23/2015 thru 02/21/2015, showing sinus rhythm with occasional PAC and sinus arrhythmia and no SVT or atrial fibrillation.   Initiation of Tambocor therapy for suppression of PACs, 02/27/2015.   Echo 7/12/17 EF 65%, trace TR  Nuclear stress test 7/12/17 EF 75%, no ischemia  Echocardiogram 10/2/18: EF 60%, no significant valvular disease  Event monitor 11/19/18: Predominately NSR, <1% PAC's/PVC's  Grave’s disease, status post ablation with hypothyroidism.   Dyslipidemia.   Hypertension.   Skin cancer.   Surgical history:  D and C.   Skin excision     History of Present Illness:    Pt presents for follow up of PACs, atrial flutter, atrial tachycardia, HTN. Since we last saw the pt, pt denies any AF/palpitations episodes, SOB, CP, LH, and dizziness, syncope.  Denies any hospitalizations, ER visits, bleeding, or TIA/CVA symptoms. Overall feels well.    UPDATE 5/2025: Doing well on flecainide 100mg po  bid. No chest pain/sob/palpitation. No syncope.     Allergies  Allergies   Allergen Reactions    Latex Rash       Current Medications    Current Outpatient Medications:     albuterol sulfate  (90 Base) MCG/ACT inhaler, Inhale 2 puffs Every 4 (Four) Hours As Needed for Wheezing., Disp: 8.5 g, Rfl: 1    ammonium lactate (LAC-HYDRIN) 12 % lotion, APPLY A THIN LAYER TOPICALLY TO THE AFFECTED AREA DAILY, Disp: , Rfl:     aspirin 81 MG tablet, Take 1 tablet by mouth Daily., Disp: , Rfl:     atenolol (TENORMIN) 25 MG tablet, Take 0.5 tablets by mouth Daily., Disp: 45 tablet, Rfl: 3    atorvastatin (LIPITOR) 40 MG tablet, Take 1 tablet by mouth Daily., Disp: 90 tablet, Rfl: 3    cetirizine (zyrTEC) 10 MG tablet, Take 1 tablet by mouth Daily., Disp: 90 tablet, Rfl: 0    flecainide (TAMBOCOR) 100 MG tablet, Take 1 tablet by mouth 2 (Two) Times a Day., Disp: 180 tablet, Rfl: 3    Fluticasone Furoate-Vilanterol (Breo Ellipta) 200-25 MCG/ACT inhaler, Inhale 1 puff Daily. (Patient taking differently: Inhale 1 puff As Needed.), Disp: 60 each, Rfl: 5    gabapentin (Neurontin) 100 MG capsule, Take 1 capsule by mouth 3 (Three) Times a Day., Disp: 90 capsule, Rfl: 2    ipratropium (ATROVENT) 0.03 % nasal spray, ADMINSTER 2 SPRAYS INTO THE BOTH NOSTRILS EVERY 12 HOURS, Disp: 30 mL, Rfl: 1    levothyroxine (SYNTHROID, LEVOTHROID) 150 MCG tablet, Take 1 tablet by mouth Every Morning., Disp: 90 tablet, Rfl: 3    meloxicam (MOBIC) 15 MG tablet, Take 1 tablet by mouth Daily., Disp: , Rfl:     nitroglycerin (NITROSTAT) 0.4 MG SL tablet, 1 under the tongue as needed for angina, may repeat q5mins for up three doses, Disp: 30 tablet, Rfl: 3      ROS:  General:  Denies fatigue, weight gain or loss  Cardiovascular:  Denies CP, PND, syncope, near syncope, edema or palpitations.  Pulmonary:  Denies KIRKPATRICK, cough, or wheezing      Vitals:    05/09/25 1331   BP: 130/84   BP Location: Right arm   Patient Position: Sitting   Pulse: 66   SpO2: 94%  "  Weight: 83.9 kg (185 lb)   Height: 167.6 cm (66\")     Body mass index is 29.86 kg/m².  PE:  General: NAD. A & O x 3   Neck: no JVD, no carotid bruits, no TM  Heart RRR, NL S1, S2, S4 present, no rubs, murmurs  Lungs: CTA, no wheezes, rhonchi, or rales  Abd: soft, non-tender, NL BS  Ext: No musculoskeletal deformities, no edema, cyanosis, or clubbing  Psych: normal mood and affect    Diagnostic Data:        ECG 12 Lead    Date/Time: 5/9/2025 1:44 PM  Performed by: Michael Hdz MD    Authorized by: Michael Hdz MD  Comparison: compared with previous ECG from 3/8/2024  Similar to previous ECG  Rhythm: sinus rhythm  Rate: normal  QRS axis: normal    Clinical impression: non-specific ECG                 1. Supraventricular tachycardia    2. PVC (premature ventricular contraction)    3. Primary hypertension          Plan:  1) PAC's: doing very well on meds  2) AT/AFL:  - S/p RFA Doing well, No clinical recurrence.    3) Anticoagulation:  - Continue ASA   4) HTN:  - Well controlled on meds: atenolol  - Wt loss, exercise, salt reduction    Follow up with me as needed  .    "

## 2025-05-22 DIAGNOSIS — R09.82 PND (POST-NASAL DRIP): ICD-10-CM

## 2025-05-22 DIAGNOSIS — J30.2 SEASONAL ALLERGIC RHINITIS: ICD-10-CM

## 2025-05-22 RX ORDER — CETIRIZINE HYDROCHLORIDE 10 MG/1
10 TABLET ORAL DAILY
Qty: 90 TABLET | Refills: 0 | Status: SHIPPED | OUTPATIENT
Start: 2025-05-22

## 2025-06-02 ENCOUNTER — OFFICE VISIT (OUTPATIENT)
Dept: FAMILY MEDICINE CLINIC | Facility: CLINIC | Age: 70
End: 2025-06-02
Payer: MEDICARE

## 2025-06-02 VITALS
WEIGHT: 188.2 LBS | DIASTOLIC BLOOD PRESSURE: 68 MMHG | HEIGHT: 66 IN | RESPIRATION RATE: 16 BRPM | OXYGEN SATURATION: 95 % | HEART RATE: 78 BPM | SYSTOLIC BLOOD PRESSURE: 110 MMHG | BODY MASS INDEX: 30.25 KG/M2

## 2025-06-02 DIAGNOSIS — R20.2 NUMBNESS AND TINGLING OF BOTH LOWER EXTREMITIES: ICD-10-CM

## 2025-06-02 DIAGNOSIS — E03.9 ACQUIRED HYPOTHYROIDISM: ICD-10-CM

## 2025-06-02 DIAGNOSIS — I47.10 SUPRAVENTRICULAR TACHYCARDIA: Primary | ICD-10-CM

## 2025-06-02 DIAGNOSIS — J45.30 MILD PERSISTENT REACTIVE AIRWAY DISEASE WITHOUT COMPLICATION: ICD-10-CM

## 2025-06-02 DIAGNOSIS — R20.2 PARESTHESIA: ICD-10-CM

## 2025-06-02 DIAGNOSIS — M54.50 CHRONIC BILATERAL LOW BACK PAIN WITHOUT SCIATICA: ICD-10-CM

## 2025-06-02 DIAGNOSIS — R20.0 NUMBNESS AND TINGLING OF BOTH LOWER EXTREMITIES: ICD-10-CM

## 2025-06-02 DIAGNOSIS — G89.29 CHRONIC BILATERAL LOW BACK PAIN WITHOUT SCIATICA: ICD-10-CM

## 2025-06-02 DIAGNOSIS — M48.00 CENTRAL STENOSIS OF SPINAL CANAL: ICD-10-CM

## 2025-06-02 DIAGNOSIS — E78.5 DYSLIPIDEMIA: ICD-10-CM

## 2025-06-02 DIAGNOSIS — I10 PRIMARY HYPERTENSION: ICD-10-CM

## 2025-06-02 PROCEDURE — 1159F MED LIST DOCD IN RCRD: CPT | Performed by: NURSE PRACTITIONER

## 2025-06-02 PROCEDURE — 3074F SYST BP LT 130 MM HG: CPT | Performed by: NURSE PRACTITIONER

## 2025-06-02 PROCEDURE — G2211 COMPLEX E/M VISIT ADD ON: HCPCS | Performed by: NURSE PRACTITIONER

## 2025-06-02 PROCEDURE — 1125F AMNT PAIN NOTED PAIN PRSNT: CPT | Performed by: NURSE PRACTITIONER

## 2025-06-02 PROCEDURE — 3078F DIAST BP <80 MM HG: CPT | Performed by: NURSE PRACTITIONER

## 2025-06-02 PROCEDURE — 1160F RVW MEDS BY RX/DR IN RCRD: CPT | Performed by: NURSE PRACTITIONER

## 2025-06-02 PROCEDURE — 99214 OFFICE O/P EST MOD 30 MIN: CPT | Performed by: NURSE PRACTITIONER

## 2025-06-02 RX ORDER — GABAPENTIN 100 MG/1
100 CAPSULE ORAL 3 TIMES DAILY
Qty: 90 CAPSULE | Refills: 2 | Status: SHIPPED | OUTPATIENT
Start: 2025-06-02

## 2025-06-02 NOTE — PROGRESS NOTES
Subjective     Chief Complaint:    Chief Complaint   Patient presents with    Hypertension       History of Present Illness:   Hypothyroidism, on replacement   SVT/HLD/arrhythmia, follows with cards, tolerating her meds   Spinal stenosis, opted to not do surgery, has tried and failed extensive PT, continues to have leg pain, weakness, aching, numnbess and tingling, cannot get on tip toes, now having some balance issues when she bends or walks, feels like she is going sideways at times   RAD, only uses prn with exacerbation    Review of Systems  Gen- No fevers, chills  CV- No chest pain, palpitations  Resp- No cough, dyspnea  GI- No N/V/D, abd pain  Neuro-No dizziness, headaches      I have reviewed and/or updated the patient's past medical, surgical, family, social history and problem list as appropriate.     Medications:    Current Outpatient Medications:     albuterol sulfate  (90 Base) MCG/ACT inhaler, Inhale 2 puffs Every 4 (Four) Hours As Needed for Wheezing., Disp: 8.5 g, Rfl: 1    ammonium lactate (LAC-HYDRIN) 12 % lotion, APPLY A THIN LAYER TOPICALLY TO THE AFFECTED AREA DAILY, Disp: , Rfl:     aspirin 81 MG tablet, Take 1 tablet by mouth Daily., Disp: , Rfl:     atenolol (TENORMIN) 25 MG tablet, Take 0.5 tablets by mouth Daily., Disp: 45 tablet, Rfl: 3    atorvastatin (LIPITOR) 40 MG tablet, Take 1 tablet by mouth Daily., Disp: 90 tablet, Rfl: 3    cetirizine (zyrTEC) 10 MG tablet, TAKE 1 TABLET BY MOUTH EVERY DAY, Disp: 90 tablet, Rfl: 0    flecainide (TAMBOCOR) 100 MG tablet, Take 1 tablet by mouth 2 (Two) Times a Day., Disp: 180 tablet, Rfl: 3    Fluticasone Furoate-Vilanterol (Breo Ellipta) 200-25 MCG/ACT inhaler, Inhale 1 puff Daily. (Patient taking differently: Inhale 1 puff As Needed.), Disp: 60 each, Rfl: 5    gabapentin (Neurontin) 100 MG capsule, Take 1 capsule by mouth 3 (Three) Times a Day., Disp: 90 capsule, Rfl: 2    ipratropium (ATROVENT) 0.03 % nasal spray, ADMINSTER 2 SPRAYS  "INTO THE BOTH NOSTRILS EVERY 12 HOURS, Disp: 30 mL, Rfl: 1    levothyroxine (SYNTHROID, LEVOTHROID) 150 MCG tablet, Take 1 tablet by mouth Every Morning., Disp: 90 tablet, Rfl: 3    meloxicam (MOBIC) 15 MG tablet, Take 1 tablet by mouth Daily., Disp: , Rfl:     nitroglycerin (NITROSTAT) 0.4 MG SL tablet, 1 under the tongue as needed for angina, may repeat q5mins for up three doses, Disp: 30 tablet, Rfl: 3    Allergies:  Allergies   Allergen Reactions    Latex Rash       Objective     Vital Signs:   Vitals:    06/02/25 0919   BP: 110/68   Pulse: 78   Resp: 16   SpO2: 95%   Weight: 85.4 kg (188 lb 3.2 oz)   Height: 167.6 cm (66\")     Body mass index is 30.38 kg/m².    Physical Exam:    Physical Exam  Vitals and nursing note reviewed.   Constitutional:       Appearance: She is well-developed.   HENT:      Head: Normocephalic and atraumatic.   Eyes:      Pupils: Pupils are equal, round, and reactive to light.   Cardiovascular:      Rate and Rhythm: Normal rate and regular rhythm.      Heart sounds: Normal heart sounds.   Pulmonary:      Effort: Pulmonary effort is normal.      Breath sounds: Normal breath sounds.   Abdominal:      General: Bowel sounds are normal. There is no distension.      Palpations: Abdomen is soft.      Tenderness: There is no abdominal tenderness.   Musculoskeletal:      Cervical back: Neck supple.   Skin:     General: Skin is warm and dry.   Neurological:      General: No focal deficit present.      Mental Status: She is alert and oriented to person, place, and time.   Psychiatric:         Mood and Affect: Mood normal.         Behavior: Behavior normal.         Assessment / Plan     Assessment/Plan:   Problem List Items Addressed This Visit       Supraventricular tachycardia - Primary    Relevant Medications    atenolol (TENORMIN) 25 MG tablet    nitroglycerin (NITROSTAT) 0.4 MG SL tablet    Acquired hypothyroidism    Relevant Medications    levothyroxine (SYNTHROID, LEVOTHROID) 150 MCG tablet "    atenolol (TENORMIN) 25 MG tablet    Dyslipidemia    Relevant Medications    atorvastatin (LIPITOR) 40 MG tablet    Hypertension    Overview   1 Stress test 7/23/14 - low risk, no ischemia   6.2 Echo 7/21/14 - EF 55-60%; DD II; trace MR, TR           Relevant Medications    atenolol (TENORMIN) 25 MG tablet    Reactive airway disease    Relevant Medications    Fluticasone Furoate-Vilanterol (Breo Ellipta) 200-25 MCG/ACT inhaler    albuterol sulfate  (90 Base) MCG/ACT inhaler     Other Visit Diagnoses         Central stenosis of spinal canal        Relevant Medications    gabapentin (Neurontin) 100 MG capsule      Numbness and tingling of both lower extremities        Relevant Medications    gabapentin (Neurontin) 100 MG capsule      Chronic bilateral low back pain without sciatica          Paresthesia              -- discussed balance issues likely due to back  -- The current medical regimen is effective;  continue present plan and medications.      Discussed plan of care in detail with pt today; pt verb understanding and agrees.    Follow up:  6 months, MWV    Electronically signed by JOSE Baez   06/02/2025 09:45 EDT      Please note that portions of this note were completed with a voice recognition program.

## 2025-07-30 ENCOUNTER — OFFICE VISIT (OUTPATIENT)
Dept: CARDIOLOGY | Facility: CLINIC | Age: 70
End: 2025-07-30
Payer: MEDICARE

## 2025-07-30 VITALS
SYSTOLIC BLOOD PRESSURE: 138 MMHG | HEIGHT: 66 IN | DIASTOLIC BLOOD PRESSURE: 66 MMHG | OXYGEN SATURATION: 96 % | BODY MASS INDEX: 30.38 KG/M2 | HEART RATE: 54 BPM

## 2025-07-30 DIAGNOSIS — I10 PRIMARY HYPERTENSION: ICD-10-CM

## 2025-07-30 DIAGNOSIS — R00.2 PALPITATIONS: ICD-10-CM

## 2025-07-30 DIAGNOSIS — Z98.890 HISTORY OF RADIOFREQUENCY ABLATION (RFA) PROCEDURE FOR CARDIAC ARRHYTHMIA: Primary | ICD-10-CM

## 2025-07-30 DIAGNOSIS — R07.89 OTHER CHEST PAIN: ICD-10-CM

## 2025-07-30 DIAGNOSIS — I47.19 ATRIAL TACHYCARDIA: ICD-10-CM

## 2025-07-30 DIAGNOSIS — E78.5 DYSLIPIDEMIA: ICD-10-CM

## 2025-07-30 DIAGNOSIS — F17.211 CIGARETTE NICOTINE DEPENDENCE IN REMISSION: ICD-10-CM

## 2025-07-30 DIAGNOSIS — I47.10 SUPRAVENTRICULAR TACHYCARDIA: ICD-10-CM

## 2025-07-30 RX ORDER — NITROGLYCERIN 0.4 MG/1
TABLET SUBLINGUAL
Qty: 30 TABLET | Refills: 3 | Status: SHIPPED | OUTPATIENT
Start: 2025-07-30

## 2025-07-30 RX ORDER — FLECAINIDE ACETATE 100 MG/1
100 TABLET ORAL 2 TIMES DAILY
Qty: 180 TABLET | Refills: 3 | Status: SHIPPED | OUTPATIENT
Start: 2025-07-30

## 2025-07-30 RX ORDER — ATENOLOL 25 MG/1
12.5 TABLET ORAL DAILY
Qty: 45 TABLET | Refills: 3 | Status: SHIPPED | OUTPATIENT
Start: 2025-07-30

## 2025-07-30 RX ORDER — GABAPENTIN 100 MG/1
100 CAPSULE ORAL 3 TIMES DAILY
Qty: 90 CAPSULE | Refills: 2 | Status: CANCELLED | OUTPATIENT
Start: 2025-07-30

## 2025-07-30 RX ORDER — CETIRIZINE HYDROCHLORIDE 10 MG/1
10 TABLET ORAL DAILY
Qty: 90 TABLET | Refills: 0 | Status: CANCELLED | OUTPATIENT
Start: 2025-07-30

## 2025-07-30 RX ORDER — ATORVASTATIN CALCIUM 40 MG/1
40 TABLET, FILM COATED ORAL DAILY
Qty: 90 TABLET | Refills: 3 | Status: SHIPPED | OUTPATIENT
Start: 2025-07-30

## 2025-07-30 NOTE — PROGRESS NOTES
Subjective   Follow up, HTN, history of RFA of atrial futter  Justina Flaquita Ch is a 70 y.o. female who presents to day for Follow-up (Cardiac Management: ), Med Refill (Cardiac Medication Refills Pending and ready for review//On Aspirin Daily 81 mg /), and Labs Only (Last Labs 1/2025).    CHIEF COMPLIANT  Chief Complaint   Patient presents with    Follow-up     Cardiac Management:     Med Refill     Cardiac Medication Refills Pending and ready for review    On Aspirin Daily 81 mg       Labs Only     Last Labs 1/2025       Active Problems: Supraventricular tachycardia:   History of tachypalpitations and emergency room visits dating back to at least 2-3 years ago.   Emergency room visit in July with subsequent hospitalization for supraventricular tachycardia at [facility] for initiation of flecainide.   Event recorder, July 2014, demonstrating runs of narrow QRS tachycardia, as fast as 190-200 beats per minute.   Echocardiogram, 07/21/2014: Normal left ventricular size and function with ejection fraction of 55%. No significant valvular insufficiency.   [facility], database normal, per patient, database incomplete.  Status post EP study and RFA of left atrial tachycardia and right atrial isthmus-dependent flutter on 10/04/2014.  Recurrent tachypalpitations with event monitor, 01/23/2015 thru 02/21/2015, showing sinus rhythm with occasional PAC and sinus arrhythmia and no SVT or atrial fibrillation.   Initiation of Tambocor therapy for suppression of PACs, 02/27/2015.   Echo 7/12/17 EF 65%, trace TR  Nuclear stress test 7/12/17 EF 75%, no ischemia  Echocardiogram 10/2/18: EF 60%, no significant valvular disease  Event monitor 11/19/18: Predominately NSR, <1% PAC's/PVC's  Grave’s disease, status post ablation with hypothyroidism.   Dyslipidemia.   Hypertension.   Skin cancer.   Surgical history:  D and C.   Skin excision     Problem List Items Addressed This Visit          Cardiac and  Vasculature    PVC (premature ventricular contraction)       ENT    Seasonal allergic rhinitis     Other Visit Diagnoses         Central stenosis of spinal canal          Numbness and tingling of both lower extremities          PND (post-nasal drip)                HPI  HPI    History of Present Illness    Patient states that she has been doing very well since she was last seen she said that had any palpitations whatsoever she is more active although shortness of breath or edema she occasionally gets chest discomfort just below the left shoulder but this happens rarely and not associated with exertion, she was seen recently by Dr. Hdz who reassured her and continued the flecainide dose which she had with she is also compliant with the aspirin    PRIOR MEDS  Current Outpatient Medications on File Prior to Visit   Medication Sig Dispense Refill    albuterol sulfate  (90 Base) MCG/ACT inhaler Inhale 2 puffs Every 4 (Four) Hours As Needed for Wheezing. 8.5 g 1    aspirin 81 MG tablet Take 1 tablet by mouth Daily.      atenolol (TENORMIN) 25 MG tablet Take 0.5 tablets by mouth Daily. 45 tablet 3    atorvastatin (LIPITOR) 40 MG tablet Take 1 tablet by mouth Daily. 90 tablet 3    cetirizine (zyrTEC) 10 MG tablet TAKE 1 TABLET BY MOUTH EVERY DAY 90 tablet 0    flecainide (TAMBOCOR) 100 MG tablet Take 1 tablet by mouth 2 (Two) Times a Day. 180 tablet 3    Fluticasone Furoate-Vilanterol (Breo Ellipta) 200-25 MCG/ACT inhaler Inhale 1 puff Daily. (Patient taking differently: Inhale 1 puff As Needed.) 60 each 5    gabapentin (Neurontin) 100 MG capsule Take 1 capsule by mouth 3 (Three) Times a Day. 90 capsule 2    levothyroxine (SYNTHROID, LEVOTHROID) 150 MCG tablet Take 1 tablet by mouth Every Morning. 90 tablet 3    meloxicam (MOBIC) 15 MG tablet Take 1 tablet by mouth Daily.      nitroglycerin (NITROSTAT) 0.4 MG SL tablet 1 under the tongue as needed for angina, may repeat q5mins for up three doses 30 tablet 3     [DISCONTINUED] ammonium lactate (LAC-HYDRIN) 12 % lotion APPLY A THIN LAYER TOPICALLY TO THE AFFECTED AREA DAILY      [DISCONTINUED] ipratropium (ATROVENT) 0.03 % nasal spray ADMINSTER 2 SPRAYS INTO THE BOTH NOSTRILS EVERY 12 HOURS 30 mL 1     No current facility-administered medications on file prior to visit.       ALLERGIES  Latex    HISTORY  Past Medical History:   Diagnosis Date    Abnormal cardiovascular stress test 2016    Allergic     Arrhythmia     Arthritis     Cancer     cervical    Closed fracture of left fibula     Coronary artery disease     COVID-19 2021    Dizziness 2021    Enlarged heart     Graves disease 2016    status post ablation with hypothyroidism.      History of COVID-19 2021    Hx of mammogram 2016    Hx of staphylococcal infection     Hyperlipidemia     Hypertension 2016    Hyperthyroidism     Pre-syncope 2016    Reactive airway disease 2022    Shingles 2011    Snoring 2016    Supraventricular tachycardia 2016    SVT (supraventricular tachycardia)     status post ablation       Social History     Socioeconomic History    Marital status:    Tobacco Use    Smoking status: Former     Current packs/day: 0.00     Average packs/day: 0.5 packs/day for 29.0 years (14.5 ttl pk-yrs)     Types: Cigarettes     Start date: 1972     Quit date: 2001     Years since quittin.2     Passive exposure: Past    Smokeless tobacco: Never   Vaping Use    Vaping status: Never Used   Substance and Sexual Activity    Alcohol use: No    Drug use: Never    Sexual activity: Defer       Family History   Problem Relation Age of Onset    Hypertension Mother     Breast cancer Mother     Bone cancer Mother     Cancer Mother         Mother passed away    Dementia Mother     Arthritis Mother     Heart attack Father     Heart disease Father         pacemaker    Hyperlipidemia Father     Hypertension Father     Diabetes Father         " Passed away    Breast cancer Sister     Ovarian cancer Sister     Cancer Sister         sister passed away    Diabetes Paternal Grandmother        Review of Systems   Constitutional:  Negative for fatigue.   Respiratory:  Positive for shortness of breath (Sometimes). Negative for apnea and chest tightness.    Cardiovascular:  Positive for palpitations (Intermittently). Negative for chest pain and leg swelling.   Gastrointestinal:  Negative for constipation, diarrhea and nausea.   Neurological:  Negative for dizziness, seizures, syncope, light-headedness, numbness and headaches.   Hematological:  Bruises/bleeds easily (Bruise easy).   Psychiatric/Behavioral:  Negative for sleep disturbance.        Objective     VITALS: Ht 167.6 cm (66\")   LMP  (LMP Unknown)   BMI 30.38 kg/m²     LABS:   Lab Results (most recent)       None            IMAGING:   No Images in the past 120 days found..    EXAM:  Physical Exam  Vitals reviewed.   Constitutional:       Appearance: She is well-developed.   HENT:      Head: Normocephalic and atraumatic.   Eyes:      Pupils: Pupils are equal, round, and reactive to light.   Neck:      Thyroid: No thyromegaly.      Vascular: No JVD.   Cardiovascular:      Rate and Rhythm: Regular rhythm. Bradycardia present.      Heart sounds: Normal heart sounds. No murmur heard.     No friction rub. No gallop.   Pulmonary:      Effort: Pulmonary effort is normal. No respiratory distress.      Breath sounds: Normal breath sounds. No stridor. No wheezing or rales.   Chest:      Chest wall: No tenderness.   Musculoskeletal:         General: No tenderness or deformity.      Cervical back: Neck supple.   Skin:     General: Skin is warm and dry.   Neurological:      Mental Status: She is alert and oriented to person, place, and time.      Cranial Nerves: No cranial nerve deficit.      Coordination: Coordination normal.       Physical Exam         Procedure     ECG 12 Lead    Date/Time: 7/30/2025 11:48 " AM  Performed by: Andrew Milner MD    Authorized by: Andrew Milner MD  Comments: EKG: Sinus bradycardia, heart rate 53/min, first degree AV block, QTc 468 ms, otherwise unremarkable EKG, compare with EKG on 5/9/2025 no significant changes            Results         Assessment & Plan     Diagnoses and all orders for this visit:    1. Central stenosis of spinal canal    2. Numbness and tingling of both lower extremities    3. PND (post-nasal drip)    4. Seasonal allergic rhinitis    5. PVC (premature ventricular contraction)    1.  Atrial flutters/fibrillation status post radiofrequency ablation:  Patient has been doing very well she has been maintaining sinus rhythm EKG today is a little bit bradycardic but with no symptoms so we will continue current management    2.  Blood pressure is well-controlled continue current management    3.  Chest pain is atypical advised her if the chest pain is any worse workup will be indicated.    4.  No labs I will try to get lipid profile prior to her next visit  Assessment & Plan         No follow-ups on file.      Advance Care Planning   ACP discussion was held with the patient during this visit. Patient has an advance directive (not in EMR), copy requested.             MEDS ORDERED DURING VISIT:  No orders of the defined types were placed in this encounter.        As always, Sonali Maldonado APRN  I appreciate very much the opportunity to participate in the cardiovascular care of your patients. Please do not hesitate to call me with any questions with regards to Justina Ch evaluation and management.     Patient or patient representative verbalized consent for the use of Ambient Listening during the visit with  Andrew Milner MD for chart documentation. 7/30/2025  11:08 EDT       This document has been electronically signed by Andrew Milner MD  July 30, 2025 11:19 EDT    This note is dictated utilizing voice recognition software.

## 2025-08-17 DIAGNOSIS — R09.82 PND (POST-NASAL DRIP): ICD-10-CM

## 2025-08-17 DIAGNOSIS — J30.2 SEASONAL ALLERGIC RHINITIS: ICD-10-CM

## 2025-08-17 DIAGNOSIS — E03.9 ACQUIRED HYPOTHYROIDISM: ICD-10-CM

## 2025-08-18 RX ORDER — LEVOTHYROXINE SODIUM 150 UG/1
150 TABLET ORAL
Qty: 90 TABLET | Refills: 3 | Status: SHIPPED | OUTPATIENT
Start: 2025-08-18

## 2025-08-18 RX ORDER — CETIRIZINE HYDROCHLORIDE 10 MG/1
10 TABLET ORAL DAILY
Qty: 90 TABLET | Refills: 0 | Status: SHIPPED | OUTPATIENT
Start: 2025-08-18

## (undated) DEVICE — SINGLE-USE POLYPECTOMY SNARE: Brand: CAPTIVATOR II

## (undated) DEVICE — VLV SXN AIR/H2O ORCAPOD3 1P/U STRL

## (undated) DEVICE — FRCP BX RADJAW4 NDL 2.8 240 STD OG

## (undated) DEVICE — HYBRID TUBING/CAP SET FOR OLYMPUS® SCOPES: Brand: ERBE

## (undated) DEVICE — Device

## (undated) DEVICE — QUICK CATCH IN-LINE SUCTION POLYP TRAP IS USED FOR SUCTION RETRIEVAL OF ENDOSCOPICALLY REMOVED POLYPS.

## (undated) DEVICE — LUBE JELLY PK/2.75GM STRL BX/144

## (undated) DEVICE — ENDOSCOPY PORT CONNECTOR FOR OLYMPUS® SCOPES: Brand: ERBE